# Patient Record
Sex: FEMALE | Race: WHITE | NOT HISPANIC OR LATINO
[De-identification: names, ages, dates, MRNs, and addresses within clinical notes are randomized per-mention and may not be internally consistent; named-entity substitution may affect disease eponyms.]

---

## 2018-08-21 ENCOUNTER — APPOINTMENT (OUTPATIENT)
Dept: CT IMAGING | Facility: CLINIC | Age: 68
End: 2018-08-21
Payer: MEDICARE

## 2018-08-21 ENCOUNTER — OUTPATIENT (OUTPATIENT)
Dept: OUTPATIENT SERVICES | Facility: HOSPITAL | Age: 68
LOS: 1 days | End: 2018-08-21

## 2018-08-21 ENCOUNTER — APPOINTMENT (OUTPATIENT)
Dept: ULTRASOUND IMAGING | Facility: CLINIC | Age: 68
End: 2018-08-21
Payer: MEDICARE

## 2018-08-21 DIAGNOSIS — I51.7 CARDIOMEGALY: ICD-10-CM

## 2018-08-21 DIAGNOSIS — Z86.711 PERSONAL HISTORY OF PULMONARY EMBOLISM: ICD-10-CM

## 2018-08-21 DIAGNOSIS — R60.0 LOCALIZED EDEMA: ICD-10-CM

## 2018-08-21 PROCEDURE — 93970 EXTREMITY STUDY: CPT | Mod: 26

## 2018-08-21 PROCEDURE — 71275 CT ANGIOGRAPHY CHEST: CPT | Mod: 26

## 2018-10-23 ENCOUNTER — APPOINTMENT (OUTPATIENT)
Dept: ULTRASOUND IMAGING | Facility: CLINIC | Age: 68
End: 2018-10-23
Payer: MEDICARE

## 2018-10-23 ENCOUNTER — OUTPATIENT (OUTPATIENT)
Dept: OUTPATIENT SERVICES | Facility: HOSPITAL | Age: 68
LOS: 1 days | End: 2018-10-23

## 2018-10-23 PROCEDURE — 93880 EXTRACRANIAL BILAT STUDY: CPT | Mod: 26

## 2018-10-23 PROCEDURE — 93970 EXTREMITY STUDY: CPT | Mod: 26

## 2018-10-23 PROCEDURE — 76700 US EXAM ABDOM COMPLETE: CPT | Mod: 26

## 2018-11-15 ENCOUNTER — APPOINTMENT (OUTPATIENT)
Dept: VASCULAR SURGERY | Facility: CLINIC | Age: 68
End: 2018-11-15
Payer: MEDICARE

## 2018-11-15 DIAGNOSIS — D68.51 ACTIVATED PROTEIN C RESISTANCE: ICD-10-CM

## 2018-11-15 DIAGNOSIS — I89.0 LYMPHEDEMA, NOT ELSEWHERE CLASSIFIED: ICD-10-CM

## 2018-11-15 DIAGNOSIS — I87.399 CHRONIC VENOUS HYPERTENSION (IDIOPATHIC) WITH OTHER COMPLICATIONS OF UNSPECIFIED LOWER EXTREMITY: ICD-10-CM

## 2018-11-15 DIAGNOSIS — E66.01 MORBID (SEVERE) OBESITY DUE TO EXCESS CALORIES: ICD-10-CM

## 2018-11-15 PROCEDURE — 99213 OFFICE O/P EST LOW 20 MIN: CPT

## 2020-10-13 ENCOUNTER — EMERGENCY (EMERGENCY)
Facility: HOSPITAL | Age: 70
LOS: 1 days | Discharge: ANOTHER HOSPITAL | End: 2020-10-13
Attending: EMERGENCY MEDICINE | Admitting: EMERGENCY MEDICINE
Payer: MEDICARE

## 2020-10-13 VITALS
SYSTOLIC BLOOD PRESSURE: 170 MMHG | TEMPERATURE: 99 F | OXYGEN SATURATION: 92 % | HEART RATE: 107 BPM | WEIGHT: 250 LBS | DIASTOLIC BLOOD PRESSURE: 110 MMHG | RESPIRATION RATE: 18 BRPM

## 2020-10-13 DIAGNOSIS — E83.42 HYPOMAGNESEMIA: ICD-10-CM

## 2020-10-13 DIAGNOSIS — R41.82 ALTERED MENTAL STATUS, UNSPECIFIED: ICD-10-CM

## 2020-10-13 DIAGNOSIS — U07.1 COVID-19: ICD-10-CM

## 2020-10-13 DIAGNOSIS — E87.6 HYPOKALEMIA: ICD-10-CM

## 2020-10-13 LAB
ALBUMIN SERPL ELPH-MCNC: 3.4 G/DL — SIGNIFICANT CHANGE UP (ref 3.4–5)
ALP SERPL-CCNC: 112 U/L — SIGNIFICANT CHANGE UP (ref 40–120)
ALT FLD-CCNC: 20 U/L — SIGNIFICANT CHANGE UP (ref 12–42)
ANION GAP SERPL CALC-SCNC: 10 MMOL/L — SIGNIFICANT CHANGE UP (ref 9–16)
ANION GAP SERPL CALC-SCNC: 10 MMOL/L — SIGNIFICANT CHANGE UP (ref 9–16)
APPEARANCE UR: CLEAR — SIGNIFICANT CHANGE UP
APTT BLD: 31.4 SEC — SIGNIFICANT CHANGE UP (ref 27.5–35.5)
AST SERPL-CCNC: 53 U/L — HIGH (ref 15–37)
BACTERIA # UR AUTO: PRESENT /HPF
BASOPHILS # BLD AUTO: 0.01 K/UL — SIGNIFICANT CHANGE UP (ref 0–0.2)
BASOPHILS NFR BLD AUTO: 0.2 % — SIGNIFICANT CHANGE UP (ref 0–2)
BILIRUB SERPL-MCNC: 0.8 MG/DL — SIGNIFICANT CHANGE UP (ref 0.2–1.2)
BILIRUB UR-MCNC: NEGATIVE — SIGNIFICANT CHANGE UP
BUN SERPL-MCNC: 13 MG/DL — SIGNIFICANT CHANGE UP (ref 7–23)
BUN SERPL-MCNC: 16 MG/DL — SIGNIFICANT CHANGE UP (ref 7–23)
CALCIUM SERPL-MCNC: 10.5 MG/DL — SIGNIFICANT CHANGE UP (ref 8.5–10.5)
CALCIUM SERPL-MCNC: 9.5 MG/DL — SIGNIFICANT CHANGE UP (ref 8.5–10.5)
CHLORIDE SERPL-SCNC: 100 MMOL/L — SIGNIFICANT CHANGE UP (ref 96–108)
CHLORIDE SERPL-SCNC: 104 MMOL/L — SIGNIFICANT CHANGE UP (ref 96–108)
CO2 SERPL-SCNC: 29 MMOL/L — SIGNIFICANT CHANGE UP (ref 22–31)
CO2 SERPL-SCNC: 29 MMOL/L — SIGNIFICANT CHANGE UP (ref 22–31)
COLOR SPEC: YELLOW — SIGNIFICANT CHANGE UP
CREAT SERPL-MCNC: 1.06 MG/DL — SIGNIFICANT CHANGE UP (ref 0.5–1.3)
CREAT SERPL-MCNC: 1.2 MG/DL — SIGNIFICANT CHANGE UP (ref 0.5–1.3)
DIFF PNL FLD: ABNORMAL
EOSINOPHIL # BLD AUTO: 0 K/UL — SIGNIFICANT CHANGE UP (ref 0–0.5)
EOSINOPHIL NFR BLD AUTO: 0 % — SIGNIFICANT CHANGE UP (ref 0–6)
EPI CELLS # UR: SIGNIFICANT CHANGE UP /HPF (ref 0–5)
GLUCOSE SERPL-MCNC: 153 MG/DL — HIGH (ref 70–99)
GLUCOSE SERPL-MCNC: 187 MG/DL — HIGH (ref 70–99)
GLUCOSE UR QL: NEGATIVE — SIGNIFICANT CHANGE UP
HCT VFR BLD CALC: 43.7 % — SIGNIFICANT CHANGE UP (ref 34.5–45)
HGB BLD-MCNC: 13.5 G/DL — SIGNIFICANT CHANGE UP (ref 11.5–15.5)
HYALINE CASTS # UR AUTO: SIGNIFICANT CHANGE UP /LPF (ref 0–2)
IMM GRANULOCYTES NFR BLD AUTO: 0.5 % — SIGNIFICANT CHANGE UP (ref 0–1.5)
INR BLD: 1.16 — SIGNIFICANT CHANGE UP (ref 0.88–1.16)
KETONES UR-MCNC: ABNORMAL MG/DL
LACTATE SERPL-SCNC: 1.7 MMOL/L — SIGNIFICANT CHANGE UP (ref 0.4–2)
LACTATE SERPL-SCNC: 2.9 MMOL/L — HIGH (ref 0.4–2)
LEUKOCYTE ESTERASE UR-ACNC: NEGATIVE — SIGNIFICANT CHANGE UP
LYMPHOCYTES # BLD AUTO: 0.75 K/UL — LOW (ref 1–3.3)
LYMPHOCYTES # BLD AUTO: 13 % — SIGNIFICANT CHANGE UP (ref 13–44)
MAGNESIUM SERPL-MCNC: 1.3 MG/DL — LOW (ref 1.6–2.6)
MAGNESIUM SERPL-MCNC: 2 MG/DL — SIGNIFICANT CHANGE UP (ref 1.6–2.6)
MCHC RBC-ENTMCNC: 29.5 PG — SIGNIFICANT CHANGE UP (ref 27–34)
MCHC RBC-ENTMCNC: 30.9 GM/DL — LOW (ref 32–36)
MCV RBC AUTO: 95.4 FL — SIGNIFICANT CHANGE UP (ref 80–100)
MONOCYTES # BLD AUTO: 0.4 K/UL — SIGNIFICANT CHANGE UP (ref 0–0.9)
MONOCYTES NFR BLD AUTO: 6.9 % — SIGNIFICANT CHANGE UP (ref 2–14)
NEUTROPHILS # BLD AUTO: 4.6 K/UL — SIGNIFICANT CHANGE UP (ref 1.8–7.4)
NEUTROPHILS NFR BLD AUTO: 79.4 % — HIGH (ref 43–77)
NITRITE UR-MCNC: NEGATIVE — SIGNIFICANT CHANGE UP
NRBC # BLD: 0 /100 WBCS — SIGNIFICANT CHANGE UP (ref 0–0)
PCO2 BLDV: 51 MMHG — SIGNIFICANT CHANGE UP (ref 41–51)
PH BLDV: 7.4 — SIGNIFICANT CHANGE UP (ref 7.32–7.43)
PH UR: 7 — SIGNIFICANT CHANGE UP (ref 5–8)
PLATELET # BLD AUTO: 130 K/UL — LOW (ref 150–400)
PO2 BLDV: 24 MMHG — LOW (ref 35–40)
POTASSIUM SERPL-MCNC: 3.2 MMOL/L — LOW (ref 3.5–5.3)
POTASSIUM SERPL-MCNC: 3.6 MMOL/L — SIGNIFICANT CHANGE UP (ref 3.5–5.3)
POTASSIUM SERPL-SCNC: 3.2 MMOL/L — LOW (ref 3.5–5.3)
POTASSIUM SERPL-SCNC: 3.6 MMOL/L — SIGNIFICANT CHANGE UP (ref 3.5–5.3)
PROT SERPL-MCNC: 7.6 G/DL — SIGNIFICANT CHANGE UP (ref 6.4–8.2)
PROT UR-MCNC: ABNORMAL MG/DL
PROTHROM AB SERPL-ACNC: 13.6 SEC — SIGNIFICANT CHANGE UP (ref 10.6–13.6)
RBC # BLD: 4.58 M/UL — SIGNIFICANT CHANGE UP (ref 3.8–5.2)
RBC # FLD: 12.6 % — SIGNIFICANT CHANGE UP (ref 10.3–14.5)
RBC CASTS # UR COMP ASSIST: < 5 /HPF — SIGNIFICANT CHANGE UP
SAO2 % BLDV: 39 % — SIGNIFICANT CHANGE UP
SARS-COV-2 RNA SPEC QL NAA+PROBE: DETECTED
SODIUM SERPL-SCNC: 139 MMOL/L — SIGNIFICANT CHANGE UP (ref 132–145)
SODIUM SERPL-SCNC: 143 MMOL/L — SIGNIFICANT CHANGE UP (ref 132–145)
SP GR SPEC: 1.01 — SIGNIFICANT CHANGE UP (ref 1–1.03)
TROPONIN I SERPL-MCNC: 0.04 NG/ML — SIGNIFICANT CHANGE UP (ref 0.02–0.06)
UROBILINOGEN FLD QL: 0.2 E.U./DL — SIGNIFICANT CHANGE UP
WBC # BLD: 5.79 K/UL — SIGNIFICANT CHANGE UP (ref 3.8–10.5)
WBC # FLD AUTO: 5.79 K/UL — SIGNIFICANT CHANGE UP (ref 3.8–10.5)
WBC UR QL: < 5 /HPF — SIGNIFICANT CHANGE UP

## 2020-10-13 PROCEDURE — 93010 ELECTROCARDIOGRAM REPORT: CPT

## 2020-10-13 PROCEDURE — 70450 CT HEAD/BRAIN W/O DYE: CPT | Mod: 26

## 2020-10-13 PROCEDURE — 71045 X-RAY EXAM CHEST 1 VIEW: CPT | Mod: 26

## 2020-10-13 PROCEDURE — 99285 EMERGENCY DEPT VISIT HI MDM: CPT | Mod: CS,25

## 2020-10-13 RX ORDER — POTASSIUM CHLORIDE 20 MEQ
40 PACKET (EA) ORAL ONCE
Refills: 0 | Status: COMPLETED | OUTPATIENT
Start: 2020-10-13 | End: 2020-10-13

## 2020-10-13 RX ORDER — MAGNESIUM SULFATE 500 MG/ML
1 VIAL (ML) INJECTION ONCE
Refills: 0 | Status: DISCONTINUED | OUTPATIENT
Start: 2020-10-13 | End: 2020-10-13

## 2020-10-13 RX ORDER — SODIUM CHLORIDE 9 MG/ML
3000 INJECTION INTRAMUSCULAR; INTRAVENOUS; SUBCUTANEOUS ONCE
Refills: 0 | Status: COMPLETED | OUTPATIENT
Start: 2020-10-13 | End: 2020-10-13

## 2020-10-13 RX ORDER — MAGNESIUM SULFATE 500 MG/ML
2 VIAL (ML) INJECTION ONCE
Refills: 0 | Status: COMPLETED | OUTPATIENT
Start: 2020-10-13 | End: 2020-10-13

## 2020-10-13 RX ORDER — CEFTRIAXONE 500 MG/1
1000 INJECTION, POWDER, FOR SOLUTION INTRAMUSCULAR; INTRAVENOUS ONCE
Refills: 0 | Status: COMPLETED | OUTPATIENT
Start: 2020-10-13 | End: 2020-10-13

## 2020-10-13 RX ORDER — KETOROLAC TROMETHAMINE 30 MG/ML
30 SYRINGE (ML) INJECTION ONCE
Refills: 0 | Status: DISCONTINUED | OUTPATIENT
Start: 2020-10-13 | End: 2020-10-13

## 2020-10-13 RX ORDER — ACETAMINOPHEN 500 MG
650 TABLET ORAL ONCE
Refills: 0 | Status: COMPLETED | OUTPATIENT
Start: 2020-10-13 | End: 2020-10-13

## 2020-10-13 RX ADMIN — CEFTRIAXONE 100 MILLIGRAM(S): 500 INJECTION, POWDER, FOR SOLUTION INTRAMUSCULAR; INTRAVENOUS at 14:50

## 2020-10-13 RX ADMIN — SODIUM CHLORIDE 3000 MILLILITER(S): 9 INJECTION INTRAMUSCULAR; INTRAVENOUS; SUBCUTANEOUS at 14:50

## 2020-10-13 RX ADMIN — Medication 30 MILLIGRAM(S): at 23:29

## 2020-10-13 RX ADMIN — Medication 30 MILLIGRAM(S): at 15:32

## 2020-10-13 RX ADMIN — Medication 40 MILLIEQUIVALENT(S): at 16:52

## 2020-10-13 RX ADMIN — Medication 50 GRAM(S): at 16:52

## 2020-10-13 RX ADMIN — Medication 40 MILLIEQUIVALENT(S): at 23:29

## 2020-10-13 NOTE — ED ADULT TRIAGE NOTE - CHIEF COMPLAINT QUOTE
Pt brought in by EMS for AMS. As per family members pt mental status has declining over the past few days. Pt lives alone and they do not believe that she has been taking her medications. Upon EMS arrival pt was unable to ambulate and incontinent. HX of ectopic fibrillation, diabetes and Factor 5.

## 2020-10-13 NOTE — ED PROVIDER NOTE - DIAGNOSTIC INTERPRETATION
Chest x-ray interpreted by BENIGNO PA: Antonette Johnston  Findings: heart size normal, no infiltrates, lungs fully expanded, soft tissues appear normal.

## 2020-10-13 NOTE — ED PROVIDER NOTE - OBJECTIVE STATEMENT
71 yo F w/ PMHx of CHF, gout, DM, cardiomyopathy, atrial fibrillation, Factor V Leiden? BIBEMS for evaluation of Altered Mental Status. Per family, pt has been confused for the past last month and has been bedbound at home. Today the pt's family found her disheveled and covered in urine; pt is c/o R shoulder pain. Pt w/ chronic R rotator cuff injury, for which she receives physical therapy; PT notes fever 103F earlier today during session - pt is afebrile on arrival. No fever currently, chills, chest pain, cough, SOB, abdominal pain, fall or trauma, urinary complaints.

## 2020-10-13 NOTE — ED PROVIDER NOTE - SKIN, MLM
generalized lymphedema, 2x2cm superficial wound to R anterior lower leg with mild erythema, not warm to touch, no discharge, no bleeding, compartments soft

## 2020-10-13 NOTE — ED PROVIDER NOTE - PROGRESS NOTE DETAILS
PMD: Dr Benita Louis ([phone] & [phone]); spoke with PMD who confirms pt's PMHx, notes she is currently getting worked up for Factor V Leiden. PMD notes pt takes carvedilol, prodaxa, digoxin, allopurinol, triceba, and lasix. Pt is allergic to ampicillin. Pt was diagnosed with UTI in August 2020, which was not treated because the pt was not symptomatic. called [facility] transfer center through [facility] regarding admission. States will get hospitalist and call back. called transfer center again. will get hospitalist and call back. case discussed with Dr. Hernández who accepts pt. requests repeat lytes and trop. updated family and healthcare proxy: Maria D Miranda [phone].

## 2020-10-13 NOTE — ED PROVIDER NOTE - CARE PLAN
Principal Discharge DX:	COVID-19  Secondary Diagnosis:	Hypokalemia  Secondary Diagnosis:	Hypomagnesemia  Secondary Diagnosis:	Debility

## 2020-10-13 NOTE — ED ADULT NURSE NOTE - OBJECTIVE STATEMENT
Pt BIBA from home with family c/o AMS. As per family members pt has been declining over the past few days, has become more lethargic with difficulty ambulating and episodes of incontinence. Family also believes she hasn't been taking her daily medications. Pt is A&Ox3. When asked why pt is here, pt stated "I think I have an infection". Pt does not answer any other questions. Denies any pain.

## 2020-10-13 NOTE — ED PROVIDER NOTE - CHPI ED SYMPTOMS NEG
no fever/no chills/no decreased eating/drinking/no vomiting/no numbness/no weakness/no nausea/no tingling

## 2020-10-13 NOTE — ED PROVIDER NOTE - CLINICAL SUMMARY MEDICAL DECISION MAKING FREE TEXT BOX
pt presents today with family with concern for AMS with slight confusion, weakness, debility, urinary incontinence, fever, and chills. labs significant for hypomagnesemia, hypokalemia, both of which were repleted. also with elevated lactate which was treated with 1.5L NS but given h/o lymphedema, CHF, and concern for COVID did not give weight based fluids. lactate improved. also noted to be covid positive. pt and family do not feel that she is safe to be at home by herself as she is usually ambulating but given the weakness, she is hardly able to walk. pt followed by Dr. Benita Louis [phone] and several other doctors at [facility] and requests transfer. pt presents today with family with concern for AMS with slight confusion, weakness, debility, urinary incontinence, fever, and chills. labs significant for hypomagnesemia, hypokalemia, both of which were repleted. also with elevated lactate which was treated with 1.5L NS but given h/o lymphedema, CHF, and concern for COVID did not give weight based fluids. lactate improved. also noted to be covid positive. pt and family do not feel that she is safe to be at home by herself as she is usually ambulating but given the weakness, she is hardly able to walk. pt followed by Dr. Benita Louis [phone] and several other doctors at [facility] and requests transfer. Updated [facility] - repeat potassium and magnesium within normal. Patient currently waiting for bed on regular medical floor.

## 2020-10-14 VITALS
OXYGEN SATURATION: 100 % | DIASTOLIC BLOOD PRESSURE: 88 MMHG | RESPIRATION RATE: 18 BRPM | HEART RATE: 60 BPM | SYSTOLIC BLOOD PRESSURE: 141 MMHG | TEMPERATURE: 97 F

## 2020-10-14 LAB — B-OH-BUTYR SERPL-SCNC: 0.2 MMOL/L — SIGNIFICANT CHANGE UP

## 2020-10-15 ENCOUNTER — TRANSCRIPTION ENCOUNTER (OUTPATIENT)
Age: 70
End: 2020-10-15

## 2020-10-15 LAB
CULTURE RESULTS: NO GROWTH — SIGNIFICANT CHANGE UP
SPECIMEN SOURCE: SIGNIFICANT CHANGE UP

## 2020-10-19 LAB
CULTURE RESULTS: SIGNIFICANT CHANGE UP
CULTURE RESULTS: SIGNIFICANT CHANGE UP
SPECIMEN SOURCE: SIGNIFICANT CHANGE UP
SPECIMEN SOURCE: SIGNIFICANT CHANGE UP

## 2023-01-28 ENCOUNTER — EMERGENCY (EMERGENCY)
Facility: HOSPITAL | Age: 73
LOS: 1 days | Discharge: HOME | End: 2023-01-28
Attending: EMERGENCY MEDICINE | Admitting: EMERGENCY MEDICINE
Payer: MEDICARE

## 2023-01-28 VITALS
OXYGEN SATURATION: 97 % | SYSTOLIC BLOOD PRESSURE: 136 MMHG | HEART RATE: 58 BPM | TEMPERATURE: 97 F | DIASTOLIC BLOOD PRESSURE: 94 MMHG | RESPIRATION RATE: 18 BRPM

## 2023-01-28 VITALS
DIASTOLIC BLOOD PRESSURE: 80 MMHG | HEART RATE: 76 BPM | TEMPERATURE: 98 F | WEIGHT: 293 LBS | OXYGEN SATURATION: 99 % | SYSTOLIC BLOOD PRESSURE: 129 MMHG | RESPIRATION RATE: 18 BRPM

## 2023-01-28 LAB
ALBUMIN SERPL ELPH-MCNC: 3.3 G/DL — LOW (ref 3.4–5)
ALP SERPL-CCNC: 102 U/L — SIGNIFICANT CHANGE UP (ref 40–120)
ALT FLD-CCNC: 24 U/L — SIGNIFICANT CHANGE UP (ref 12–42)
ANION GAP SERPL CALC-SCNC: 5 MMOL/L — LOW (ref 9–16)
APPEARANCE UR: CLEAR — SIGNIFICANT CHANGE UP
AST SERPL-CCNC: 43 U/L — HIGH (ref 15–37)
BACTERIA # UR AUTO: PRESENT /HPF
BASOPHILS # BLD AUTO: 0.08 K/UL — SIGNIFICANT CHANGE UP (ref 0–0.2)
BASOPHILS NFR BLD AUTO: 0.8 % — SIGNIFICANT CHANGE UP (ref 0–2)
BILIRUB SERPL-MCNC: 0.5 MG/DL — SIGNIFICANT CHANGE UP (ref 0.2–1.2)
BILIRUB UR-MCNC: NEGATIVE — SIGNIFICANT CHANGE UP
BUN SERPL-MCNC: 9 MG/DL — SIGNIFICANT CHANGE UP (ref 7–23)
CALCIUM SERPL-MCNC: 10.8 MG/DL — HIGH (ref 8.5–10.5)
CHLORIDE SERPL-SCNC: 110 MMOL/L — HIGH (ref 96–108)
CO2 SERPL-SCNC: 30 MMOL/L — SIGNIFICANT CHANGE UP (ref 22–31)
COLOR SPEC: YELLOW — SIGNIFICANT CHANGE UP
CREAT SERPL-MCNC: 1 MG/DL — SIGNIFICANT CHANGE UP (ref 0.5–1.3)
DIFF PNL FLD: ABNORMAL
EGFR: 59 ML/MIN/1.73M2 — LOW
EOSINOPHIL # BLD AUTO: 0.24 K/UL — SIGNIFICANT CHANGE UP (ref 0–0.5)
EOSINOPHIL NFR BLD AUTO: 2.4 % — SIGNIFICANT CHANGE UP (ref 0–6)
GLUCOSE BLDC GLUCOMTR-MCNC: 108 MG/DL — HIGH (ref 70–99)
GLUCOSE SERPL-MCNC: 130 MG/DL — HIGH (ref 70–99)
GLUCOSE UR QL: NEGATIVE — SIGNIFICANT CHANGE UP
HCT VFR BLD CALC: 46.3 % — HIGH (ref 34.5–45)
HGB BLD-MCNC: 14.7 G/DL — SIGNIFICANT CHANGE UP (ref 11.5–15.5)
IMM GRANULOCYTES NFR BLD AUTO: 0.2 % — SIGNIFICANT CHANGE UP (ref 0–0.9)
KETONES UR-MCNC: NEGATIVE — SIGNIFICANT CHANGE UP
LEUKOCYTE ESTERASE UR-ACNC: ABNORMAL
LYMPHOCYTES # BLD AUTO: 1.75 K/UL — SIGNIFICANT CHANGE UP (ref 1–3.3)
LYMPHOCYTES # BLD AUTO: 17.7 % — SIGNIFICANT CHANGE UP (ref 13–44)
MCHC RBC-ENTMCNC: 31.7 GM/DL — LOW (ref 32–36)
MCHC RBC-ENTMCNC: 31.7 PG — SIGNIFICANT CHANGE UP (ref 27–34)
MCV RBC AUTO: 99.8 FL — SIGNIFICANT CHANGE UP (ref 80–100)
MONOCYTES # BLD AUTO: 0.56 K/UL — SIGNIFICANT CHANGE UP (ref 0–0.9)
MONOCYTES NFR BLD AUTO: 5.7 % — SIGNIFICANT CHANGE UP (ref 2–14)
NEUTROPHILS # BLD AUTO: 7.25 K/UL — SIGNIFICANT CHANGE UP (ref 1.8–7.4)
NEUTROPHILS NFR BLD AUTO: 73.2 % — SIGNIFICANT CHANGE UP (ref 43–77)
NITRITE UR-MCNC: NEGATIVE — SIGNIFICANT CHANGE UP
NRBC # BLD: 0 /100 WBCS — SIGNIFICANT CHANGE UP (ref 0–0)
PH UR: 6 — SIGNIFICANT CHANGE UP (ref 5–8)
PLATELET # BLD AUTO: 190 K/UL — SIGNIFICANT CHANGE UP (ref 150–400)
POTASSIUM SERPL-MCNC: 3.8 MMOL/L — SIGNIFICANT CHANGE UP (ref 3.5–5.3)
POTASSIUM SERPL-SCNC: 3.8 MMOL/L — SIGNIFICANT CHANGE UP (ref 3.5–5.3)
PROT SERPL-MCNC: 6.9 G/DL — SIGNIFICANT CHANGE UP (ref 6.4–8.2)
PROT UR-MCNC: 30 MG/DL
RBC # BLD: 4.64 M/UL — SIGNIFICANT CHANGE UP (ref 3.8–5.2)
RBC # FLD: 13.1 % — SIGNIFICANT CHANGE UP (ref 10.3–14.5)
RBC CASTS # UR COMP ASSIST: < 5 /HPF — SIGNIFICANT CHANGE UP
SARS-COV-2 RNA SPEC QL NAA+PROBE: SIGNIFICANT CHANGE UP
SODIUM SERPL-SCNC: 145 MMOL/L — SIGNIFICANT CHANGE UP (ref 132–145)
SP GR SPEC: 1.02 — SIGNIFICANT CHANGE UP (ref 1–1.03)
TROPONIN I, HIGH SENSITIVITY RESULT: 15.1 NG/L — SIGNIFICANT CHANGE UP
TSH SERPL-MCNC: 2.92 UIU/ML — SIGNIFICANT CHANGE UP (ref 0.36–3.74)
UROBILINOGEN FLD QL: 0.2 E.U./DL — SIGNIFICANT CHANGE UP
WBC # BLD: 9.9 K/UL — SIGNIFICANT CHANGE UP (ref 3.8–10.5)
WBC # FLD AUTO: 9.9 K/UL — SIGNIFICANT CHANGE UP (ref 3.8–10.5)
WBC UR QL: ABNORMAL /HPF

## 2023-01-28 PROCEDURE — 70450 CT HEAD/BRAIN W/O DYE: CPT | Mod: 26,MH

## 2023-01-28 PROCEDURE — 99285 EMERGENCY DEPT VISIT HI MDM: CPT

## 2023-01-28 PROCEDURE — 71045 X-RAY EXAM CHEST 1 VIEW: CPT | Mod: 26

## 2023-01-28 RX ORDER — CEFTRIAXONE 500 MG/1
1000 INJECTION, POWDER, FOR SOLUTION INTRAMUSCULAR; INTRAVENOUS ONCE
Refills: 0 | Status: COMPLETED | OUTPATIENT
Start: 2023-01-28 | End: 2023-01-28

## 2023-01-28 RX ORDER — SODIUM CHLORIDE 9 MG/ML
1000 INJECTION INTRAMUSCULAR; INTRAVENOUS; SUBCUTANEOUS ONCE
Refills: 0 | Status: COMPLETED | OUTPATIENT
Start: 2023-01-28 | End: 2023-01-28

## 2023-01-28 RX ORDER — CEFUROXIME AXETIL 250 MG
1 TABLET ORAL
Qty: 10 | Refills: 0
Start: 2023-01-28 | End: 2023-02-01

## 2023-01-28 RX ADMIN — CEFTRIAXONE 100 MILLIGRAM(S): 500 INJECTION, POWDER, FOR SOLUTION INTRAMUSCULAR; INTRAVENOUS at 16:40

## 2023-01-28 RX ADMIN — SODIUM CHLORIDE 250 MILLILITER(S): 9 INJECTION INTRAMUSCULAR; INTRAVENOUS; SUBCUTANEOUS at 15:44

## 2023-01-28 NOTE — ED PROVIDER NOTE - EYES NEGATIVE STATEMENT, MLM
no discharge, no irritation, no pain, no redness, and no visual changes. Rhomboid Transposition Flap Text: The defect edges were debeveled with a #15 scalpel blade.  Given the location of the defect and the proximity to free margins a rhomboid transposition flap was deemed most appropriate.  Using a sterile surgical marker, an appropriate rhomboid flap was drawn incorporating the defect.    The area thus outlined was incised deep to adipose tissue with a #15 scalpel blade.  The skin margins were undermined to an appropriate distance in all directions utilizing iris scissors.

## 2023-01-28 NOTE — ED PROVIDER NOTE - PATIENT PORTAL LINK FT
You can access the FollowMyHealth Patient Portal offered by [facility] by registering at the following website: [email or web address]. By joining [facility]’s FollowMyHealth portal, you will also be able to view your health information using other applications (apps) compatible with our system.

## 2023-01-28 NOTE — ED PROVIDER NOTE - NSFOLLOWUPINSTRUCTIONS_ED_ALL_ED_FT
take antibiotics a s directed     stay well hydrated     follow up with Dr. Cummings next week     return to ER for fever pain chest pain shortness of breath or any other concern

## 2023-01-28 NOTE — ED PROVIDER NOTE - NS ED SCRIBE STATEMENT

## 2023-01-28 NOTE — ED PROVIDER NOTE - CHPI ED SYMPTOMS NEG
no chest pain, no neck pain, no jaw pain, no arm pain, no back pain, no shortness of breath, no abdominal pain, no increase in her generalized weakness, no focal weakness, no sensory changes, no new visual or speech changes, no fever, no vomiting, no diarrhea, no rash

## 2023-01-28 NOTE — ED PROVIDER NOTE - MUSCULOSKELETAL, MLM
+Motor strength in lower extremities 4/5 symmetrically that is normal. 2+ radial pulses 2+ pedal pulses.

## 2023-01-28 NOTE — ED ADULT TRIAGE NOTE - CHIEF COMPLAINT QUOTE
was told by her doctor to have her come in and be evaluated for UTI- as per HHA, pt has had periods of confusion and possible urinary discharge. h/o afib and factor 5- pt is awake and alert

## 2023-01-28 NOTE — ED PROVIDER NOTE - CONSTITUTIONAL, MLM
normal... Awake, alert, oriented to person, place, time/situation and appropriate, conversive, and pleasant.

## 2023-01-28 NOTE — ED PROVIDER NOTE - CLINICAL SUMMARY MEDICAL DECISION MAKING FREE TEXT BOX
ED evaluation and management discussed with the patient and family (if available) in detail.  Close PMD follow up encouraged.  Strict ED return instructions discussed in detail and patient given the opportunity to ask any questions about their discharge diagnosis and instructions. Patient verbalized understanding.    dr paniagua made aware of labs results and treatment plan

## 2023-01-28 NOTE — ED PROVIDER NOTE - OBJECTIVE STATEMENT
Triage VS: HR: 76, BP: 129/80, Resp.: 18, Temp.: 97.8 F, O2: 99%  Triage Note: was told by her doctor to have her come in and be evaluated for UTI- as per HHA, pt has had periods of confusion and possible urinary discharge. h/o afib and factor 5- pt is awake and alert Triage VS: HR: 76, BP: 129/80, Resp.: 18, Temp.: 97.8 F, O2: 99%  Triage Note: was told by her doctor to have her come in and be evaluated for UTI- as per HHA, pt has had periods of confusion and possible urinary discharge. h/o afib and factor 5- pt is awake and alert  Myself, the home health aide, patient's friend for 10 years, and the patient were present during the examination.     73 y/o F with PMHx of A-fib, diabetes, generalized weakness and inability to ambulate that is long standing, patient is assisted by HHA who changes her and moves her around the apartment, presents today after being told to come to the hospital by her physician Dr. Louis Cummings who did a home visit on Wednesday and was concerned patient might have a UTI. He sent her here for UTI evaluation. History obtained from doctor, patient, home health aide, and patient's friend. Patient denies any chest, neck, jaw, arm, or back pain. No shortness of breath, no abdominal pain, no increase in her generalized weakness, no focal weakness, no sensory changes, no new visual or speech changes, no fever, no vomiting, no diarrhea, no rash. Physician Dr. Cummings said that the patient appeared more drowsy to him than her baseline which was also one reason he sent her to the ED. HHA and friend say yes she appears intermittently drowsy over the last several weeks to months but it is non progressive and intermittent only. Here in the ER they both say she appears like her normal self. Dr. Louis Cummings took blood work on 1/25 and the WBC was minimally elevated 11.3, glucose was 46, CMP that was otherwise normal, hemoglobin A1c of 7.7, Vitamin B12 was normal.

## 2023-01-30 DIAGNOSIS — E11.9 TYPE 2 DIABETES MELLITUS WITHOUT COMPLICATIONS: ICD-10-CM

## 2023-01-30 DIAGNOSIS — N39.0 URINARY TRACT INFECTION, SITE NOT SPECIFIED: ICD-10-CM

## 2023-01-30 DIAGNOSIS — I48.91 UNSPECIFIED ATRIAL FIBRILLATION: ICD-10-CM

## 2023-01-30 DIAGNOSIS — Z20.822 CONTACT WITH AND (SUSPECTED) EXPOSURE TO COVID-19: ICD-10-CM

## 2023-01-30 DIAGNOSIS — R41.0 DISORIENTATION, UNSPECIFIED: ICD-10-CM

## 2023-01-30 DIAGNOSIS — Z88.6 ALLERGY STATUS TO ANALGESIC AGENT: ICD-10-CM

## 2023-01-31 LAB
-  AMIKACIN: SIGNIFICANT CHANGE UP
-  AMOXICILLIN/CLAVULANIC ACID: SIGNIFICANT CHANGE UP
-  AMPICILLIN/SULBACTAM: SIGNIFICANT CHANGE UP
-  AMPICILLIN: SIGNIFICANT CHANGE UP
-  AZTREONAM: SIGNIFICANT CHANGE UP
-  CEFAZOLIN: SIGNIFICANT CHANGE UP
-  CEFEPIME: SIGNIFICANT CHANGE UP
-  CEFTRIAXONE: SIGNIFICANT CHANGE UP
-  CEFUROXIME: SIGNIFICANT CHANGE UP
-  CIPROFLOXACIN: SIGNIFICANT CHANGE UP
-  ERTAPENEM: SIGNIFICANT CHANGE UP
-  GENTAMICIN: SIGNIFICANT CHANGE UP
-  IMIPENEM: SIGNIFICANT CHANGE UP
-  LEVOFLOXACIN: SIGNIFICANT CHANGE UP
-  MEROPENEM: SIGNIFICANT CHANGE UP
-  NITROFURANTOIN: SIGNIFICANT CHANGE UP
-  PIPERACILLIN/TAZOBACTAM: SIGNIFICANT CHANGE UP
-  TOBRAMYCIN: SIGNIFICANT CHANGE UP
-  TRIMETHOPRIM/SULFAMETHOXAZOLE: SIGNIFICANT CHANGE UP
CULTURE RESULTS: SIGNIFICANT CHANGE UP
METHOD TYPE: SIGNIFICANT CHANGE UP
ORGANISM # SPEC MICROSCOPIC CNT: SIGNIFICANT CHANGE UP
ORGANISM # SPEC MICROSCOPIC CNT: SIGNIFICANT CHANGE UP
SPECIMEN SOURCE: SIGNIFICANT CHANGE UP

## 2023-02-02 NOTE — ED POST DISCHARGE NOTE - DETAILS
vm left on 2/2/2023 Spoke to pt and HHA.  No fever or flank pain.  Pt wears diapers at home.  Will switch abx to PO bactrim.  Pt and HHA aware of change, will go  abx today.  Pt instructed to follow up for repeat UCx with PMD in 1wk. 2/3/23

## 2024-04-30 ENCOUNTER — INPATIENT (INPATIENT)
Facility: HOSPITAL | Age: 74
LOS: 3 days | Discharge: NURSING, REHAB OR LONG-TERM CARE | End: 2024-05-04
Attending: INTERNAL MEDICINE | Admitting: STUDENT IN AN ORGANIZED HEALTH CARE EDUCATION/TRAINING PROGRAM
Payer: MEDICARE

## 2024-04-30 VITALS
OXYGEN SATURATION: 95 % | DIASTOLIC BLOOD PRESSURE: 96 MMHG | HEART RATE: 89 BPM | SYSTOLIC BLOOD PRESSURE: 128 MMHG | WEIGHT: 279.99 LBS | RESPIRATION RATE: 18 BRPM | TEMPERATURE: 98 F

## 2024-04-30 PROBLEM — E11.9 TYPE 2 DIABETES MELLITUS WITHOUT COMPLICATIONS: Chronic | Status: ACTIVE | Noted: 2023-01-28

## 2024-04-30 PROBLEM — I48.91 UNSPECIFIED ATRIAL FIBRILLATION: Chronic | Status: ACTIVE | Noted: 2023-01-28

## 2024-04-30 PROBLEM — R53.1 WEAKNESS: Chronic | Status: ACTIVE | Noted: 2023-01-28

## 2024-04-30 LAB
ALBUMIN SERPL ELPH-MCNC: 3.3 G/DL — LOW (ref 3.4–5)
ALP SERPL-CCNC: 137 U/L — HIGH (ref 40–120)
ALT FLD-CCNC: 13 U/L — SIGNIFICANT CHANGE UP (ref 12–42)
ANION GAP SERPL CALC-SCNC: 12 MMOL/L — SIGNIFICANT CHANGE UP (ref 9–16)
APPEARANCE UR: ABNORMAL
APTT BLD: 35 SEC — SIGNIFICANT CHANGE UP (ref 24.5–35.6)
AST SERPL-CCNC: 22 U/L — SIGNIFICANT CHANGE UP (ref 15–37)
BACTERIA # UR AUTO: ABNORMAL /HPF
BASOPHILS # BLD AUTO: 0.06 K/UL — SIGNIFICANT CHANGE UP (ref 0–0.2)
BASOPHILS NFR BLD AUTO: 0.4 % — SIGNIFICANT CHANGE UP (ref 0–2)
BILIRUB SERPL-MCNC: 0.7 MG/DL — SIGNIFICANT CHANGE UP (ref 0.2–1.2)
BILIRUB UR-MCNC: NEGATIVE — SIGNIFICANT CHANGE UP
BUN SERPL-MCNC: 11 MG/DL — SIGNIFICANT CHANGE UP (ref 7–23)
CALCIUM SERPL-MCNC: 11.8 MG/DL — HIGH (ref 8.5–10.5)
CHLORIDE SERPL-SCNC: 105 MMOL/L — SIGNIFICANT CHANGE UP (ref 96–108)
CO2 SERPL-SCNC: 24 MMOL/L — SIGNIFICANT CHANGE UP (ref 22–31)
COLOR SPEC: YELLOW — SIGNIFICANT CHANGE UP
COMMENT - URINE: SIGNIFICANT CHANGE UP
CREAT SERPL-MCNC: 1.38 MG/DL — HIGH (ref 0.5–1.3)
DIFF PNL FLD: ABNORMAL
DIGOXIN SERPL-MCNC: <0.2 NG/ML — LOW (ref 0.8–2)
EGFR: 40 ML/MIN/1.73M2 — LOW
EOSINOPHIL # BLD AUTO: 0 K/UL — SIGNIFICANT CHANGE UP (ref 0–0.5)
EOSINOPHIL NFR BLD AUTO: 0 % — SIGNIFICANT CHANGE UP (ref 0–6)
EPI CELLS # UR: SIGNIFICANT CHANGE UP
FLUAV AG NPH QL: SIGNIFICANT CHANGE UP
FLUBV AG NPH QL: SIGNIFICANT CHANGE UP
GLUCOSE SERPL-MCNC: 279 MG/DL — HIGH (ref 70–99)
GLUCOSE UR QL: NEGATIVE MG/DL — SIGNIFICANT CHANGE UP
GRAN CASTS # UR COMP ASSIST: SIGNIFICANT CHANGE UP
HCT VFR BLD CALC: 46.4 % — HIGH (ref 34.5–45)
HGB BLD-MCNC: 15.2 G/DL — SIGNIFICANT CHANGE UP (ref 11.5–15.5)
HYALINE CASTS # UR AUTO: SIGNIFICANT CHANGE UP
IMM GRANULOCYTES NFR BLD AUTO: 0.4 % — SIGNIFICANT CHANGE UP (ref 0–0.9)
INR BLD: 1.32 — HIGH (ref 0.85–1.18)
KETONES UR-MCNC: NEGATIVE MG/DL — SIGNIFICANT CHANGE UP
LACTATE BLDV-MCNC: 3.6 MMOL/L — HIGH (ref 0.5–2)
LACTATE BLDV-MCNC: 3.9 MMOL/L — HIGH (ref 0.5–2)
LEUKOCYTE ESTERASE UR-ACNC: ABNORMAL
LYMPHOCYTES # BLD AUTO: 0.81 K/UL — LOW (ref 1–3.3)
LYMPHOCYTES # BLD AUTO: 4.9 % — LOW (ref 13–44)
MCHC RBC-ENTMCNC: 32.3 PG — SIGNIFICANT CHANGE UP (ref 27–34)
MCHC RBC-ENTMCNC: 32.8 GM/DL — SIGNIFICANT CHANGE UP (ref 32–36)
MCV RBC AUTO: 98.5 FL — SIGNIFICANT CHANGE UP (ref 80–100)
MONOCYTES # BLD AUTO: 0.37 K/UL — SIGNIFICANT CHANGE UP (ref 0–0.9)
MONOCYTES NFR BLD AUTO: 2.3 % — SIGNIFICANT CHANGE UP (ref 2–14)
NEUTROPHILS # BLD AUTO: 15.12 K/UL — HIGH (ref 1.8–7.4)
NEUTROPHILS NFR BLD AUTO: 92 % — HIGH (ref 43–77)
NITRITE UR-MCNC: NEGATIVE — SIGNIFICANT CHANGE UP
NRBC # BLD: 0 /100 WBCS — SIGNIFICANT CHANGE UP (ref 0–0)
PH UR: 7 — SIGNIFICANT CHANGE UP (ref 5–8)
PLATELET # BLD AUTO: 196 K/UL — SIGNIFICANT CHANGE UP (ref 150–400)
POTASSIUM SERPL-MCNC: 3.7 MMOL/L — SIGNIFICANT CHANGE UP (ref 3.5–5.3)
POTASSIUM SERPL-SCNC: 3.7 MMOL/L — SIGNIFICANT CHANGE UP (ref 3.5–5.3)
PROT SERPL-MCNC: 7.5 G/DL — SIGNIFICANT CHANGE UP (ref 6.4–8.2)
PROT UR-MCNC: 30 MG/DL
PROTHROM AB SERPL-ACNC: 14.4 SEC — HIGH (ref 9.5–13)
RBC # BLD: 4.71 M/UL — SIGNIFICANT CHANGE UP (ref 3.8–5.2)
RBC # FLD: 13.2 % — SIGNIFICANT CHANGE UP (ref 10.3–14.5)
RBC CASTS # UR COMP ASSIST: 5 /HPF — HIGH (ref 0–4)
RSV RNA NPH QL NAA+NON-PROBE: SIGNIFICANT CHANGE UP
SARS-COV-2 RNA SPEC QL NAA+PROBE: SIGNIFICANT CHANGE UP
SODIUM SERPL-SCNC: 141 MMOL/L — SIGNIFICANT CHANGE UP (ref 132–145)
SP GR SPEC: 1.01 — SIGNIFICANT CHANGE UP (ref 1–1.03)
TROPONIN I, HIGH SENSITIVITY RESULT: 8.2 NG/L — SIGNIFICANT CHANGE UP
UROBILINOGEN FLD QL: 0.2 MG/DL — SIGNIFICANT CHANGE UP (ref 0.2–1)
WBC # BLD: 16.43 K/UL — HIGH (ref 3.8–10.5)
WBC # FLD AUTO: 16.43 K/UL — HIGH (ref 3.8–10.5)
WBC UR QL: 100 /HPF — HIGH (ref 0–5)

## 2024-04-30 PROCEDURE — 71045 X-RAY EXAM CHEST 1 VIEW: CPT | Mod: 26

## 2024-04-30 PROCEDURE — 74177 CT ABD & PELVIS W/CONTRAST: CPT | Mod: 26,MC

## 2024-04-30 PROCEDURE — 76705 ECHO EXAM OF ABDOMEN: CPT | Mod: 26

## 2024-04-30 PROCEDURE — 99285 EMERGENCY DEPT VISIT HI MDM: CPT

## 2024-04-30 RX ORDER — FAMOTIDINE 10 MG/ML
20 INJECTION INTRAVENOUS ONCE
Refills: 0 | Status: COMPLETED | OUTPATIENT
Start: 2024-04-30 | End: 2024-04-30

## 2024-04-30 RX ORDER — ONDANSETRON 8 MG/1
4 TABLET, FILM COATED ORAL ONCE
Refills: 0 | Status: COMPLETED | OUTPATIENT
Start: 2024-04-30 | End: 2024-04-30

## 2024-04-30 RX ORDER — SODIUM CHLORIDE 9 MG/ML
1000 INJECTION INTRAMUSCULAR; INTRAVENOUS; SUBCUTANEOUS
Refills: 0 | Status: DISCONTINUED | OUTPATIENT
Start: 2024-04-30 | End: 2024-05-01

## 2024-04-30 RX ORDER — SODIUM CHLORIDE 9 MG/ML
1750 INJECTION INTRAMUSCULAR; INTRAVENOUS; SUBCUTANEOUS ONCE
Refills: 0 | Status: COMPLETED | OUTPATIENT
Start: 2024-04-30 | End: 2024-04-30

## 2024-04-30 RX ORDER — CEFTRIAXONE 500 MG/1
1000 INJECTION, POWDER, FOR SOLUTION INTRAMUSCULAR; INTRAVENOUS ONCE
Refills: 0 | Status: COMPLETED | OUTPATIENT
Start: 2024-04-30 | End: 2024-04-30

## 2024-04-30 RX ORDER — IBUPROFEN 200 MG
600 TABLET ORAL ONCE
Refills: 0 | Status: COMPLETED | OUTPATIENT
Start: 2024-04-30 | End: 2024-04-30

## 2024-04-30 RX ADMIN — SODIUM CHLORIDE 1750 MILLILITER(S): 9 INJECTION INTRAMUSCULAR; INTRAVENOUS; SUBCUTANEOUS at 12:23

## 2024-04-30 RX ADMIN — SODIUM CHLORIDE 250 MILLILITER(S): 9 INJECTION INTRAMUSCULAR; INTRAVENOUS; SUBCUTANEOUS at 19:39

## 2024-04-30 RX ADMIN — FAMOTIDINE 20 MILLIGRAM(S): 10 INJECTION INTRAVENOUS at 12:23

## 2024-04-30 RX ADMIN — Medication 600 MILLIGRAM(S): at 12:24

## 2024-04-30 RX ADMIN — ONDANSETRON 4 MILLIGRAM(S): 8 TABLET, FILM COATED ORAL at 12:23

## 2024-04-30 RX ADMIN — CEFTRIAXONE 100 MILLIGRAM(S): 500 INJECTION, POWDER, FOR SOLUTION INTRAMUSCULAR; INTRAVENOUS at 12:23

## 2024-04-30 NOTE — ED ADULT NURSE REASSESSMENT NOTE - NS ED NURSE REASSESS COMMENT FT1
patient alert verbal oriented x3 ; aware of admission patient in no visible distress denies pain. pending transport to  04 uris. report provided. VSS

## 2024-04-30 NOTE — ED ADULT NURSE NOTE - NSFALLRISKINTERV_ED_ALL_ED

## 2024-04-30 NOTE — ED PROVIDER NOTE - OBJECTIVE STATEMENT
73-year-old female with history of A-fib, diabetes, general weakness, patient is bedbound with 24/7 home health aide at home.  Usually alert and oriented to person place and time.  Home health aide brought her to the ER because she has been complaining of generalized weakness fatigue decreased p.o. intake for the last 2 to 3 days.  Subjective fever chills, 1-2 episodes of nausea and vomiting and diffuse vague abdominal pain.  On arrival to the ED vitals are stable, patient is not vomiting but slightly pale appearing, answers questions appropriately, ANO x 3 but weak appearing.  Heart normal, lungs clear, abdomen soft nontender nondistended, minimal peripheral edema.  According to the home health aide, patient has episodes of altered mental status when she has a urine infection.  Sepsis bundle was started and patient was given weight-based IV fluids dose to IBW, started ceftriaxone for suspected sepsis/UTI.

## 2024-04-30 NOTE — ED ADULT NURSE NOTE - BREATHING, MLM
Spontaneous, unlabored and symmetrical
Alert-The patient is alert, awake and responds to voice. The patient is oriented to time, place, and person. The triage nurse is able to obtain subjective information.

## 2024-04-30 NOTE — ED PROVIDER NOTE - CLINICAL SUMMARY MEDICAL DECISION MAKING FREE TEXT BOX
Labs noted with WBC elevated to 16.  Initial blood lactate was 3.9.  Electrolytes noted with mild MARIA E.  UA with large LE, .  CT scan done which showed cholelithiasis, no cholecystitis.  Chest x-ray with no pneumonia.  Pending ultrasound abdomen to rule out cholecystitis.  Plan to admit for continued IV antibiotics for UTI.    Prelim ultrasound negative for obvious cholecystitis.  Final radiology read pending.  Accepted for admission to [facility] medicine service by Dr. Tobias.

## 2024-04-30 NOTE — ED PROVIDER NOTE - PROGRESS NOTE DETAILS
Labs noted with WBC elevated to 16.  Initial blood lactate was 3.9.  Electrolytes noted with mild MARIA E.  UA with large LE, .  CT scan done which showed cholelithiasis, no cholecystitis.  Chest x-ray with no pneumonia.  Pending ultrasound abdomen to rule out cholecystitis.  Plan to admit for continued IV antibiotics for UTI. Prelim ultrasound negative for obvious cholecystitis.  Final radiology read pending.  Accepted for admission to [facility] medicine service by Dr. Tobias.

## 2024-04-30 NOTE — ED PROVIDER NOTE - PHYSICAL EXAMINATION
VSS in NAD pale appearing   NCAT EOMI PERRL OP clear dry MM  heart RRR no murmur   lungs CTA no wheezing no rales no rhonchi   abd soft NT ND no CVAT no guarding no rebound

## 2024-04-30 NOTE — ED ADULT NURSE NOTE - OBJECTIVE STATEMENT
here for AMS, cough, epigastric pain and abdominal pain, n/v- - FS in the field 250  pt AxOx4. pt stable, not in any acute distress. care ongoing.

## 2024-05-01 DIAGNOSIS — E78.5 HYPERLIPIDEMIA, UNSPECIFIED: ICD-10-CM

## 2024-05-01 DIAGNOSIS — G93.41 METABOLIC ENCEPHALOPATHY: ICD-10-CM

## 2024-05-01 DIAGNOSIS — R41.82 ALTERED MENTAL STATUS, UNSPECIFIED: ICD-10-CM

## 2024-05-01 DIAGNOSIS — R74.8 ABNORMAL LEVELS OF OTHER SERUM ENZYMES: ICD-10-CM

## 2024-05-01 DIAGNOSIS — A41.9 SEPSIS, UNSPECIFIED ORGANISM: ICD-10-CM

## 2024-05-01 DIAGNOSIS — I48.91 UNSPECIFIED ATRIAL FIBRILLATION: ICD-10-CM

## 2024-05-01 DIAGNOSIS — N17.9 ACUTE KIDNEY FAILURE, UNSPECIFIED: ICD-10-CM

## 2024-05-01 DIAGNOSIS — Z29.9 ENCOUNTER FOR PROPHYLACTIC MEASURES, UNSPECIFIED: ICD-10-CM

## 2024-05-01 DIAGNOSIS — E83.52 HYPERCALCEMIA: ICD-10-CM

## 2024-05-01 DIAGNOSIS — M10.9 GOUT, UNSPECIFIED: ICD-10-CM

## 2024-05-01 DIAGNOSIS — E11.9 TYPE 2 DIABETES MELLITUS WITHOUT COMPLICATIONS: ICD-10-CM

## 2024-05-01 LAB
ADD ON TEST-SPECIMEN IN LAB: SIGNIFICANT CHANGE UP
ALBUMIN SERPL ELPH-MCNC: 3.7 G/DL — SIGNIFICANT CHANGE UP (ref 3.3–5)
ALP SERPL-CCNC: 108 U/L — SIGNIFICANT CHANGE UP (ref 40–120)
ALT FLD-CCNC: 11 U/L — SIGNIFICANT CHANGE UP (ref 10–45)
ANION GAP SERPL CALC-SCNC: 11 MMOL/L — SIGNIFICANT CHANGE UP (ref 5–17)
APPEARANCE UR: ABNORMAL
AST SERPL-CCNC: 13 U/L — SIGNIFICANT CHANGE UP (ref 10–40)
BACTERIA # UR AUTO: ABNORMAL /HPF
BASOPHILS # BLD AUTO: 0.05 K/UL — SIGNIFICANT CHANGE UP (ref 0–0.2)
BASOPHILS NFR BLD AUTO: 0.4 % — SIGNIFICANT CHANGE UP (ref 0–2)
BILIRUB SERPL-MCNC: 0.4 MG/DL — SIGNIFICANT CHANGE UP (ref 0.2–1.2)
BILIRUB UR-MCNC: NEGATIVE — SIGNIFICANT CHANGE UP
BUN SERPL-MCNC: 10 MG/DL — SIGNIFICANT CHANGE UP (ref 7–23)
CALCIUM SERPL-MCNC: 11.9 MG/DL — HIGH (ref 8.4–10.5)
CAST: 2 /LPF — SIGNIFICANT CHANGE UP (ref 0–4)
CHLORIDE SERPL-SCNC: 109 MMOL/L — HIGH (ref 96–108)
CHOLEST SERPL-MCNC: 108 MG/DL — SIGNIFICANT CHANGE UP
CO2 SERPL-SCNC: 25 MMOL/L — SIGNIFICANT CHANGE UP (ref 22–31)
COLOR SPEC: YELLOW — SIGNIFICANT CHANGE UP
CREAT ?TM UR-MCNC: 37 MG/DL — SIGNIFICANT CHANGE UP
CREAT SERPL-MCNC: 0.78 MG/DL — SIGNIFICANT CHANGE UP (ref 0.5–1.3)
DIFF PNL FLD: ABNORMAL
EGFR: 79 ML/MIN/1.73M2 — SIGNIFICANT CHANGE UP
EOSINOPHIL # BLD AUTO: 0.11 K/UL — SIGNIFICANT CHANGE UP (ref 0–0.5)
EOSINOPHIL NFR BLD AUTO: 0.9 % — SIGNIFICANT CHANGE UP (ref 0–6)
GLUCOSE BLDC GLUCOMTR-MCNC: 111 MG/DL — HIGH (ref 70–99)
GLUCOSE BLDC GLUCOMTR-MCNC: 132 MG/DL — HIGH (ref 70–99)
GLUCOSE BLDC GLUCOMTR-MCNC: 152 MG/DL — HIGH (ref 70–99)
GLUCOSE BLDC GLUCOMTR-MCNC: 93 MG/DL — SIGNIFICANT CHANGE UP (ref 70–99)
GLUCOSE SERPL-MCNC: 124 MG/DL — HIGH (ref 70–99)
GLUCOSE UR QL: NEGATIVE MG/DL — SIGNIFICANT CHANGE UP
HCT VFR BLD CALC: 39.5 % — SIGNIFICANT CHANGE UP (ref 34.5–45)
HDLC SERPL-MCNC: 33 MG/DL — LOW
HGB BLD-MCNC: 12.6 G/DL — SIGNIFICANT CHANGE UP (ref 11.5–15.5)
IMM GRANULOCYTES NFR BLD AUTO: 0.3 % — SIGNIFICANT CHANGE UP (ref 0–0.9)
KETONES UR-MCNC: NEGATIVE MG/DL — SIGNIFICANT CHANGE UP
LACTATE SERPL-SCNC: 1.5 MMOL/L — SIGNIFICANT CHANGE UP (ref 0.5–2)
LACTATE SERPL-SCNC: 3 MMOL/L — HIGH (ref 0.5–2)
LEUKOCYTE ESTERASE UR-ACNC: ABNORMAL
LIPID PNL WITH DIRECT LDL SERPL: 56 MG/DL — SIGNIFICANT CHANGE UP
LYMPHOCYTES # BLD AUTO: 1.77 K/UL — SIGNIFICANT CHANGE UP (ref 1–3.3)
LYMPHOCYTES # BLD AUTO: 14.9 % — SIGNIFICANT CHANGE UP (ref 13–44)
MAGNESIUM SERPL-MCNC: 1.7 MG/DL — SIGNIFICANT CHANGE UP (ref 1.6–2.6)
MCHC RBC-ENTMCNC: 31.3 PG — SIGNIFICANT CHANGE UP (ref 27–34)
MCHC RBC-ENTMCNC: 31.9 GM/DL — LOW (ref 32–36)
MCV RBC AUTO: 98.3 FL — SIGNIFICANT CHANGE UP (ref 80–100)
MONOCYTES # BLD AUTO: 0.52 K/UL — SIGNIFICANT CHANGE UP (ref 0–0.9)
MONOCYTES NFR BLD AUTO: 4.4 % — SIGNIFICANT CHANGE UP (ref 2–14)
NEUTROPHILS # BLD AUTO: 9.35 K/UL — HIGH (ref 1.8–7.4)
NEUTROPHILS NFR BLD AUTO: 79.1 % — HIGH (ref 43–77)
NITRITE UR-MCNC: NEGATIVE — SIGNIFICANT CHANGE UP
NON HDL CHOLESTEROL: 75 MG/DL — SIGNIFICANT CHANGE UP
NRBC # BLD: 0 /100 WBCS — SIGNIFICANT CHANGE UP (ref 0–0)
OSMOLALITY UR: 402 MOSM/KG — SIGNIFICANT CHANGE UP (ref 300–900)
PH UR: 7 — SIGNIFICANT CHANGE UP (ref 5–8)
PHOSPHATE SERPL-MCNC: 2.2 MG/DL — LOW (ref 2.5–4.5)
PLATELET # BLD AUTO: 182 K/UL — SIGNIFICANT CHANGE UP (ref 150–400)
POTASSIUM SERPL-MCNC: 3.4 MMOL/L — LOW (ref 3.5–5.3)
POTASSIUM SERPL-SCNC: 3.4 MMOL/L — LOW (ref 3.5–5.3)
PROT ?TM UR-MCNC: 30 MG/DL — HIGH (ref 0–12)
PROT SERPL-MCNC: 6.6 G/DL — SIGNIFICANT CHANGE UP (ref 6–8.3)
PROT UR-MCNC: 30 MG/DL
PROT/CREAT UR-RTO: 0.8 RATIO — HIGH (ref 0–0.2)
RBC # BLD: 4.02 M/UL — SIGNIFICANT CHANGE UP (ref 3.8–5.2)
RBC # FLD: 13.6 % — SIGNIFICANT CHANGE UP (ref 10.3–14.5)
RBC CASTS # UR COMP ASSIST: 7 /HPF — HIGH (ref 0–4)
SODIUM SERPL-SCNC: 145 MMOL/L — SIGNIFICANT CHANGE UP (ref 135–145)
SODIUM UR-SCNC: 102 MMOL/L — SIGNIFICANT CHANGE UP
SP GR SPEC: 1.02 — SIGNIFICANT CHANGE UP (ref 1–1.03)
SQUAMOUS # UR AUTO: 1 /HPF — SIGNIFICANT CHANGE UP (ref 0–5)
TRIGL SERPL-MCNC: 97 MG/DL — SIGNIFICANT CHANGE UP
UROBILINOGEN FLD QL: 0.2 MG/DL — SIGNIFICANT CHANGE UP (ref 0.2–1)
UUN UR-MCNC: 272 MG/DL — SIGNIFICANT CHANGE UP
WBC # BLD: 11.84 K/UL — HIGH (ref 3.8–10.5)
WBC # FLD AUTO: 11.84 K/UL — HIGH (ref 3.8–10.5)
WBC UR QL: 63 /HPF — HIGH (ref 0–5)

## 2024-05-01 PROCEDURE — 99223 1ST HOSP IP/OBS HIGH 75: CPT | Mod: GC

## 2024-05-01 PROCEDURE — 93010 ELECTROCARDIOGRAM REPORT: CPT

## 2024-05-01 RX ORDER — SODIUM CHLORIDE 9 MG/ML
500 INJECTION INTRAMUSCULAR; INTRAVENOUS; SUBCUTANEOUS ONCE
Refills: 0 | Status: COMPLETED | OUTPATIENT
Start: 2024-05-01 | End: 2024-05-01

## 2024-05-01 RX ORDER — DEXTROSE 10 % IN WATER 10 %
125 INTRAVENOUS SOLUTION INTRAVENOUS ONCE
Refills: 0 | Status: DISCONTINUED | OUTPATIENT
Start: 2024-05-01 | End: 2024-05-04

## 2024-05-01 RX ORDER — SODIUM CHLORIDE 9 MG/ML
1000 INJECTION INTRAMUSCULAR; INTRAVENOUS; SUBCUTANEOUS ONCE
Refills: 0 | Status: DISCONTINUED | OUTPATIENT
Start: 2024-05-01 | End: 2024-05-01

## 2024-05-01 RX ORDER — APIXABAN 2.5 MG/1
1 TABLET, FILM COATED ORAL
Refills: 0 | DISCHARGE

## 2024-05-01 RX ORDER — POTASSIUM CHLORIDE 20 MEQ
40 PACKET (EA) ORAL ONCE
Refills: 0 | Status: COMPLETED | OUTPATIENT
Start: 2024-05-01 | End: 2024-05-01

## 2024-05-01 RX ORDER — DEXTROSE 50 % IN WATER 50 %
12.5 SYRINGE (ML) INTRAVENOUS ONCE
Refills: 0 | Status: DISCONTINUED | OUTPATIENT
Start: 2024-05-01 | End: 2024-05-04

## 2024-05-01 RX ORDER — SODIUM CHLORIDE 9 MG/ML
1000 INJECTION, SOLUTION INTRAVENOUS
Refills: 0 | Status: DISCONTINUED | OUTPATIENT
Start: 2024-05-01 | End: 2024-05-04

## 2024-05-01 RX ORDER — PIPERACILLIN AND TAZOBACTAM 4; .5 G/20ML; G/20ML
3.38 INJECTION, POWDER, LYOPHILIZED, FOR SOLUTION INTRAVENOUS ONCE
Refills: 0 | Status: COMPLETED | OUTPATIENT
Start: 2024-05-01 | End: 2024-05-01

## 2024-05-01 RX ORDER — DEXTROSE 50 % IN WATER 50 %
15 SYRINGE (ML) INTRAVENOUS ONCE
Refills: 0 | Status: DISCONTINUED | OUTPATIENT
Start: 2024-05-01 | End: 2024-05-04

## 2024-05-01 RX ORDER — POTASSIUM PHOSPHATE, MONOBASIC POTASSIUM PHOSPHATE, DIBASIC 236; 224 MG/ML; MG/ML
15 INJECTION, SOLUTION INTRAVENOUS ONCE
Refills: 0 | Status: COMPLETED | OUTPATIENT
Start: 2024-05-01 | End: 2024-05-01

## 2024-05-01 RX ORDER — PIPERACILLIN AND TAZOBACTAM 4; .5 G/20ML; G/20ML
3.38 INJECTION, POWDER, LYOPHILIZED, FOR SOLUTION INTRAVENOUS EVERY 8 HOURS
Refills: 0 | Status: DISCONTINUED | OUTPATIENT
Start: 2024-05-01 | End: 2024-05-01

## 2024-05-01 RX ORDER — ERTAPENEM SODIUM 1 G/1
1000 INJECTION, POWDER, LYOPHILIZED, FOR SOLUTION INTRAMUSCULAR; INTRAVENOUS EVERY 24 HOURS
Refills: 0 | Status: DISCONTINUED | OUTPATIENT
Start: 2024-05-01 | End: 2024-05-01

## 2024-05-01 RX ORDER — ERTAPENEM SODIUM 1 G/1
1000 INJECTION, POWDER, LYOPHILIZED, FOR SOLUTION INTRAMUSCULAR; INTRAVENOUS EVERY 24 HOURS
Refills: 0 | Status: DISCONTINUED | OUTPATIENT
Start: 2024-05-02 | End: 2024-05-03

## 2024-05-01 RX ORDER — ERTAPENEM SODIUM 1 G/1
1000 INJECTION, POWDER, LYOPHILIZED, FOR SOLUTION INTRAMUSCULAR; INTRAVENOUS ONCE
Refills: 0 | Status: COMPLETED | OUTPATIENT
Start: 2024-05-01 | End: 2024-05-01

## 2024-05-01 RX ORDER — ATORVASTATIN CALCIUM 80 MG/1
80 TABLET, FILM COATED ORAL AT BEDTIME
Refills: 0 | Status: DISCONTINUED | OUTPATIENT
Start: 2024-05-01 | End: 2024-05-04

## 2024-05-01 RX ORDER — ATORVASTATIN CALCIUM 80 MG/1
1 TABLET, FILM COATED ORAL
Refills: 0 | DISCHARGE

## 2024-05-01 RX ORDER — APIXABAN 2.5 MG/1
5 TABLET, FILM COATED ORAL EVERY 12 HOURS
Refills: 0 | Status: DISCONTINUED | OUTPATIENT
Start: 2024-05-01 | End: 2024-05-04

## 2024-05-01 RX ORDER — GLUCAGON INJECTION, SOLUTION 0.5 MG/.1ML
1 INJECTION, SOLUTION SUBCUTANEOUS ONCE
Refills: 0 | Status: DISCONTINUED | OUTPATIENT
Start: 2024-05-01 | End: 2024-05-04

## 2024-05-01 RX ORDER — CEFTRIAXONE 500 MG/1
1000 INJECTION, POWDER, FOR SOLUTION INTRAMUSCULAR; INTRAVENOUS EVERY 24 HOURS
Refills: 0 | Status: DISCONTINUED | OUTPATIENT
Start: 2024-05-01 | End: 2024-05-01

## 2024-05-01 RX ORDER — DEXTROSE 50 % IN WATER 50 %
25 SYRINGE (ML) INTRAVENOUS ONCE
Refills: 0 | Status: DISCONTINUED | OUTPATIENT
Start: 2024-05-01 | End: 2024-05-04

## 2024-05-01 RX ORDER — INSULIN LISPRO 100/ML
VIAL (ML) SUBCUTANEOUS
Refills: 0 | Status: DISCONTINUED | OUTPATIENT
Start: 2024-05-01 | End: 2024-05-04

## 2024-05-01 RX ORDER — METFORMIN HYDROCHLORIDE 850 MG/1
1 TABLET ORAL
Refills: 0 | DISCHARGE

## 2024-05-01 RX ORDER — ALLOPURINOL 300 MG
300 TABLET ORAL DAILY
Refills: 0 | Status: DISCONTINUED | OUTPATIENT
Start: 2024-05-01 | End: 2024-05-04

## 2024-05-01 RX ORDER — SITAGLIPTIN 50 MG/1
1 TABLET, FILM COATED ORAL
Refills: 0 | DISCHARGE

## 2024-05-01 RX ORDER — ALLOPURINOL 300 MG
1 TABLET ORAL
Refills: 0 | DISCHARGE

## 2024-05-01 RX ADMIN — SODIUM CHLORIDE 500 MILLILITER(S): 9 INJECTION INTRAMUSCULAR; INTRAVENOUS; SUBCUTANEOUS at 17:47

## 2024-05-01 RX ADMIN — Medication 300 MILLIGRAM(S): at 12:19

## 2024-05-01 RX ADMIN — Medication 40 MILLIEQUIVALENT(S): at 13:46

## 2024-05-01 RX ADMIN — ERTAPENEM SODIUM 120 MILLIGRAM(S): 1 INJECTION, POWDER, LYOPHILIZED, FOR SOLUTION INTRAMUSCULAR; INTRAVENOUS at 13:46

## 2024-05-01 RX ADMIN — APIXABAN 5 MILLIGRAM(S): 2.5 TABLET, FILM COATED ORAL at 17:46

## 2024-05-01 RX ADMIN — Medication 2: at 17:49

## 2024-05-01 RX ADMIN — PIPERACILLIN AND TAZOBACTAM 25 GRAM(S): 4; .5 INJECTION, POWDER, LYOPHILIZED, FOR SOLUTION INTRAVENOUS at 12:19

## 2024-05-01 RX ADMIN — ATORVASTATIN CALCIUM 80 MILLIGRAM(S): 80 TABLET, FILM COATED ORAL at 22:08

## 2024-05-01 RX ADMIN — POTASSIUM PHOSPHATE, MONOBASIC POTASSIUM PHOSPHATE, DIBASIC 62.5 MILLIMOLE(S): 236; 224 INJECTION, SOLUTION INTRAVENOUS at 18:42

## 2024-05-01 NOTE — DIETITIAN INITIAL EVALUATION ADULT - ADD RECOMMEND
1. Change diet to Consistent Carbohydrate {no snack}; DASH/TLC   - Encourage adequate protein intake  - RD will continue to monitor POCT BG  - Honor food preferences, as medically able  - Appreciate PO intake % documentation in RN flowsheets  2. Consider micronutrients to assist wound healing:  - MVI for general nutrient coverage  - Ascorbic acid 500 mg BID  - Zinc sulfate 220 mg/d x 14 days total  3. Appreciate weekly weight trends  4. Ongoing diet education

## 2024-05-01 NOTE — H&P ADULT - ASSESSMENT
73-year-old female with history of A-fib, diabetes, HLD, gout who was brought in ER for evaluation of 2-3 day hx of non-specific systemic symptoms and diffuse vague abdominal pain found to have severe sepsis due to UTI, admitted to [facility] with plan to treat with Ertapenem based on prior urine culture sensitives

## 2024-05-01 NOTE — H&P ADULT - PROBLEM SELECTOR PLAN 3
On admission Ca 11.8.   DDX: concern for Increased Bone resporiton given Alk phosph elevated to 137     Plan:   f/u PTH, Vit D On admission:  creatinine1.38. Prior creatinien on Jan 2023 was 1.00  DDX; Likely prerenal iso sepsis     Plan:   -f/u urine studies   -f/u bladder scan   -repeat creatinine  -consider renal US On admission: Creatinine1.38. Prior creatinine on Jan 2023 was 1.00  Bladder scan 166cc  DDX; Likely prerenal iso sepsis     Plan:   -f/u urine studies    -CTM creatinine  -CTM urine output  -consider renal US

## 2024-05-01 NOTE — H&P ADULT - HISTORY OF PRESENT ILLNESS
73-year-old female with history of A-fib, diabetes who was brought in ER bwith 2-3 day hx of generalized weakness,  fatigue decreased p.o. intake as well as subjective fever chills, 1-2 episodes of nausea, vomiting and diffuse vague abdominal pain.   At baseline patient AOx3, bedbound with 24/7 home health aide at home.  According to the home health aide, patient has episodes of altered mental status when she has a urine infection.  Of note patient was elvaluated in the ED in Jan 2023, found to have UTI, initally treated with 5 day coures of cefuroxime  the found to have urine cltx positive for klebsiella ESB, cefuroxime was then changed to bactrim     In the ED:   VS: T 98.2, /96, HR 89->99,  RR 18, SPo2 95%   Labs: WBC 16.4 (with 92% Neutophil) , Hgb 15.2, Plt 196, INR 1.32, Pt 14.4, aPTT 35, Na 141, K 3.7, Bicarb 24, AG 12, creatinine 1.38, Ca 11.8, Alk phosph 137, AST22, ALT 13, Lactate 3.9, UA: LE large, Nitrite negative, Bacteria moderate,  and RBC 5 with small Blood, and no squamous epithelial cells   RVP negative   Imaging:   -CT a/p with IV contrast:   Mild bibasilar atelectasis, Cholelithiasis without evidence of cholecystitists, non-obstructing renal stones bilaterally, no hydronephrosis. 0.3 cm stone vs intramural caclifictions at the posterior bladdder. No evidence of bowel obstruction   -RUQ US: Cholelithiasis, no additional secondary sonographic evidence of acute cholelithaisis, Echogenic right kidney which may suggest medical renal disease. Right posterior lateral bladder calcification   CXR: Lungs are clear, no acute pathology     Interventions:   Ceftriaxone 1g, Famotidine IV 20, Zofran 4mg IV 1x, NS 1750cc 1x, Ibuprofen 600 p.o 1x,    73-year-old female with history of A-fib, diabetes, HLD, gout who was brought in ER for evaluation of 2-3 day hx of generalized weakness,  fatigue, decreased p.o. intake as well as subjective fever chills, As per the ED note, patient endorsed 1-2 episodes of nausea, vomiting and diffuse vague abdominal pain. According to the home health aide, at baseline patient AOx3, bedbound with 24/7 home health aide. As per health proxy, patient became bedbound after fall during COVID pandemic. As per health aide and health proxy has episodes of altered mental status when she has a urine infection. Of note patient was evaluated in the ED in Jan 2023, found to have UTI. She was initially treated with 5 day course of cefuroxime  the found to have urine cltx positive for klebsiella ESB, cefuroxime was then changed to bactrim.     In the ED:   VS: T 98.2, /96, HR 89->99,  RR 18, SPo2 95%   Labs: WBC 16.4 (with 92% Neutophil) , Hgb 15.2, Plt 196, INR 1.32, Pt 14.4, aPTT 35, Na 141, K 3.7, Bicarb 24, AG 12, creatinine 1.38, Ca 11.8, Alk phosph 137, AST22, ALT 13, Lactate 3.9, UA: LE large, Nitrite negative, Bacteria moderate,  and RBC 5 with small Blood, and no squamous epithelial cells   RVP negative   Imaging:   -CT a/p with IV contrast:   Mild bibasilar atelectasis, Cholelithiasis without evidence of cholecystitists, non-obstructing renal stones bilaterally, no hydronephrosis. 0.3 cm stone vs intramural caclifictions at the posterior bladdder. No evidence of bowel obstruction   -RUQ US: Cholelithiasis, no additional secondary sonographic evidence of acute cholelithaisis, Echogenic right kidney which may suggest medical renal disease. Right posterior lateral bladder calcification   CXR: Lungs are clear, no acute pathology     Interventions:   Ceftriaxone 1g, Famotidine IV 20, Zofran 4mg IV 1x, NS 1750cc 1x, Ibuprofen 600 p.o 1x,    73-year-old female with history of A-fib, diabetes, HLD, gout who was brought in ER for evaluation of 2-3 day hx of generalized weakness, decreased p.o. intake, subjective fever,  chills, 1-2 episodes of nausea, vomiting and diffuse vague abdominal pain. According to the home health aide, at baseline patient AOx3, bedbound with 24/7 home health aide. As per health proxy, patient became bedbound after fall during COVID pandemic. As per health aide and health proxy has episodes of altered mental status when she has a urine infection. Of note patient was evaluated in the ED in Jan 2023, found to have UTI. She was initially treated with 5 day course of cefuroxime  the found to have urine cltx positive for klebsiella ESBL, cefuroxime was then changed to bactrim.   Upon evaluation by medicine team, patient denies cough, chest pain, dyspnea, abdominal pain, nausea, vomiting, dysuria, diarrhea.    In the ED:   VS: T 98.2, /96, HR 89->99,  RR 18, SPo2 95%   Labs: WBC 16.4 (with 92% Neutrophiles , Hgb 15.2, Plt 196, INR 1.32, Pt 14.4, aPTT 35, Na 141, K 3.7, Bicarb 24, AG 12, creatinine 1.38, Ca 11.8, Alk phosph 137, AST22, ALT 13, Lactate 3.9, UA: , LE large, Nitrite negative, Bacteria moderate,  and RBC 5 with small Blood, and no squamous epithelial cells RVP negative   Imaging:   -CT a/p with IV contrast:   Mild bibasilar atelectasis, Cholelithiasis without evidence of cholecystitis non-obstructing renal stones bilaterally, no hydronephrosis. 0.3 cm stone vs intramural calcifications at the posterior bladder. No evidence of bowel obstruction   -RUQ US: Cholelithiasis, no additional secondary sonographic evidence of acute cholecystitis, Echogenic right kidney which may suggest medical renal disease. Right posterior lateral bladder calcification   CXR: Lungs are clear, no acute pathology   Interventions:   Ceftriaxone 1g, Famotidine IV 20, Zofran 4mg IV 1x, NS 1750cc 1x, Ibuprofen 600 p.o 1x

## 2024-05-01 NOTE — DIETITIAN INITIAL EVALUATION ADULT - PROBLEM SELECTOR PLAN 3
On admission:  creatinine1.38. Prior creatinien on Jan 2023 was 1.00  DDX; Likely prerenal iso sepsis     Plan:   -f/u urine studies   -f/u bladder scan   -repeat creatinine  -consider renal US

## 2024-05-01 NOTE — DIETITIAN INITIAL EVALUATION ADULT - PERTINENT LABORATORY DATA
05-01    145  |  109<H>  |  10  ----------------------------<  124<H>  3.4<L>   |  25  |  0.78    Ca    11.9<H>      01 May 2024 10:00  Phos  2.2     05-01  Mg     1.7     05-01    TPro  6.6  /  Alb  3.7  /  TBili  0.4  /  DBili  x   /  AST  13  /  ALT  11  /  AlkPhos  108  05-01  POCT Blood Glucose.: 132 mg/dL (05-01-24 @ 03:15)

## 2024-05-01 NOTE — DIETITIAN INITIAL EVALUATION ADULT - PROBLEM SELECTOR PLAN 2
Meets 2/4 SIRS criteria for tachycardia and leukocytosis, with evidence of end organ damage as lactate was elevated. Infectious work up: UA positive, RVP negative,   Lactate downtrending 3.9->3.6   with few nonobstructing stones in bilateral kidneys, including a 0.8 cm stone at the upper right kidney and a 0.5 cm stone in the left renal pelvis.   Of note patient was evaluated in the ED in Jan 2023, found to have UTI, initally treated with 5 day coures of cefuroxime  the found to have urine cltx positive for klebsiella ESB, cefuroxime was then changed to bactrim. U cltx from Jan 2023 sesntitve to Zosyun, but resistant to CTX    s/p CTX in th ED     Plan:   -start zosyn  -Urology consult  -f/u Blood cltx, Ucltx   -Trend lactate to clear

## 2024-05-01 NOTE — H&P ADULT - NSHPPHYSICALEXAM_GEN_ALL_CORE
.  VITAL SIGNS:  T(C): 36.3 (05-01-24 @ 16:48), Max: 37.1 (05-01-24 @ 03:37)  T(F): 97.3 (05-01-24 @ 16:48), Max: 98.7 (05-01-24 @ 03:37)  HR: 90 (05-01-24 @ 16:48) (71 - 90)  BP: 152/90 (05-01-24 @ 16:48) (116/86 - 152/90)  BP(mean): --  RR: 18 (05-01-24 @ 16:48) (16 - 18)  SpO2: 96% (05-01-24 @ 16:48) (96% - 100%)  Wt(kg): --    PHYSICAL EXAM:    Constitutional: Resting comfortably in bed; NAD  Head: NC/AT  Eyes: PERRL, EOMI, clear conjunctiva  ENT: no nasal discharge; uvula midline, no oropharyngeal erythema or exudates; MMM  Neck: supple; no JVD or thyromegaly  Respiratory: CTA B/L; no W/R/R, no retractions  Cardiac: +S1/S2; RRR; no M/R/G;  Gastrointestinal: soft, NT/ND; no rebound or guarding; +BSx4  Extremities: WWP, no clubbing or cyanosis; no peripheral edema  Musculoskeletal: NROM x4; no joint swelling, tenderness or erythema  Vascular: 2+ radial, femoral, DP/PT pulses B/L  Dermatologic: skin warm, dry and intact; no rashes, wounds, or scars  Neurologic: AAOx3; CNII-XII grossly intact; no focal deficits  Psychiatric: affect and characteristics of appearance, verbalizations, behaviors are appropriate .  VITAL SIGNS:  T(C): 36.3 (05-01-24 @ 16:48), Max: 37.1 (05-01-24 @ 03:37)  T(F): 97.3 (05-01-24 @ 16:48), Max: 98.7 (05-01-24 @ 03:37)  HR: 90 (05-01-24 @ 16:48) (71 - 90)  BP: 152/90 (05-01-24 @ 16:48) (116/86 - 152/90)  BP(mean): --  RR: 18 (05-01-24 @ 16:48) (16 - 18)  SpO2: 96% (05-01-24 @ 16:48) (96% - 100%)  Wt(kg): --    PHYSICAL EXAM:    Constitutional: Resting comfortably in bed; NAD  Head: NC/AT  Eyes: PERRL, EOMI, clear conjunctiva  ENT: no nasal discharge; MMM  Neck: supple; no JVD   Respiratory: CTA B/L; no W/R/R, no retractions  Cardiac: +S1/S2; RRR; no M/R/G;  Gastrointestinal: soft, NT/ND; no rebound or guarding; +BSx4  Extremities: WWP, no clubbing or cyanosis; no peripheral edema  Vascular: 2+ radial, DP/PT pulses B/L  Dermatologic: skin warm, dry and intact; no rashes, wounds, or scars  Neurologic: AAOx2 (self and place note time), follows command strength is 4/5 in UE bilaterally and 3/5 in LE bilaterally   Psychiatric: Patient exhibits paranoid delusions expresses concern for medical staff using unknown treatments, or police trying to arrest her .  VITAL SIGNS:  T(C): 36.3 (05-01-24 @ 16:48), Max: 37.1 (05-01-24 @ 03:37)  T(F): 97.3 (05-01-24 @ 16:48), Max: 98.7 (05-01-24 @ 03:37)  HR: 90 (05-01-24 @ 16:48) (71 - 90)  BP: 152/90 (05-01-24 @ 16:48) (116/86 - 152/90)  BP(mean): --  RR: 18 (05-01-24 @ 16:48) (16 - 18)  SpO2: 96% (05-01-24 @ 16:48) (96% - 100%)  Wt(kg): --    PHYSICAL EXAM:    Constitutional: Resting comfortably in bed; NAD  Head: NC/AT  Eyes: PERRL, EOMI, clear conjunctiva  ENT: no nasal discharge; MMM  Neck: supple; no JVD   Respiratory: CTA B/L; no W/R/R, no retractions  Cardiac: +S1/S2; RRR; no M/R/G;  Gastrointestinal: soft, NT/ND; no rebound or guarding; +BSx4  MSK: No CVA tenderness   Extremities: WWP, no clubbing or cyanosis; no peripheral edema  Vascular: 2+ radial, DP/PT pulses B/L  Dermatologic: skin warm, dry and intact; no rashes, wounds, or scars  Neurologic: AAOx2 (self and place note time), follows command strength is 4/5 in UE bilaterally and 3/5 in LE bilaterally   Psychiatric: Patient exhibits paranoid delusions expresses concern for medical staff using unknown treatments, or police trying to arrest her

## 2024-05-01 NOTE — H&P ADULT - NSHPLABSRESULTS_GEN_ALL_CORE
.  LABS:                         12.6   11.84 )-----------( 182      ( 01 May 2024 10:00 )             39.5     05-01    145  |  109<H>  |  10  ----------------------------<  124<H>  3.4<L>   |  25  |  0.78    Ca    11.9<H>      01 May 2024 10:00  Phos  2.2     05-01  Mg     1.7     05-01    TPro  6.6  /  Alb  3.7  /  TBili  0.4  /  DBili  x   /  AST  13  /  ALT  11  /  AlkPhos  108  05-01    PT/INR - ( 30 Apr 2024 12:22 )   PT: 14.4 sec;   INR: 1.32          PTT - ( 30 Apr 2024 12:22 )  PTT:35.0 sec  Urinalysis Basic - ( 01 May 2024 10:00 )    Color: x / Appearance: x / SG: x / pH: x  Gluc: 124 mg/dL / Ketone: x  / Bili: x / Urobili: x   Blood: x / Protein: x / Nitrite: x   Leuk Esterase: x / RBC: x / WBC x   Sq Epi: x / Non Sq Epi: x / Bacteria: x            Lactate, Blood: 3.0 mmol/L (05-01 @ 10:00)      RADIOLOGY, EKG & ADDITIONAL TESTS: Reviewed.

## 2024-05-01 NOTE — H&P ADULT - PROBLEM SELECTOR PLAN 5
Digoxin level <0.2 Alk phosph 137, AST22, ALT 13. On prior labs from Jan 2023, Alk phosph 102 wnl     Plan:   f/u GGT Alk phosph 137 however AST22 and  ALT 13 wnl. On prior labs from Jan 2023, Alk phosph 102 wnl   RUQ US with Cholelithiasis no evidence of cholecystitis. In addition Bile duct wnl  DDX: likely due to bone resorption     Plan:   -f/u GGT

## 2024-05-01 NOTE — PATIENT PROFILE ADULT - NSPROPTRIGHTNOTIFY_GEN_A_NUR
"Anesthesia Transfer of Care Note    Patient: Anette Villa    Procedure(s) Performed: Procedure(s) (LRB):  EGD (ESOPHAGOGASTRODUODENOSCOPY) (N/A)  COLONOSCOPY (N/A)    Patient location: PACU    Anesthesia Type: general    Transport from OR: Transported from OR on room air with adequate spontaneous ventilation    Post pain: adequate analgesia    Post assessment: no apparent anesthetic complications    Post vital signs: stable    Level of consciousness: sedated    Nausea/Vomiting: no nausea/vomiting    Complications: none    Transfer of care protocol was followed      Last vitals: Visit Vitals  BP (!) 141/84 (BP Location: Left arm, Patient Position: Lying)   Pulse 68   Temp 36.5 °C (97.7 °F) (Temporal)   Resp 16   Ht 5' 5" (1.651 m)   Wt 99.8 kg (220 lb)   LMP 04/21/2024   SpO2 100%   Breastfeeding No   BMI 36.61 kg/m²     "
declines

## 2024-05-01 NOTE — DIETITIAN INITIAL EVALUATION ADULT - PROBLEM SELECTOR PLAN 4
On admission Ca 11.8.   DDX: concern for Increased Bone resporiton given Alk phosph elevated to 137     Plan:   f/u PTH, Vit D

## 2024-05-01 NOTE — DIETITIAN INITIAL EVALUATION ADULT - OTHER CALCULATIONS
*using +10% ideal body weight as patient is >120% of ideal body weight. Needs adjusted for obesity and pressure injury.  ideal body weight = 125 lbs; % ideal body weight: 224%

## 2024-05-01 NOTE — H&P ADULT - PROBLEM SELECTOR PLAN 2
Meets 2/4 SIRS criteria for tachycardia and leukocytosis, with evidence of end organ damage as lactate was elevated. Infectious work up: UA positive, RVP negative,   Lactate downtrending 3.9->3.6   with few nonobstructing stones in bilateral kidneys, including a 0.8 cm stone at the upper right kidney and a 0.5 cm stone in the left renal pelvis.   Of note patient was evaluated in the ED in Jan 2023, found to have UTI, initally treated with 5 day coures of cefuroxime  the found to have urine cltx positive for klebsiella ESB, cefuroxime was then changed to bactrim. U cltx from Jan 2023 sesntitve to Zosyun, but resistant to CTX    s/p CTX in th ED     Plan:   -start zosyn  -Urology consult  -f/u Blood cltx, Ucltx   -Trend lactate to clear On admission, patient with paranoid delusions, mental status AOx2. No focal deficits, or concerns for trauma  As per health proxy, at baseline patient is AOx3, no delusions hallucinations. However, per the health proxy she has had similar presentations when has had UTI in the past.   DDX: Likely i/s/o severe sepsis vs hypercalcemia    Plan:  -CTM s/p treatment of sepsis and hypercalcemia  -If persistent may consider Head CT

## 2024-05-01 NOTE — DIETITIAN INITIAL EVALUATION ADULT - OTHER INFO
Per H&P: 73-year-old female with history of A-fib, diabetes who was brought in ER bwith 2-3 day hx of generalized weakness,  fatigue decreased p.o. intake as well as subjective fever chills, 1-2 episodes of nausea, vomiting and diffuse vague abdominal pain.   At baseline patient AOx3, bedbound with 24/7 home health aide at home.  According to the home health aide, patient has episodes of altered mental status when she has a urine infection. Of note patient was elvaluated in the ED in Jan 2023, found to have UTI, initally treated with 5 day coures of cefuroxime  the found to have urine cltx positive for klebsiella ESB, cefuroxime was then changed to bactrim.     Met with pt this AM at bedside. Pt appeared disoriented, however able to answer basic nutrition questions. NKFA nor food intolerances reported. Pt reported good appetite PTA, which persists. Pt denied nausea/vomiting/diarrhea/constipation, last BM this AM 5/1 per RN flowsheets. Pt stated "none of your business" when asked about usual body weight; however declined any recent weight loss/gain. RD reviewed Carbohydrate Consistent diet including sources of carbohydrates, portion sizes, pairing protein with carbohydrates, limiting sugar sweetened beverages in diet and the importance of consistent eating pattern to help optimize glycemic control. Pt declined nutrition focused physical exam, however no overt visual signs of muscle or subcutaneous fat wasting.    *Note: Pt with T2DM. A1C pending per H&P.  - POCT BG x 48 hours: 132 mg/dL (5/1) - 236 mg/dL (4/30).

## 2024-05-01 NOTE — DIETITIAN INITIAL EVALUATION ADULT - PERTINENT MEDS FT
MEDICATIONS  (STANDING):  allopurinol 300 milliGRAM(s) Oral daily  apixaban 5 milliGRAM(s) Oral every 12 hours  atorvastatin 80 milliGRAM(s) Oral at bedtime  dextrose 10% Bolus 125 milliLiter(s) IV Bolus once  dextrose 5%. 1000 milliLiter(s) (50 mL/Hr) IV Continuous <Continuous>  dextrose 5%. 1000 milliLiter(s) (100 mL/Hr) IV Continuous <Continuous>  dextrose 50% Injectable 25 Gram(s) IV Push once  dextrose 50% Injectable 12.5 Gram(s) IV Push once  glucagon  Injectable 1 milliGRAM(s) IntraMuscular once  insulin lispro (ADMELOG) corrective regimen sliding scale   SubCutaneous Before meals and at bedtime  piperacillin/tazobactam IVPB.- 3.375 Gram(s) IV Intermittent once  piperacillin/tazobactam IVPB.. 3.375 Gram(s) IV Intermittent every 8 hours    MEDICATIONS  (PRN):  dextrose Oral Gel 15 Gram(s) Oral once PRN Blood Glucose LESS THAN 70 milliGRAM(s)/deciliter

## 2024-05-01 NOTE — H&P ADULT - PROBLEM SELECTOR PLAN 6
Home med: Apixabn 5 Bid    Plan:   -AC: start Apixaban 5 BID  -HR wnl, CTM Home med: Apixaban 5 Bid.     Plan:   -AC: start Apixaban 5 BID  -HR wnl, CTM

## 2024-05-01 NOTE — H&P ADULT - PROBLEM SELECTOR PLAN 7
Home meds: sitaglitpin 100, metformin 500    Plan:   -f/u A1C  -mISS Home meds: sitagliptin 100, metformin 500    Plan:   -mISS  -f/u A1C

## 2024-05-01 NOTE — H&P ADULT - PROBLEM SELECTOR PLAN 1
Meets 2/4 SIRS criteria for tachycardia and leukocytosis, with evidence of end organ damage as lactate was elevated. Infectious work up: UA positive, RVP negative,   Lactate downtrending 3.9->3.6   with few nonobstructing stones in   bilateral kidneys, including a 0.8 cm stone at the upper right kidney and a 0.5 cm stone in the left renal pelvis.     Plan:   Urology consult Patient with delusions, mental status AO2x    Plan:  -consider Heat CT Meets 2/4 SIRS criteria for tachycardia and leukocytosis, with evidence of end organ damage as lactate was elevated. Infectious work up: UA positive, RVP negative,   Lactate downtrending 3.9->3.6   with few nonobstructing stones in bilateral kidneys, including a 0.8 cm stone at the upper right kidney and a 0.5 cm stone in the left renal pelvis.   Of note patient was evaluated in the ED in Jan 2023, found to have UTI, initally treated with 5 day coures of cefuroxime  the found to have urine cltx positive for klebsiella ESB, cefuroxime was then changed to bactrim. U cltx from Jan 2023 sesntitve to Zosyun, but resistant to CTX    s/p CTX in th ED     Plan:   -start zosyn  -Urology consult  -f/u Blood cltx, Ucltx   -Trend lactate to clear Meets 2/4 SIRS criteria for tachycardia and leukocytosis, with evidence of end organ damage as lactate was elevated likely source UTI. Lactate downtrending 3.9->3.6 s/p 2.75 L of NS   Infectious work up: UA positive, RVP negative, CT with few nonobstructing stones in bilateral kidneys, including a 0.8 cm stone at the upper right kidney and a 0.5 cm stone in the left renal pelvis. Otherwise, CT without signs of infection, CXR clear.  Of note patient was evaluated in the ED in Jan 2023, found to have UTI, initially treated with 5 day course of cefuroxime  the found to have urine cltx positive for klebsiella ESBL, cefuroxime was then changed to bactrim. U cltx from Jan 2023, resistant to CTX, sensitive to Ertapenem  s/p CTX in th ED     Plan:   -start Ertapenem  -f/u Blood cltx, Ucltx   -Trend lactate to clear, may consider additional fluids  -may consider urology consult if infection persistent/worsening

## 2024-05-01 NOTE — CHART NOTE - NSCHARTNOTEFT_GEN_A_CORE
Ms. Andrew is admitted to the general medicine wards w/ severe sepsis due to UTI    I spoke with Sandra Parra (close friend of Ms. Andrew) who was identified as the health care surrogate / health care proxy and we had an extensive conversation about Ms. Andrew 's care and current condition.     Discussed overall goals of care including advanced directives with Ms. Parra. During this dicussion we reviewed the current diagnosis, treatment plan, and likely prognosis. An explanination of advanced directives and MOLST was provided. She identifiesas one of the most important goals is to avoid invasive measures like chest compressions and intubation.  After this conversation decision was made to change status to DNR/Allow Natural Death. MOLST form completed, signed, and placed in chart.     Above was reviewed with attending physician Dr. Bautista .     Ella Terry MD

## 2024-05-01 NOTE — PATIENT PROFILE ADULT - FALL HARM RISK - HARM RISK INTERVENTIONS

## 2024-05-01 NOTE — H&P ADULT - PROBLEM SELECTOR PLAN 10
DVT ppx: Apixaban   Diet: Diabetes Diet  Replete: Mg<2, Ph<3, K<4  Code status: DNR, DNI DVT ppx: Apixaban   Diet: Diabetes Diet  Replete: Mg<2, Ph<3, K<4  Code status: DNR, DNI  Dispo: [facility]

## 2024-05-01 NOTE — H&P ADULT - PROBLEM SELECTOR PLAN 4
PlanL   f/u GGT On admission Ca 11.8.   DDX: concern for Increased Bone resporiton given Alk phosph elevated to 137     Plan:   f/u PTH, Vit D On admission Ca 11.8. s/p 2.75 L of NS   With complications including renal stones, abdominal cramps, AMS   DDX: concern for Increased Bone resorption given Alk phosph elevated      Plan:   -f/u PTH, PTHrP, Vit D  -May consider additional NS as tolerated

## 2024-05-01 NOTE — H&P ADULT - PROBLEM SELECTOR PLAN 8
Home med: Atrovastatin 80     Plan:   start atorvastatin  f/u lipid panel Home med: Atorvastatin 80     Plan:   -start atorvastatin  -f/u lipid panel

## 2024-05-01 NOTE — H&P ADULT - PROBLEM SELECTOR PLAN 9
Home med: allopurinol 300    Plan:   c/w allopurinol Home med: allopurinol 300    Plan:   c/w allopurinol 300

## 2024-05-02 ENCOUNTER — TRANSCRIPTION ENCOUNTER (OUTPATIENT)
Age: 74
End: 2024-05-02

## 2024-05-02 LAB
24R-OH-CALCIDIOL SERPL-MCNC: 46.4 NG/ML — SIGNIFICANT CHANGE UP (ref 30–80)
A1C WITH ESTIMATED AVERAGE GLUCOSE RESULT: 5.9 % — HIGH (ref 4–5.6)
ALBUMIN SERPL ELPH-MCNC: 3.3 G/DL — SIGNIFICANT CHANGE UP (ref 3.3–5)
ALP SERPL-CCNC: 110 U/L — SIGNIFICANT CHANGE UP (ref 40–120)
ALT FLD-CCNC: 12 U/L — SIGNIFICANT CHANGE UP (ref 10–45)
ANION GAP SERPL CALC-SCNC: 9 MMOL/L — SIGNIFICANT CHANGE UP (ref 5–17)
AST SERPL-CCNC: 22 U/L — SIGNIFICANT CHANGE UP (ref 10–40)
BASOPHILS # BLD AUTO: 0.05 K/UL — SIGNIFICANT CHANGE UP (ref 0–0.2)
BASOPHILS NFR BLD AUTO: 0.5 % — SIGNIFICANT CHANGE UP (ref 0–2)
BILIRUB SERPL-MCNC: 0.4 MG/DL — SIGNIFICANT CHANGE UP (ref 0.2–1.2)
BUN SERPL-MCNC: 13 MG/DL — SIGNIFICANT CHANGE UP (ref 7–23)
CALCIUM SERPL-MCNC: 11.3 MG/DL — HIGH (ref 8.4–10.5)
CALCIUM SERPL-MCNC: 11.6 MG/DL — HIGH (ref 8.4–10.5)
CHLORIDE SERPL-SCNC: 111 MMOL/L — HIGH (ref 96–108)
CO2 SERPL-SCNC: 22 MMOL/L — SIGNIFICANT CHANGE UP (ref 22–31)
CREAT SERPL-MCNC: 0.79 MG/DL — SIGNIFICANT CHANGE UP (ref 0.5–1.3)
EGFR: 78 ML/MIN/1.73M2 — SIGNIFICANT CHANGE UP
EOSINOPHIL # BLD AUTO: 0.18 K/UL — SIGNIFICANT CHANGE UP (ref 0–0.5)
EOSINOPHIL NFR BLD AUTO: 1.7 % — SIGNIFICANT CHANGE UP (ref 0–6)
ESTIMATED AVERAGE GLUCOSE: 123 MG/DL — HIGH (ref 68–114)
GLUCOSE BLDC GLUCOMTR-MCNC: 121 MG/DL — HIGH (ref 70–99)
GLUCOSE BLDC GLUCOMTR-MCNC: 127 MG/DL — HIGH (ref 70–99)
GLUCOSE BLDC GLUCOMTR-MCNC: 134 MG/DL — HIGH (ref 70–99)
GLUCOSE BLDC GLUCOMTR-MCNC: 179 MG/DL — HIGH (ref 70–99)
GLUCOSE SERPL-MCNC: 114 MG/DL — HIGH (ref 70–99)
HCT VFR BLD CALC: 43.6 % — SIGNIFICANT CHANGE UP (ref 34.5–45)
HGB BLD-MCNC: 13.5 G/DL — SIGNIFICANT CHANGE UP (ref 11.5–15.5)
IMM GRANULOCYTES NFR BLD AUTO: 0.3 % — SIGNIFICANT CHANGE UP (ref 0–0.9)
LYMPHOCYTES # BLD AUTO: 1.25 K/UL — SIGNIFICANT CHANGE UP (ref 1–3.3)
LYMPHOCYTES # BLD AUTO: 11.6 % — LOW (ref 13–44)
MAGNESIUM SERPL-MCNC: 1.7 MG/DL — SIGNIFICANT CHANGE UP (ref 1.6–2.6)
MCHC RBC-ENTMCNC: 31 GM/DL — LOW (ref 32–36)
MCHC RBC-ENTMCNC: 31.4 PG — SIGNIFICANT CHANGE UP (ref 27–34)
MCV RBC AUTO: 101.4 FL — HIGH (ref 80–100)
MONOCYTES # BLD AUTO: 0.43 K/UL — SIGNIFICANT CHANGE UP (ref 0–0.9)
MONOCYTES NFR BLD AUTO: 4 % — SIGNIFICANT CHANGE UP (ref 2–14)
NEUTROPHILS # BLD AUTO: 8.79 K/UL — HIGH (ref 1.8–7.4)
NEUTROPHILS NFR BLD AUTO: 81.9 % — HIGH (ref 43–77)
NRBC # BLD: 0 /100 WBCS — SIGNIFICANT CHANGE UP (ref 0–0)
PHOSPHATE SERPL-MCNC: 3.4 MG/DL — SIGNIFICANT CHANGE UP (ref 2.5–4.5)
PLATELET # BLD AUTO: 161 K/UL — SIGNIFICANT CHANGE UP (ref 150–400)
POTASSIUM SERPL-MCNC: 4.8 MMOL/L — SIGNIFICANT CHANGE UP (ref 3.5–5.3)
POTASSIUM SERPL-SCNC: 4.8 MMOL/L — SIGNIFICANT CHANGE UP (ref 3.5–5.3)
PROT SERPL-MCNC: 6.3 G/DL — SIGNIFICANT CHANGE UP (ref 6–8.3)
PTH-INTACT FLD-MCNC: 98 PG/ML — HIGH (ref 15–65)
RAPID RVP RESULT: DETECTED
RBC # BLD: 4.3 M/UL — SIGNIFICANT CHANGE UP (ref 3.8–5.2)
RBC # FLD: 13.3 % — SIGNIFICANT CHANGE UP (ref 10.3–14.5)
RV+EV RNA SPEC QL NAA+PROBE: DETECTED
SARS-COV-2 RNA SPEC QL NAA+PROBE: SIGNIFICANT CHANGE UP
SODIUM SERPL-SCNC: 142 MMOL/L — SIGNIFICANT CHANGE UP (ref 135–145)
WBC # BLD: 10.73 K/UL — HIGH (ref 3.8–10.5)
WBC # FLD AUTO: 10.73 K/UL — HIGH (ref 3.8–10.5)

## 2024-05-02 PROCEDURE — 99233 SBSQ HOSP IP/OBS HIGH 50: CPT | Mod: GC

## 2024-05-02 RX ORDER — CHLORHEXIDINE GLUCONATE 213 G/1000ML
1 SOLUTION TOPICAL
Refills: 0 | Status: DISCONTINUED | OUTPATIENT
Start: 2024-05-02 | End: 2024-05-04

## 2024-05-02 RX ORDER — SODIUM CHLORIDE 9 MG/ML
1000 INJECTION INTRAMUSCULAR; INTRAVENOUS; SUBCUTANEOUS
Refills: 0 | Status: DISCONTINUED | OUTPATIENT
Start: 2024-05-02 | End: 2024-05-03

## 2024-05-02 RX ADMIN — SODIUM CHLORIDE 90 MILLILITER(S): 9 INJECTION INTRAMUSCULAR; INTRAVENOUS; SUBCUTANEOUS at 11:08

## 2024-05-02 RX ADMIN — APIXABAN 5 MILLIGRAM(S): 2.5 TABLET, FILM COATED ORAL at 19:02

## 2024-05-02 RX ADMIN — ATORVASTATIN CALCIUM 80 MILLIGRAM(S): 80 TABLET, FILM COATED ORAL at 22:54

## 2024-05-02 RX ADMIN — Medication 300 MILLIGRAM(S): at 11:08

## 2024-05-02 RX ADMIN — ERTAPENEM SODIUM 120 MILLIGRAM(S): 1 INJECTION, POWDER, LYOPHILIZED, FOR SOLUTION INTRAMUSCULAR; INTRAVENOUS at 12:56

## 2024-05-02 RX ADMIN — APIXABAN 5 MILLIGRAM(S): 2.5 TABLET, FILM COATED ORAL at 06:10

## 2024-05-02 NOTE — DISCHARGE NOTE PROVIDER - CARE PROVIDERS DIRECT ADDRESSES
[email or web address]

## 2024-05-02 NOTE — PROGRESS NOTE ADULT - PROBLEM SELECTOR PLAN 4
On admission Ca 11.8. s/p 2.75 L of NS   With complications including renal stones, abdominal cramps, AMS   DDX: concern for Increased Bone resorption given Alk phosph elevated      Plan:   -f/u PTH, PTHrP, Vit D  -May consider additional NS as tolerated Home med: Apixaban 5mg BID. CHADS-VASc 4.   -AC: Apixaban 5mg BID  -Rate control: not needed Patient has history of chronic atrial fibrillation. Home med: Apixaban 5mg BID. CHADS-VASc 4.   -AC: Apixaban 5mg BID  -Rate control: not needed

## 2024-05-02 NOTE — DISCHARGE NOTE PROVIDER - NSDCCPCAREPLAN_GEN_ALL_CORE_FT
PRINCIPAL DISCHARGE DIAGNOSIS  Diagnosis: Upper respiratory virus  Assessment and Plan of Treatment: You were found to have a viral upper respiratory infection, also known as the common cold. After you leave the hospital, please follow-up with your primary care doctor within the next 1-2 weeks.      SECONDARY DISCHARGE DIAGNOSES  Diagnosis: Bacteriuria  Assessment and Plan of Treatment: You were found to have bacteriuria, which is when bacteria is found in the urine. We felt that your presentation was not consistent with urinary tract infection, so antibiotics were stopped. Please seek medical care if you develop symptoms of lower abominal pain, burning with urination, or frequent urination.

## 2024-05-02 NOTE — DISCHARGE NOTE PROVIDER - NSDCMRMEDTOKEN_GEN_ALL_CORE_FT
allopurinol 300 mg oral tablet: 1 tab(s) orally once a day  apixaban 5 mg oral tablet: 1 tab(s) orally 2 times a day  atorvastatin 80 mg oral tablet: 1 tab(s) orally once a day  MetFORMIN (Eqv-Fortamet) 500 mg oral tablet, extended release: 1 tab(s) orally once a day  SITagliptin (as base) 100 mg oral tablet: 1 tab(s) orally once a day

## 2024-05-02 NOTE — DISCHARGE NOTE PROVIDER - HOSPITAL COURSE
#Discharge: do not delete    Patient is __ yo M/F with past medical history of _____ presented with _____, found to have _____ (one liner)    Hospital course (by problem):     Patient was discharged to: (home/[facility]/acute rehab/hospice, etc, and with what services – home health PT/RN? Home O2?)    New medications:   Changes to old medications:  Medications that were stopped:    Items to follow up as outpatient:         #Discharge: do not delete    73-year-old female with history of A-fib, diabetes, HLD, gout who was brought in ER for evaluation of 2-3 day hx of non-specific systemic symptoms and diffuse vague abdominal pain found to have bacteruria and severe sepsis due to entero/rhinovirus.     Hospital course (by problem):   #Severe sepsis.  #Entero/rhinovirus upper respiratory tract infection   #Bacteruria  On admission, meets 2/4 SIRS criteria for tachycardia and leukocytosis, with evidence of end organ damage as lactate was elevated. UA+, UCx growing gram- rods. Lactate downtrending 3.9->3.6 s/p 2.75 L of NS, now cleared. Patient had symptoms of cough and chills, RVP positive for entero/rhinovirus. Symptoms more consistent with viral URI as source of sepsis. Likely bacteruria as opposed to UTI, so empiric ertapenem discontinued.   -Discharge off antibiotics    #Hypercalcemia.   On admission Ca 11.8. s/p 2.75 L of NS. PTH 98, consistent with primary hyperparathyroidism. Current symptom of AMS better attributed to sepsis.   - IVF: NS at 90 mL/hr.     Problem/Plan - 3:  ·  Problem: Metabolic encephalopathy.   ·  Plan: On admission, patient with paranoid delusions, mental status AOx2. No focal deficits, or concerns for trauma  As per health proxy, at baseline patient is AOx3, no delusions hallucinations. However, per the health proxy she has had similar presentations when has had UTI in the past. Likely due to sepsis vs less likely hypercalcemia  -CTM s/p treatment of sepsis and hypercalcemia.     Problem/Plan - 4:  ·  Problem: Atrial fibrillation.   ·  Plan: Patient has history of chronic atrial fibrillation. Home med: Apixaban 5mg BID. CHADS-VASc 4.   -AC: Apixaban 5mg BID  -Rate control: not needed.     Problem/Plan - 5:  ·  Problem: Diabetes.  ·  Plan: Home meds: sitagliptin 100, metformin 500. HgbA1c 5.9.   -mISS.     Problem/Plan - 6:  ·  Problem: Gout.  ·  Plan: Home med: Allopurinol 300mg QD  - c/w home Allopurinol 300mg QD.     Problem/Plan - 7:  ·  Problem: HLD (hyperlipidemia).  ·  Plan: Home med: Atorvastatin 80mg QD  -continue home Atorvastatin 80mg QD.    Patient was discharged to: (home/[facility]/acute rehab/hospice, etc, and with what services – home health PT/RN? Home O2?)    New medications:   Changes to old medications:  Medications that were stopped:    Items to follow up as outpatient:         #Discharge: do not delete    73-year-old female with history of A-fib, diabetes, HLD, gout who was brought in ER for evaluation of 2-3 day hx of non-specific systemic symptoms and diffuse vague abdominal pain found to have bacteruria and severe sepsis due to entero/rhinovirus.     Hospital course (by problem):   #Severe sepsis.  #Entero/rhinovirus upper respiratory tract infection   #Bacteruria  On admission, meets 2/4 SIRS criteria for tachycardia and leukocytosis, with evidence of end organ damage as lactate was elevated. UA+, UCx growing gram- rods. Lactate downtrending 3.9->3.6 s/p 2.75 L of NS, now cleared. Patient had symptoms of cough and chills, RVP positive for entero/rhinovirus. Symptoms more consistent with viral URI as source of sepsis. Likely bacteruria as opposed to UTI, so empiric ertapenem discontinued.   -Discharge off antibiotics    #Primary hyperparathyroidism  #Hypercalcemia.   On admission Ca 11.8. s/p 2.75 L of NS. PTH 98, consistent with primary hyperparathyroidism. Current symptom of AMS better attributed to sepsis. Ca downtrending. S/p NS 90ml/hr  - Outpatient PCP follow-up for definitive primary hyperparathyroidism management.     #Atrial fibrillation.   Patient has history of chronic atrial fibrillation. Home med: Apixaban 5mg BID. CHADS-VASc 4.   -AC: Apixaban 5mg BID    #Diabetes.  Home meds: sitagliptin 100, metformin 500. HgbA1c 5.9.   -discharge with home meds    #Gout.  Home med: Allopurinol 300mg QD  - c/w home Allopurinol 300mg QD.    #HLD (hyperlipidemia).  Home med: Atorvastatin 80mg QD  -continue home Atorvastatin 80mg QD.    Patient was discharged to: home    New medications: none  Changes to old medications: none  Medications that were stopped: none    Items to follow up as outpatient: PCP

## 2024-05-02 NOTE — PROGRESS NOTE ADULT - PROBLEM SELECTOR PLAN 9
Home med: allopurinol 300    Plan:   c/w allopurinol 300 RESOLVED  Alk phosph 137 however AST22 and  ALT 13 wnl. On prior labs from Jan 2023, Alk phosph 102 wnl   RUQ US with Cholelithiasis no evidence of cholecystitis. In addition Bile duct wnl  DDX: likely due to bone resorption DVT ppx: Apixaban   Diet: Consistent Carb Diet  Replete: Mg<2, Ph<3, K<4  Code status: DNR, DNI  Dispo: [facility] RESOLVED  On admission: Creatinine1.38. Prior creatinine on Jan 2023 was 1.00. Bladder scan 166cc. DDX; Likely prerenal iso sepsis. Now resolved

## 2024-05-02 NOTE — DISCHARGE NOTE PROVIDER - CARE PROVIDER_API CALL
Louis Edwards  Internal Medicine  [facility]  [place] [address]  Phone: [phone]  Fax: [phone]  Established Patient  Follow Up Time: 2 weeks

## 2024-05-02 NOTE — PROGRESS NOTE ADULT - PROBLEM SELECTOR PLAN 1
Meets 2/4 SIRS criteria for tachycardia and leukocytosis, with evidence of end organ damage as lactate was elevated likely source UTI. Lactate downtrending 3.9->3.6 s/p 2.75 L of NS   Infectious work up: UA positive, RVP negative, CT with few nonobstructing stones in bilateral kidneys, including a 0.8 cm stone at the upper right kidney and a 0.5 cm stone in the left renal pelvis. Otherwise, CT without signs of infection, CXR clear.  Of note patient was evaluated in the ED in Jan 2023, found to have UTI, initially treated with 5 day course of cefuroxime  the found to have urine cltx positive for klebsiella ESBL, cefuroxime was then changed to bactrim. U cltx from Jan 2023, resistant to CTX, sensitive to Ertapenem  s/p CTX in th ED     Plan:   -start Ertapenem  -f/u Blood cltx, Ucltx   -Trend lactate to clear, may consider additional fluids  -may consider urology consult if infection persistent/worsening Meets 2/4 SIRS criteria for tachycardia and leukocytosis, with evidence of end organ damage as lactate was elevated likely source UTI. UA+, UCx growing gram- rods. Lactate downtrending 3.9->3.6 s/p 2.75 L of NS, now cleared.  Of note patient was evaluated in the ED in Jan 2023, found to have UTI, initially treated with 5 day course of cefuroxime, then found to have UCx positive for klebsiella ESBL resistant to CTX, sensitive to Ertapenem  -Ertapenem 1g QD pending UCx sensitivities  -f/u BCx, UCx Meets 2/4 SIRS criteria for tachycardia and leukocytosis, with evidence of end organ damage as lactate was elevated likely source UTI. UA+, UCx growing gram- rods, symptomatic with suprapubic tenderness. Lactate downtrending 3.9->3.6 s/p 2.75 L of NS, now cleared.  Of note patient was evaluated in the ED in Jan 2023, found to have UTI, initially treated with 5 day course of cefuroxime, then found to have UCx positive for klebsiella ESBL resistant to CTX, sensitive to Ertapenem  -Ertapenem 1g QD pending UCx sensitivities  -f/u BCx, UCx #Suspected UTI  On admission, meets 2/4 SIRS criteria for tachycardia and leukocytosis, with evidence of end organ damage as lactate was elevated likely source UTI. UA+, UCx growing gram- rods, symptomatic with suprapubic tenderness. Lactate downtrending 3.9->3.6 s/p 2.75 L of NS, now cleared.  Of note patient was evaluated in the ED in Jan 2023, found to have UTI, initially treated with 5 day course of cefuroxime, then found to have UCx positive for klebsiella ESBL resistant to CTX, sensitive to Ertapenem  -Ertapenem 1g QD pending UCx sensitivities  -f/u BCx, UCx

## 2024-05-02 NOTE — PROGRESS NOTE ADULT - SUBJECTIVE AND OBJECTIVE BOX
OVERNIGHT EVENTS:    SUBJECTIVE / INTERVAL HPI: Patient seen and examined at bedside. No complaints at this time. ROS otherwise negative.    VITAL SIGNS:  Vital Signs Last 24 Hrs  T(C): 36.2 (02 May 2024 05:14), Max: 36.7 (01 May 2024 20:46)  T(F): 97.2 (02 May 2024 05:14), Max: 98 (01 May 2024 20:46)  HR: 77 (02 May 2024 05:14) (77 - 90)  BP: 146/86 (02 May 2024 05:14) (129/92 - 152/90)  BP(mean): --  RR: 18 (02 May 2024 05:14) (17 - 18)  SpO2: 96% (02 May 2024 05:14) (96% - 97%)    Parameters below as of 02 May 2024 05:14  Patient On (Oxygen Delivery Method): room air        PHYSICAL EXAM:    General: NAD  HEENT: NCAT  Neck: supple, trachea midline  Cardiovascular: S1, S2 normal; RRR, no M/G/R  Respiratory: CTABL; no W/R/R  Gastrointestinal: soft, nontender, nondistended. bowel sounds present.  Skin: no ulcerations or visible rashes appreciated  Extremities: WWP; no edema, clubbing or cyanosis  Vascular: 2+ radial, DP/PT pulses B/L  Neurological: AAOx3; CN II-XII grossly intact; no focal deficits    MEDICATIONS:  MEDICATIONS  (STANDING):  allopurinol 300 milliGRAM(s) Oral daily  apixaban 5 milliGRAM(s) Oral every 12 hours  atorvastatin 80 milliGRAM(s) Oral at bedtime  dextrose 10% Bolus 125 milliLiter(s) IV Bolus once  dextrose 5%. 1000 milliLiter(s) (100 mL/Hr) IV Continuous <Continuous>  dextrose 5%. 1000 milliLiter(s) (50 mL/Hr) IV Continuous <Continuous>  dextrose 50% Injectable 25 Gram(s) IV Push once  dextrose 50% Injectable 12.5 Gram(s) IV Push once  ertapenem  IVPB 1000 milliGRAM(s) IV Intermittent every 24 hours  glucagon  Injectable 1 milliGRAM(s) IntraMuscular once  insulin lispro (ADMELOG) corrective regimen sliding scale   SubCutaneous Before meals and at bedtime    MEDICATIONS  (PRN):  dextrose Oral Gel 15 Gram(s) Oral once PRN Blood Glucose LESS THAN 70 milliGRAM(s)/deciliter      ALLERGIES:  Allergies    codeine (Unknown)    Intolerances        LABS:                        12.6   11.84 )-----------( 182      ( 01 May 2024 10:00 )             39.5     05-01    145  |  109<H>  |  10  ----------------------------<  124<H>  3.4<L>   |  25  |  0.78    Ca    11.9<H>      01 May 2024 10:00  Phos  2.2     05-  Mg     1.7     05-    TPro  6.6  /  Alb  3.7  /  TBili  0.4  /  DBili  x   /  AST  13  /  ALT  11  /  AlkPhos  108  05-01    PT/INR - ( 2024 12:22 )   PT: 14.4 sec;   INR: 1.32          PTT - ( 2024 12:22 )  PTT:35.0 sec  Urinalysis Basic - ( 01 May 2024 22:18 )    Color: Yellow / Appearance: Cloudy / S.025 / pH: x  Gluc: x / Ketone: Negative mg/dL  / Bili: Negative / Urobili: 0.2 mg/dL   Blood: x / Protein: 30 mg/dL / Nitrite: Negative   Leuk Esterase: Large / RBC: 7 /HPF / WBC 63 /HPF   Sq Epi: x / Non Sq Epi: 1 /HPF / Bacteria: Many /HPF      CAPILLARY BLOOD GLUCOSE      POCT Blood Glucose.: 93 mg/dL (01 May 2024 22:03)      RADIOLOGY & ADDITIONAL TESTS: Reviewed. OVERNIGHT EVENTS: none    SUBJECTIVE / INTERVAL HPI: Patient seen and examined at bedside. Patient does not remember what brought her to the hospital. States that she currently has right arm pain and chills. Also seen to be coughing. ROS otherwise negative.    VITAL SIGNS:  Vital Signs Last 24 Hrs  T(C): 36.2 (02 May 2024 05:14), Max: 36.7 (01 May 2024 20:46)  T(F): 97.2 (02 May 2024 05:14), Max: 98 (01 May 2024 20:46)  HR: 77 (02 May 2024 05:14) (77 - 90)  BP: 146/86 (02 May 2024 05:14) (129/92 - 152/90)  BP(mean): --  RR: 18 (02 May 2024 05:14) (17 - 18)  SpO2: 96% (02 May 2024 05:14) (96% - 97%)    Parameters below as of 02 May 2024 05:14  Patient On (Oxygen Delivery Method): room air        PHYSICAL EXAM:    General: NAD  HEENT: NCAT  Neck: supple, trachea midline  Cardiovascular: S1, S2 normal; RRR, no M/G/R  Respiratory: CTABL; no W/R/R  Gastrointestinal: soft, nondistended, +suprapubic tenderness to palpation  Skin: no ulcerations or visible rashes appreciated  Extremities: WWP; no edema, clubbing or cyanosis  Vascular: 2+ radial, DP/PT pulses B/L  Neurological: AAOx2; CN II-XII grossly intact; 3/5 strength in bilateral lower extremities     MEDICATIONS:  MEDICATIONS  (STANDING):  allopurinol 300 milliGRAM(s) Oral daily  apixaban 5 milliGRAM(s) Oral every 12 hours  atorvastatin 80 milliGRAM(s) Oral at bedtime  dextrose 10% Bolus 125 milliLiter(s) IV Bolus once  dextrose 5%. 1000 milliLiter(s) (100 mL/Hr) IV Continuous <Continuous>  dextrose 5%. 1000 milliLiter(s) (50 mL/Hr) IV Continuous <Continuous>  dextrose 50% Injectable 25 Gram(s) IV Push once  dextrose 50% Injectable 12.5 Gram(s) IV Push once  ertapenem  IVPB 1000 milliGRAM(s) IV Intermittent every 24 hours  glucagon  Injectable 1 milliGRAM(s) IntraMuscular once  insulin lispro (ADMELOG) corrective regimen sliding scale   SubCutaneous Before meals and at bedtime    MEDICATIONS  (PRN):  dextrose Oral Gel 15 Gram(s) Oral once PRN Blood Glucose LESS THAN 70 milliGRAM(s)/deciliter      ALLERGIES:  Allergies    codeine (Unknown)    Intolerances        LABS:                        12.6   11.84 )-----------( 182      ( 01 May 2024 10:00 )             39.5     05-01    145  |  109<H>  |  10  ----------------------------<  124<H>  3.4<L>   |  25  |  0.78    Ca    11.9<H>      01 May 2024 10:00  Phos  2.2     05-01  Mg     1.7     05-    TPro  6.6  /  Alb  3.7  /  TBili  0.4  /  DBili  x   /  AST  13  /  ALT  11  /  AlkPhos  108  05-01    PT/INR - ( 2024 12:22 )   PT: 14.4 sec;   INR: 1.32          PTT - ( 2024 12:22 )  PTT:35.0 sec  Urinalysis Basic - ( 01 May 2024 22:18 )    Color: Yellow / Appearance: Cloudy / S.025 / pH: x  Gluc: x / Ketone: Negative mg/dL  / Bili: Negative / Urobili: 0.2 mg/dL   Blood: x / Protein: 30 mg/dL / Nitrite: Negative   Leuk Esterase: Large / RBC: 7 /HPF / WBC 63 /HPF   Sq Epi: x / Non Sq Epi: 1 /HPF / Bacteria: Many /HPF      CAPILLARY BLOOD GLUCOSE      POCT Blood Glucose.: 93 mg/dL (01 May 2024 22:03)      RADIOLOGY & ADDITIONAL TESTS: Reviewed.

## 2024-05-02 NOTE — DISCHARGE NOTE PROVIDER - NSDCFUADDAPPT_GEN_ALL_CORE_FT
(1) Please note that we are currently working on an appointment with your Primary Care Provider, Dr. Louis Edwards within a week following your hospital discharge.    Appointment was facilitated by Ms. GODWIN Mendez, Referral Coordinator. (1) Please note that we are currently working on an appointment with your Primary Care Provider, Dr. Louis Edwards within a week following your hospital discharge.    Appointment is facilitated by Ms. GODWIN Mendez, Referral Coordinator.

## 2024-05-02 NOTE — PROGRESS NOTE ADULT - PROBLEM SELECTOR PLAN 8
Home med: Atorvastatin 80     Plan:   -start atorvastatin  -f/u lipid panel DVT ppx: Apixaban   Diet: Consistent Carb Diet  Replete: Mg<2, Ph<3, K<4  Code status: DNR, DNI  Dispo: [facility] Home med: Atorvastatin 80mg QD  -continue home Atorvastatin 80mg QD.

## 2024-05-02 NOTE — PROGRESS NOTE ADULT - PROBLEM SELECTOR PLAN 3
On admission: Creatinine1.38. Prior creatinine on Jan 2023 was 1.00  Bladder scan 166cc  DDX; Likely prerenal iso sepsis     Plan:   -f/u urine studies    -CTM creatinine  -CTM urine output  -consider renal US On admission, patient with paranoid delusions, mental status AOx2. No focal deficits, or concerns for trauma  As per health proxy, at baseline patient is AOx3, no delusions hallucinations. However, per the health proxy she has had similar presentations when has had UTI in the past. DDX: Likely i/s/o severe sepsis vs hypercalcemia  -CTM s/p treatment of sepsis and hypercalcemia On admission, patient with paranoid delusions, mental status AOx2. No focal deficits, or concerns for trauma  As per health proxy, at baseline patient is AOx3, no delusions hallucinations. However, per the health proxy she has had similar presentations when has had UTI in the past. Likely due to sepsis vs less likely hypercalcemia  -CTM s/p treatment of sepsis and hypercalcemia

## 2024-05-02 NOTE — PROGRESS NOTE ADULT - PROBLEM SELECTOR PLAN 10
DVT ppx: Apixaban   Diet: Diabetes Diet  Replete: Mg<2, Ph<3, K<4  Code status: DNR, DNI  Dispo: RMF Home med: Atorvastatin 80mg QD  -continue home Atorvastatin 80mg QD. RESOLVED  Alk phosph 137 however AST22 and  ALT 13 wnl. On prior labs from Jan 2023, Alk phosph 102 wnl   RUQ US with Cholelithiasis no evidence of cholecystitis. In addition Bile duct wnl  DDX: likely due to bone resorption

## 2024-05-02 NOTE — PROGRESS NOTE ADULT - PROBLEM SELECTOR PLAN 7
Home meds: sitagliptin 100, metformin 500    Plan:   -mISS  -f/u A1C Home med: Allopurinol 300mg QD  - c/w home Allopurinol 300mg QD Home med: Atorvastatin 80mg QD  -continue home Atorvastatin 80mg QD.

## 2024-05-02 NOTE — CHART NOTE - NSCHARTNOTEFT_GEN_A_CORE
Infectious Diseases Anti-infective Approval Note    Medication: Ertapenem  Dose: 1g  Route: IV  Frequency: q24h  Duration**: 2 days  Purpose:  (check one)       Empiric pending cultures: x       Empiric, no culture data:       Final duration:    Dose may be adjusted as needed for alterations in renal function.    *THIS IS NOT AN INFECTIOUS DISEASES CONSULTATION*    **Indicates duration of approval, not necessarily duration of treatment

## 2024-05-02 NOTE — DISCHARGE NOTE PROVIDER - PROVIDER RX CONTACT NUMBER
[facility]   Hospitalist Progress Note     PCP: Kingsley Naylor MD    Treatment Team:    Psychiatry    Chief Complaint:  Etoh WD, depression suicidal ideation    SUBJECTIVE:  Patient is safely detoxed  Ativan was given this am and last night, it seems for the indication of Anxiety  Discussed initiation of lexapro for anxiety management w Atarax PRN    Plan for transfer to Psych       OBJECTIVE:  Temp:  [97.5 °F (36.4 °C)-98.1 °F (36.7 °C)] 98.1 °F (36.7 °C)  Heart Rate:  [67-77] 74  Resp:  [16-18] 16  BP: (119-138)/(70-87) 138/81    Intake/Output:    Intake/Output Summary (Last 24 hours) at 3/2/2023 1443  Last data filed at 3/2/2023 1100  Gross per 24 hour   Intake --   Output 1740 ml   Net -1740 ml         Physical Exam  Physical Exam  Vitals reviewed.   Constitutional:       Appearance: Normal appearance.   HENT:      Head: Normocephalic and atraumatic.      Nose: Nose normal.      Mouth/Throat:      Mouth: Mucous membranes are moist.   Eyes:      Pupils: Pupils are equal, round, and reactive to light.   Cardiovascular:      Rate and Rhythm: Normal rate and regular rhythm.   Pulmonary:      Effort: Pulmonary effort is normal. No respiratory distress.      Breath sounds: Normal breath sounds.   Abdominal:      General: Bowel sounds are normal. There is no distension.      Palpations: Abdomen is soft.      Tenderness: There is no guarding.      Hernia: No hernia is present.      Comments: There is a prominence approximating  the xiphoid process   Musculoskeletal:         General: No swelling, tenderness, deformity or signs of injury. Normal range of motion.   Skin:     General: Skin is warm and dry.   Neurological:      General: No focal deficit present.      Mental Status: He is alert. Mental status is at baseline.      Comments: No further tremors   Psychiatric:         Mood and Affect: Mood normal.             Data Review:       Labs:   Recent Labs   Lab 03/01/23  0602 02/27/23  0736 02/26/23  1812    SODIUM 131*   < > 136   POTASSIUM 4.3   < > 4.0   CHLORIDE 102   < > 105   CO2 24   < > 25   BUN 14   < > 9   CREATININE 0.82   < > 0.89   CALCIUM 8.8   < > 8.5   ALBUMIN  --   --  3.5*   BILIRUBIN  --   --  0.5   ALKPT  --   --  61   GPT  --   --  24   AST  --   --  24   GLUCOSE 104*   < > 98    < > = values in this interval not displayed.     Recent Labs   Lab 02/28/23  0637   WBC 6.9   RBC 3.54*   HGB 12.5*   HCT 37.3*        No results found    Microbiology Results     None             Imaging:  No orders to display       Meds:   Current Facility-Administered Medications   Medication Dose Route Frequency Provider Last Rate Last Admin   • escitalopram (LEXAPRO) tablet 10 mg  10 mg Oral Daily Marbella Grider MD       • enoxaparin (LOVENOX) injection 40 mg  40 mg Subcutaneous Q24H Marbella Grider MD   40 mg at 03/01/23 0815   • folic acid (FOLATE) tablet 1 mg  1 mg Oral Daily Marbella Grider MD   1 mg at 03/02/23 0926   • gabapentin (NEURONTIN) capsule 300 mg  300 mg Oral 3 times per day Marbella Grider MD   300 mg at 03/02/23 1326   • nicotine (NICODERM) 21 MG/24HR patch 1 patch  1 patch Transdermal Daily Marbella Grider MD   1 patch at 03/02/23 0926   • pantoprazole (PROTONIX) EC tablet 40 mg  40 mg Oral QAM AC Marbella Grider MD   40 mg at 03/02/23 0554   • thiamine (VITAMIN B1) tablet 100 mg  100 mg Oral Daily Marbella Grider MD   100 mg at 03/02/23 0926   • sodium chloride (PF) 0.9 % injection 2 mL  2 mL Intracatheter 2 times per day Marbella Grider MD   2 mL at 03/02/23 0927   • Potassium Standard Replacement Protocol (Levels 3.5 and lower)   Does not apply See Admin Instructions Marbella Grider MD       • Magnesium Standard Replacement Protocol   Does not apply See Admin Instructions Marbella Grider MD         Current Facility-Administered Medications   Medication Dose Route  Frequency Provider Last Rate Last Admin     Current Facility-Administered Medications   Medication Dose Route Frequency Provider Last Rate Last Admin   • hydrOXYzine (ATARAX) tablet 50 mg  50 mg Oral Q4H PRN Marbella Grider MD   50 mg at 03/02/23 1326   • metoCLOPramide (REGLAN) tablet 5 mg  5 mg Oral Q6H PRN Marbella Grider MD       • [Held by provider] traZODone (DESYREL) tablet 200 mg  200 mg Oral Nightly PRN Marbella Grider MD       • sodium chloride 0.9 % flush bag 25 mL  25 mL Intravenous PRN Marbella Grider MD       • sodium chloride 0.9 % flush bag 25 mL  25 mL Intravenous PRN Marbella Grider MD       • acetaminophen (TYLENOL) tablet 650 mg  650 mg Oral Q4H PRN Marbella Grider MD       • LORazepam (ATIVAN) tablet 2 mg  2 mg Oral Q1H PRN Marbella Grider MD   2 mg at 03/02/23 0554    Or   • LORazepam (ATIVAN) tablet 3 mg  3 mg Oral Q1H PRN Marbella Grider MD        Or   • LORazepam (ATIVAN) tablet 4 mg  4 mg Oral Q1H PRN Marbella Grider MD        Or   • LORazepam (ATIVAN) injection 2 mg  2 mg Intravenous Q1H PRWILLY Grider MD   2 mg at 02/27/23 1804    Or   • LORazepam (ATIVAN) injection 3 mg  3 mg Intravenous Q1H PRWILLY Grider MD        Or   • LORazepam (ATIVAN) injection 4 mg  4 mg Intravenous Q1H PRWILLY Grider MD              Assessment/Plan:   Assessment: ent is a 74 year old male with PMH sig for Bipolar, depression, alcohol dependence presents w Etoh intoxication and depression w suicidal ideation.     Principal Problem:    Alcohol withdrawal hallucinosis (CMD)    # Etoh dependence with withdrawal  -  on admission  - drinks 15-16 beers + pint of bourbon daily + ativan BID  - treated w CIWA w Ativan  - patient responded to detox measures, he is safe to proceed to the next site of care  - thiamine and folic acid  - seizure precautions     #  Depression w Suicidal ideation  - sitter and suicidal precautions  - Psych consult  - start lexapro today to assist in anxiety w Atarax PRN     # Bipolar disorder  - was on  lamictal in the past, not on current PTA meds  - per Psych     # Nicotine dependence  - cnt nicoderm patch     # Gastritis and doudenal ulcer  - cnt PPI     # Abnormal CT w 09/2022  - 2.3 x 2.5 cm polypoid mass in gastric body endorses  - EGD 09/26 did not demonstrate any polypoid mass, + gastritis noted  - XR inconclusive  - consider repeating imaging outpatient to ensure abnormality is resolved or further clarified.      # Severe Obesity: weight loss to be discussed outpatient       FEN/Ppx  - IVF:  ---  - replete lytes PRN  - Diet: reg diet  - Ppx: lovenox  - Activity:  Code status:   Code Status Information     Code Status    Full Resuscitation        LOC: inpatient day 3  ADOD: medically clear to transfer to Psych    Questions/concerns were discussed with patient and/or family by bedside. Emergency Contact per EMR.       Thank You,  Marbella Grider MD  DM Hospitalist  Pager:[phone]   [phone]

## 2024-05-02 NOTE — PROGRESS NOTE ADULT - ASSESSMENT
73-year-old female with history of A-fib, diabetes, HLD, gout who was brought in ER for evaluation of 2-3 day hx of non-specific systemic symptoms and diffuse vague abdominal pain found to have severe sepsis due to UTI, admitted to [facility] with plan to treat with Ertapenem based on prior urine culture sensitives  73-year-old female with history of A-fib, diabetes, HLD, gout who was brought in ER for evaluation of 2-3 day hx of non-specific systemic symptoms and diffuse vague abdominal pain found to have severe sepsis due to UTI, admitted to [facility] with plan to treat with Ertapenem based on prior urine culture sensitives

## 2024-05-02 NOTE — PROGRESS NOTE ADULT - PROBLEM SELECTOR PLAN 5
Alk phosph 137 however AST22 and  ALT 13 wnl. On prior labs from Jan 2023, Alk phosph 102 wnl   RUQ US with Cholelithiasis no evidence of cholecystitis. In addition Bile duct wnl  DDX: likely due to bone resorption     Plan:   -f/u GGT RESOLVED  On admission: Creatinine1.38. Prior creatinine on Jan 2023 was 1.00. Bladder scan 166cc. DDX; Likely prerenal iso sepsis. Now resolved Home meds: sitagliptin 100, metformin 500. HgbA1c 5.9.   -mISS

## 2024-05-02 NOTE — DISCHARGE NOTE PROVIDER - PROVIDER TOKENS
PROVIDER:[TOKEN:[[id number]],FOLLOWUP:[2 weeks],ESTABLISHEDPATIENT:[T]]

## 2024-05-02 NOTE — PROGRESS NOTE ADULT - PROBLEM SELECTOR PLAN 2
On admission, patient with paranoid delusions, mental status AOx2. No focal deficits, or concerns for trauma  As per health proxy, at baseline patient is AOx3, no delusions hallucinations. However, per the health proxy she has had similar presentations when has had UTI in the past.   DDX: Likely i/s/o severe sepsis vs hypercalcemia    Plan:  -CTM s/p treatment of sepsis and hypercalcemia  -If persistent may consider Head CT On admission Ca 11.8. s/p 2.75 L of NS. PTH 98, consistent with primary hyperparathyroidism. Current symptom of AMS better attributed to sepsis.   - IVF: NS at 90 mL/hr

## 2024-05-02 NOTE — PROGRESS NOTE ADULT - PROBLEM SELECTOR PLAN 6
Home med: Apixaban 5 Bid.     Plan:   -AC: start Apixaban 5 BID  -HR wnl, CTM Home meds: sitagliptin 100, metformin 500. HgbA1c 5.9.   -mISS Home med: Allopurinol 300mg QD  - c/w home Allopurinol 300mg QD

## 2024-05-02 NOTE — DISCHARGE NOTE PROVIDER - NSDCQMPCI_CARD_ALL_CORE
Work-Life Balance: Care Instructions  Your Care Instructions    Do you ever feel like there is not enough time to do all of the things you have to do, and no time at all for the things you enjoy? If so, you are not alone. On average, people in the [place] have worked more and more hours each year since 1970. But in recent years, fewer people say they want to take on more at work, even if they would get promoted or get paid more money. More and more workers say they want time to spend with their families and to do things that are important to them. Do you ever feel:  · That you always have more and more work to do at your job? · That too many people depend on you every day? · That you never have enough time for your family or friends? · That you never have time for hobbies or things you enjoy? · That each second of your day is scheduled? If you answered \"yes\" to any of these questions, take steps at work and at home to get your life into balance. Follow-up care is a key part of your treatment and safety. Be sure to make and go to all appointments, and call your doctor if you are having problems. How can you care for yourself at home? Manage your time  · Focus on the important things. Taking on too much can wear you out. Look at how you spend your time, and redirect your focus. Learn to say \"no\" and let go of things that do not matter. · Set one small goal at a time. Use a day planner. Break large projects into smaller ones. · Ask for help. Let your children, your spouse, your coworkers, and other people in your life help you get things done. · Leave your job at the office. If you give up free time to get more work done, you may pay for it with stress. If your job offers a flexible work schedule, use it to fit your own work style. For instance, come in earlier to have a longer lunch break, or make time for a yoga class or workout during your workday. · Unplug.  Do not let technology (such as your cell phone or the Internet) erase the line between your time and your employer's time. Lower job stress  Job stress causes trouble at work and at home. At work, you may worry about things you have not had time to do at home. At home, you may worry about your job. This cycle upsets your work-life balance. Lowering your job stress can get your life back in balance. Job stress can be caused by:  · Pressure and deadlines. · Heavy workloads or long hours. · Not being allowed to make decisions. · Health and safety hazards. · Feeling you may lose your job. · Unclear or changing job duties. · Too much responsibility. · Work that is very tiring or boring. Do any of these things bother you? Consider talking with your boss to change things. There are some things that you may not be able to control. But even a few small changes might help lower your stress. Take advantage of programs at work  Businesses make money and are better off in other ways if their employees are healthy and happy. For this reason, many companies have programs to help balance work life and home life. These programs may include:  · Flexible schedules and hours. · Time off for family reasons, education, or community service. · Being able to work from home. · Employee assistance programs to provide counseling. · Child-care programs. Check to see if your company has any of these or other programs that could help you. If not, consider talking to your boss about why work-life balance programs make good business sense. Even if your company does not start an official program, you may be able to get flexible hours, time off, or the ability to do some work from home. Know when to quit  If you are truly unhappy because of a stressful job, and if the suggestions here have not worked, it may be time to think about changing jobs or changing careers. But before you quit, take time to research your options. Where can you learn more?   Go to http://aissatou-yasir.info/. Enter O955 in the search box to learn more about \"Work-Life Balance: Care Instructions. \"  Current as of: July 26, 2016  Content Version: 11.4  © 1312-4049 [employer]. Care instructions adapted under license by [employer] (which disclaims liability or warranty for this information). If you have questions about a medical condition or this instruction, always ask your healthcare professional. [address] any warranty or liability for your use of this information. No

## 2024-05-02 NOTE — DISCHARGE NOTE PROVIDER - DETAILS OF MALNUTRITION DIAGNOSIS/DIAGNOSES
This patient has been assessed with a concern for Malnutrition and was treated during this hospitalization for the following Nutrition diagnosis/diagnoses:     -  05/01/2024: Morbid obesity (BMI > 40)

## 2024-05-03 ENCOUNTER — TRANSCRIPTION ENCOUNTER (OUTPATIENT)
Age: 74
End: 2024-05-03

## 2024-05-03 LAB
-  AMOXICILLIN/CLAVULANIC ACID: SIGNIFICANT CHANGE UP
-  AMPICILLIN/SULBACTAM: SIGNIFICANT CHANGE UP
-  AMPICILLIN: SIGNIFICANT CHANGE UP
-  CEFAZOLIN: SIGNIFICANT CHANGE UP
-  CEFEPIME: SIGNIFICANT CHANGE UP
-  CEFTRIAXONE: SIGNIFICANT CHANGE UP
-  CEFUROXIME: SIGNIFICANT CHANGE UP
-  CIPROFLOXACIN: SIGNIFICANT CHANGE UP
-  ERTAPENEM: SIGNIFICANT CHANGE UP
-  GENTAMICIN: SIGNIFICANT CHANGE UP
-  IMIPENEM: SIGNIFICANT CHANGE UP
-  LEVOFLOXACIN: SIGNIFICANT CHANGE UP
-  MEROPENEM: SIGNIFICANT CHANGE UP
-  NITROFURANTOIN: SIGNIFICANT CHANGE UP
-  PIPERACILLIN/TAZOBACTAM: SIGNIFICANT CHANGE UP
-  TOBRAMYCIN: SIGNIFICANT CHANGE UP
-  TRIMETHOPRIM/SULFAMETHOXAZOLE: SIGNIFICANT CHANGE UP
ANION GAP SERPL CALC-SCNC: 8 MMOL/L — SIGNIFICANT CHANGE UP (ref 5–17)
BASOPHILS # BLD AUTO: 0.05 K/UL — SIGNIFICANT CHANGE UP (ref 0–0.2)
BASOPHILS NFR BLD AUTO: 0.5 % — SIGNIFICANT CHANGE UP (ref 0–2)
BUN SERPL-MCNC: 15 MG/DL — SIGNIFICANT CHANGE UP (ref 7–23)
CALCIUM SERPL-MCNC: 11 MG/DL — HIGH (ref 8.4–10.5)
CHLORIDE SERPL-SCNC: 112 MMOL/L — HIGH (ref 96–108)
CO2 SERPL-SCNC: 25 MMOL/L — SIGNIFICANT CHANGE UP (ref 22–31)
CREAT SERPL-MCNC: 0.76 MG/DL — SIGNIFICANT CHANGE UP (ref 0.5–1.3)
CULTURE RESULTS: ABNORMAL
EGFR: 82 ML/MIN/1.73M2 — SIGNIFICANT CHANGE UP
EOSINOPHIL # BLD AUTO: 0.22 K/UL — SIGNIFICANT CHANGE UP (ref 0–0.5)
EOSINOPHIL NFR BLD AUTO: 2.3 % — SIGNIFICANT CHANGE UP (ref 0–6)
GLUCOSE BLDC GLUCOMTR-MCNC: 106 MG/DL — HIGH (ref 70–99)
GLUCOSE BLDC GLUCOMTR-MCNC: 158 MG/DL — HIGH (ref 70–99)
GLUCOSE BLDC GLUCOMTR-MCNC: 173 MG/DL — HIGH (ref 70–99)
GLUCOSE BLDC GLUCOMTR-MCNC: 176 MG/DL — HIGH (ref 70–99)
GLUCOSE SERPL-MCNC: 121 MG/DL — HIGH (ref 70–99)
HCT VFR BLD CALC: 42.6 % — SIGNIFICANT CHANGE UP (ref 34.5–45)
HGB BLD-MCNC: 13.2 G/DL — SIGNIFICANT CHANGE UP (ref 11.5–15.5)
IMM GRANULOCYTES NFR BLD AUTO: 0.4 % — SIGNIFICANT CHANGE UP (ref 0–0.9)
LYMPHOCYTES # BLD AUTO: 1.5 K/UL — SIGNIFICANT CHANGE UP (ref 1–3.3)
LYMPHOCYTES # BLD AUTO: 15.6 % — SIGNIFICANT CHANGE UP (ref 13–44)
MAGNESIUM SERPL-MCNC: 1.7 MG/DL — SIGNIFICANT CHANGE UP (ref 1.6–2.6)
MCHC RBC-ENTMCNC: 31 GM/DL — LOW (ref 32–36)
MCHC RBC-ENTMCNC: 31.1 PG — SIGNIFICANT CHANGE UP (ref 27–34)
MCV RBC AUTO: 100.2 FL — HIGH (ref 80–100)
METHOD TYPE: SIGNIFICANT CHANGE UP
MONOCYTES # BLD AUTO: 0.49 K/UL — SIGNIFICANT CHANGE UP (ref 0–0.9)
MONOCYTES NFR BLD AUTO: 5.1 % — SIGNIFICANT CHANGE UP (ref 2–14)
NEUTROPHILS # BLD AUTO: 7.31 K/UL — SIGNIFICANT CHANGE UP (ref 1.8–7.4)
NEUTROPHILS NFR BLD AUTO: 76.1 % — SIGNIFICANT CHANGE UP (ref 43–77)
NRBC # BLD: 0 /100 WBCS — SIGNIFICANT CHANGE UP (ref 0–0)
ORGANISM # SPEC MICROSCOPIC CNT: ABNORMAL
ORGANISM # SPEC MICROSCOPIC CNT: SIGNIFICANT CHANGE UP
PHOSPHATE SERPL-MCNC: 2.8 MG/DL — SIGNIFICANT CHANGE UP (ref 2.5–4.5)
PLATELET # BLD AUTO: 171 K/UL — SIGNIFICANT CHANGE UP (ref 150–400)
POTASSIUM SERPL-MCNC: 4.4 MMOL/L — SIGNIFICANT CHANGE UP (ref 3.5–5.3)
POTASSIUM SERPL-SCNC: 4.4 MMOL/L — SIGNIFICANT CHANGE UP (ref 3.5–5.3)
RBC # BLD: 4.25 M/UL — SIGNIFICANT CHANGE UP (ref 3.8–5.2)
RBC # FLD: 13.2 % — SIGNIFICANT CHANGE UP (ref 10.3–14.5)
SODIUM SERPL-SCNC: 145 MMOL/L — SIGNIFICANT CHANGE UP (ref 135–145)
SPECIMEN SOURCE: SIGNIFICANT CHANGE UP
WBC # BLD: 9.61 K/UL — SIGNIFICANT CHANGE UP (ref 3.8–10.5)
WBC # FLD AUTO: 9.61 K/UL — SIGNIFICANT CHANGE UP (ref 3.8–10.5)

## 2024-05-03 PROCEDURE — 99232 SBSQ HOSP IP/OBS MODERATE 35: CPT | Mod: GC

## 2024-05-03 RX ADMIN — APIXABAN 5 MILLIGRAM(S): 2.5 TABLET, FILM COATED ORAL at 06:37

## 2024-05-03 RX ADMIN — APIXABAN 5 MILLIGRAM(S): 2.5 TABLET, FILM COATED ORAL at 18:29

## 2024-05-03 RX ADMIN — CHLORHEXIDINE GLUCONATE 1 APPLICATION(S): 213 SOLUTION TOPICAL at 06:37

## 2024-05-03 RX ADMIN — Medication 2: at 12:51

## 2024-05-03 RX ADMIN — Medication 2: at 22:48

## 2024-05-03 RX ADMIN — ATORVASTATIN CALCIUM 80 MILLIGRAM(S): 80 TABLET, FILM COATED ORAL at 22:49

## 2024-05-03 RX ADMIN — Medication 300 MILLIGRAM(S): at 12:51

## 2024-05-03 RX ADMIN — Medication 2: at 18:30

## 2024-05-03 NOTE — PROGRESS NOTE ADULT - PROBLEM SELECTOR PLAN 8
DVT ppx: Apixaban   Diet: Consistent Carb Diet  Replete: Mg<2, Ph<3, K<4  Code status: DNR, DNI  Dispo: [facility]

## 2024-05-03 NOTE — DISCHARGE NOTE NURSING/CASE MANAGEMENT/SOCIAL WORK - NSDCFUADDAPPT_GEN_ALL_CORE_FT
(1) Please note that we are currently working on an appointment with your Primary Care Provider, Dr. Louis Edwards within a week following your hospital discharge.    Appointment is facilitated by Ms. GODWIN Mendez, Referral Coordinator.

## 2024-05-03 NOTE — PROGRESS NOTE ADULT - PROBLEM SELECTOR PLAN 9
RESOLVED  On admission: Creatinine1.38. Prior creatinine on Jan 2023 was 1.00. Bladder scan 166cc. DDX; Likely prerenal iso sepsis. Now resolved

## 2024-05-03 NOTE — PROGRESS NOTE ADULT - PROBLEM SELECTOR PLAN 1
#Entero/rhinovirus upper respiratory tract infection   #Bacteruria  On admission, meets 2/4 SIRS criteria for tachycardia and leukocytosis, with evidence of end organ damage as lactate was elevated. UA+, UCx growing gram- rods. Lactate downtrending 3.9->3.6 s/p 2.75 L of NS, now cleared. Patient had symptoms of cough and chills, RVP positive for entero/rhinovirus. Symptoms more consistent with viral URI as source of sepsis. Likely bacteruria as opposed to UTI, so empiric ertapenem discontinued.   -Discharge off antibiotics

## 2024-05-03 NOTE — PROGRESS NOTE ADULT - ASSESSMENT
73-year-old female with history of A-fib, diabetes, HLD, gout who was brought in ER for evaluation of 2-3 day hx of non-specific systemic symptoms and diffuse vague abdominal pain found to have bacteruria and severe sepsis due to entero/rhinovirus.

## 2024-05-03 NOTE — PROGRESS NOTE ADULT - PROBLEM SELECTOR PLAN 5
Home meds: sitagliptin 100, metformin 500. HgbA1c 5.9.   -mISS while inpatient  -discharge with home meds

## 2024-05-03 NOTE — PROGRESS NOTE ADULT - ATTENDING COMMENTS
73 year old woman with Chronic Afin , Diabetes, HLD , Gout , bed bound at baseline with , has 24/7 hour caregivers admitted to the hospital with generalized weakness, fatigue     alert , awake , oriented x 2 to 3   UE strength 4+    Impression and Plan   # Severe Sepsis ( POA ), RVP + Enterovirus   # URTI   # Suspected UTI , hx of ESBL Klebsiella - urine cx during this admission also growing same bacteria  ,  Ertapenem received 3 doses ,however this is most likely chronic colonizer and will not need more treatement    # Acute Metabolic Encephalopathy due to sepsis - mental status back to baseline   # Hypercalcemia - most likely due to primary hyperparathyroidism , check VIT D levels , Continue IV Fluids , will need outpatient BMD testing to eval for osteoporosis and then she may be a candidate for Bisphosphonates   # Gout - Continue Allopurinol  # Functional paraplegia   # MARIA E - Pre renal , resolved with IV fluids   # HLD   #  AFib documented in H and p , but no evidence on EKG - ? not on Eliquis , [id number] Score - 3 , given no clear evidence of Afib will not start anticoagulation     Dispo :  return to home , did not leave on 5/3 due to A being available only on 5/4
73 year old woman with Chronic Afin , Diabetes, HLD , Gout , bed bound at baseline with , has 24/7 hour caregivers admitted to the hospital with generalized weakness, fatigue     Impression and Plan   # Severe Sepsis ( POA ), Check RVP , Covid   # Suspected UTI , hx of ESBL Klebsiella , Continue Ertapenem , F/u Urine cultures   # Acute Metabolic Encephalopathy due to sepsis - mental status back to baseline   # Hypercalcemia - most likely due to primary hyperparathyroidism , check VIT D levels , Continue IV Fluids , will need outpatient BMD testing to eval for osteoporosis and then she may be a candidate for Bisphosphonates  # Gout - Continue Allopurinol  # Functional paraplegia   # MARIA E - Pre renal , resolved with IV fluids   # HLD   # Chronic AFib - ? not on Eliquis , [id number] Score - 3 , unclear why not on anticoagulation     Dispo : pending urine cx , return to home

## 2024-05-03 NOTE — PROGRESS NOTE ADULT - SUBJECTIVE AND OBJECTIVE BOX
OVERNIGHT EVENTS: none    SUBJECTIVE / INTERVAL HPI: Patient seen and examined at bedside. Continues to endorse cough and chills. ROS otherwise negative.    VITAL SIGNS:  Vital Signs Last 24 Hrs  T(C): 37.1 (03 May 2024 18:01), Max: 37.1 (03 May 2024 18:01)  T(F): 98.8 (03 May 2024 18:01), Max: 98.8 (03 May 2024 18:01)  HR: 95 (03 May 2024 18:01) (76 - 95)  BP: 140/79 (03 May 2024 18:01) (136/83 - 154/92)  BP(mean): --  RR: 18 (03 May 2024 18:01) (16 - 18)  SpO2: 95% (03 May 2024 18:01) (95% - 97%)    Parameters below as of 03 May 2024 09:00  Patient On (Oxygen Delivery Method): room air        PHYSICAL EXAM:    General: NAD  HEENT: NCAT  Neck: supple, trachea midline  Cardiovascular: S1, S2 normal; RRR, no M/G/R  Respiratory: CTABL; no W/R/R  Gastrointestinal: soft, nondistended, nontender  Skin: no ulcerations or visible rashes appreciated  Extremities: WWP; no edema, clubbing or cyanosis  Vascular: 2+ radial, DP/PT pulses B/L  Neurological: AAOx2; CN II-XII grossly intact; 3/5 strength in bilateral lower extremities     MEDICATIONS:  MEDICATIONS  (STANDING):  allopurinol 300 milliGRAM(s) Oral daily  apixaban 5 milliGRAM(s) Oral every 12 hours  atorvastatin 80 milliGRAM(s) Oral at bedtime  chlorhexidine 2% Cloths 1 Application(s) Topical <User Schedule>  dextrose 10% Bolus 125 milliLiter(s) IV Bolus once  dextrose 5%. 1000 milliLiter(s) (50 mL/Hr) IV Continuous <Continuous>  dextrose 5%. 1000 milliLiter(s) (100 mL/Hr) IV Continuous <Continuous>  dextrose 50% Injectable 25 Gram(s) IV Push once  dextrose 50% Injectable 12.5 Gram(s) IV Push once  glucagon  Injectable 1 milliGRAM(s) IntraMuscular once  insulin lispro (ADMELOG) corrective regimen sliding scale   SubCutaneous Before meals and at bedtime    MEDICATIONS  (PRN):  dextrose Oral Gel 15 Gram(s) Oral once PRN Blood Glucose LESS THAN 70 milliGRAM(s)/deciliter      ALLERGIES:  Allergies    codeine (Unknown)    Intolerances        LABS:                        13.2   9.61  )-----------( 171      ( 03 May 2024 05:30 )             42.6     05-03    145  |  112<H>  |  15  ----------------------------<  121<H>  4.4   |  25  |  0.76    Ca    11.0<H>      03 May 2024 05:30  Phos  2.8     05-03  Mg     1.7     05-03    TPro  6.3  /  Alb  3.3  /  TBili  0.4  /  DBili  x   /  AST  22  /  ALT  12  /  AlkPhos  110  05-02      Urinalysis Basic - ( 03 May 2024 05:30 )    Color: x / Appearance: x / SG: x / pH: x  Gluc: 121 mg/dL / Ketone: x  / Bili: x / Urobili: x   Blood: x / Protein: x / Nitrite: x   Leuk Esterase: x / RBC: x / WBC x   Sq Epi: x / Non Sq Epi: x / Bacteria: x      CAPILLARY BLOOD GLUCOSE      POCT Blood Glucose.: 158 mg/dL (03 May 2024 18:19)      RADIOLOGY & ADDITIONAL TESTS: Reviewed.

## 2024-05-03 NOTE — PROGRESS NOTE ADULT - PROBLEM SELECTOR PLAN 2
#Primary hyperparathyroidism  On admission Ca 11.8. s/p 2.75 L of NS. PTH 98, consistent with primary hyperparathyroidism. Current symptom of AMS better attributed to sepsis. Ca downtrending. S/p NS 90ml/hr  - Outpatient PCP follow-up for definitive primary hyperparathyroidism management.

## 2024-05-03 NOTE — PROGRESS NOTE ADULT - PROBLEM SELECTOR PLAN 4
Patient has history of chronic atrial fibrillation. Home med: Apixaban 5mg BID. CHADS-VASc 4.   -AC: Apixaban 5mg BID  -Rate control: not needed

## 2024-05-03 NOTE — DISCHARGE NOTE NURSING/CASE MANAGEMENT/SOCIAL WORK - NSDCPEELIQUISREACT_GEN_ALL_CORE
Hypertension - controlled, I have counseled patient following healthy balance diet, I would like the patient reduce sodium, exercise routinely, I would like the patient continued the med current medical regiment and we will continue to monitor  Apixaban/Eliquis increases your risk for bleeding. Notify your doctor if you experience any of the following side effects: bleeding, coughing or vomiting blood, red or black stool, unexpected pain or swelling, itching or hives, chest pain, chest tightness, trouble breathing, changes in how much or how often you urinate, red or pink urine, numbness or tingling in your feet, or unusual muscle weakness. When Apixaban/Eliquis is taken with other medicines, they can affect how it works. Taking other medications such as aspirin, blood thinners, nonsteroidal anti-inflammatories, and medications that treat depression can increase your risk of bleeding. It is very important to tell your health care provider about all of the other medicines, including over-the-counter medications, herbs, and vitamins you are taking. DO NOT start, stop, or change the dosage of any medicine, including over-the-counter medicines, vitamins, and herbal products without your doctor’s approval. Any products containing aspirin or are nonsteroidal anti-inflammatories lessen the blood’s ability to form clots and add to the effect of Apixaban/Eliquis. Never take aspirin or medicines that contain aspirin without speaking to your doctor.

## 2024-05-03 NOTE — PROGRESS NOTE ADULT - PROBLEM SELECTOR PLAN 10
RESOLVED  Alk phosph 137 however AST22 and  ALT 13 wnl. On prior labs from Jan 2023, Alk phosph 102 wnl   RUQ US with Cholelithiasis no evidence of cholecystitis. In addition Bile duct wnl  DDX: likely due to bone resorption

## 2024-05-03 NOTE — PROGRESS NOTE ADULT - TIME BILLING
Bedside exam and interview    Review of hospital course, labs, vitals, imaging ,  medical records.   Reviewing outside records   Discharge planning on Interdisciplinary rounds   Discussion with consultants and Primary team   Documenting the encounter.
Bedside exam and interview    Review of hospital course, labs, vitals, imaging ,  medical records.   Reviewing outside records   Discharge planning on Interdisciplinary rounds   Discussion with consultants and Primary team   Documenting the encounter.

## 2024-05-03 NOTE — PROGRESS NOTE ADULT - PROBLEM SELECTOR PLAN 3
On admission, patient with paranoid delusions, mental status AOx2. No focal deficits, or concerns for trauma  As per health proxy, at baseline patient is AOx3, no delusions hallucinations. However, per the health proxy she has had similar presentations when has had UTI in the past. Likely due to sepsis vs less likely hypercalcemia  -CTM s/p treatment of sepsis and hypercalcemia

## 2024-05-03 NOTE — DISCHARGE NOTE NURSING/CASE MANAGEMENT/SOCIAL WORK - PATIENT PORTAL LINK FT
You can access the FollowMyHealth Patient Portal offered by [facility] by registering at the following website: [email or web address]. By joining [employer]’s FollowMyHealth portal, you will also be able to view your health information using other applications (apps) compatible with our system.

## 2024-05-04 VITALS
TEMPERATURE: 98 F | SYSTOLIC BLOOD PRESSURE: 136 MMHG | DIASTOLIC BLOOD PRESSURE: 81 MMHG | HEART RATE: 93 BPM | OXYGEN SATURATION: 94 % | RESPIRATION RATE: 18 BRPM

## 2024-05-04 LAB
GLUCOSE BLDC GLUCOMTR-MCNC: 129 MG/DL — HIGH (ref 70–99)
GLUCOSE BLDC GLUCOMTR-MCNC: 147 MG/DL — HIGH (ref 70–99)

## 2024-05-04 PROCEDURE — 87040 BLOOD CULTURE FOR BACTERIA: CPT

## 2024-05-04 PROCEDURE — 83519 RIA NONANTIBODY: CPT

## 2024-05-04 PROCEDURE — 80061 LIPID PANEL: CPT

## 2024-05-04 PROCEDURE — 85610 PROTHROMBIN TIME: CPT

## 2024-05-04 PROCEDURE — 80048 BASIC METABOLIC PNL TOTAL CA: CPT

## 2024-05-04 PROCEDURE — 83605 ASSAY OF LACTIC ACID: CPT

## 2024-05-04 PROCEDURE — 87086 URINE CULTURE/COLONY COUNT: CPT

## 2024-05-04 PROCEDURE — 84540 ASSAY OF URINE/UREA-N: CPT

## 2024-05-04 PROCEDURE — 82306 VITAMIN D 25 HYDROXY: CPT

## 2024-05-04 PROCEDURE — 82962 GLUCOSE BLOOD TEST: CPT

## 2024-05-04 PROCEDURE — 84100 ASSAY OF PHOSPHORUS: CPT

## 2024-05-04 PROCEDURE — 84156 ASSAY OF PROTEIN URINE: CPT

## 2024-05-04 PROCEDURE — 36415 COLL VENOUS BLD VENIPUNCTURE: CPT

## 2024-05-04 PROCEDURE — 93005 ELECTROCARDIOGRAM TRACING: CPT

## 2024-05-04 PROCEDURE — 96375 TX/PRO/DX INJ NEW DRUG ADDON: CPT

## 2024-05-04 PROCEDURE — 74177 CT ABD & PELVIS W/CONTRAST: CPT | Mod: MC

## 2024-05-04 PROCEDURE — 87637 SARSCOV2&INF A&B&RSV AMP PRB: CPT

## 2024-05-04 PROCEDURE — 81001 URINALYSIS AUTO W/SCOPE: CPT

## 2024-05-04 PROCEDURE — 87186 SC STD MICRODIL/AGAR DIL: CPT

## 2024-05-04 PROCEDURE — 84300 ASSAY OF URINE SODIUM: CPT

## 2024-05-04 PROCEDURE — 80162 ASSAY OF DIGOXIN TOTAL: CPT

## 2024-05-04 PROCEDURE — 85730 THROMBOPLASTIN TIME PARTIAL: CPT

## 2024-05-04 PROCEDURE — 99285 EMERGENCY DEPT VISIT HI MDM: CPT | Mod: 25

## 2024-05-04 PROCEDURE — 71045 X-RAY EXAM CHEST 1 VIEW: CPT

## 2024-05-04 PROCEDURE — 85025 COMPLETE CBC W/AUTO DIFF WBC: CPT

## 2024-05-04 PROCEDURE — 80053 COMPREHEN METABOLIC PANEL: CPT

## 2024-05-04 PROCEDURE — 83036 HEMOGLOBIN GLYCOSYLATED A1C: CPT

## 2024-05-04 PROCEDURE — 82310 ASSAY OF CALCIUM: CPT

## 2024-05-04 PROCEDURE — 84484 ASSAY OF TROPONIN QUANT: CPT

## 2024-05-04 PROCEDURE — 0225U NFCT DS DNA&RNA 21 SARSCOV2: CPT

## 2024-05-04 PROCEDURE — 82570 ASSAY OF URINE CREATININE: CPT

## 2024-05-04 PROCEDURE — 76705 ECHO EXAM OF ABDOMEN: CPT

## 2024-05-04 PROCEDURE — 87077 CULTURE AEROBIC IDENTIFY: CPT

## 2024-05-04 PROCEDURE — 83735 ASSAY OF MAGNESIUM: CPT

## 2024-05-04 PROCEDURE — 83970 ASSAY OF PARATHORMONE: CPT

## 2024-05-04 PROCEDURE — 83935 ASSAY OF URINE OSMOLALITY: CPT

## 2024-05-04 PROCEDURE — 99239 HOSP IP/OBS DSCHRG MGMT >30: CPT

## 2024-05-04 PROCEDURE — 96374 THER/PROPH/DIAG INJ IV PUSH: CPT

## 2024-05-04 RX ADMIN — APIXABAN 5 MILLIGRAM(S): 2.5 TABLET, FILM COATED ORAL at 06:29

## 2024-05-04 RX ADMIN — Medication 300 MILLIGRAM(S): at 12:11

## 2024-05-04 RX ADMIN — CHLORHEXIDINE GLUCONATE 1 APPLICATION(S): 213 SOLUTION TOPICAL at 06:29

## 2024-05-04 NOTE — PROGRESS NOTE ADULT - NUTRITIONAL ASSESSMENT
This patient has been assessed with a concern for Malnutrition and has been determined to have a diagnosis/diagnoses of Morbid obesity (BMI > 40).    This patient is being managed with:   Diet Consistent Carbohydrate/No Snacks-  Entered: May  1 2024  6:02AM  

## 2024-05-04 NOTE — PROGRESS NOTE ADULT - SUBJECTIVE AND OBJECTIVE BOX
Patient is a 73y old  Female who presents with a chief complaint of UTI (03 May 2024 10:32)      INTERVAL HPI/OVERNIGHT EVENTS:       SUBJECTIVE:      Vital Signs Last 24 Hrs  T(C): 36.4 (04 May 2024 06:25), Max: 37.1 (03 May 2024 18:01)  T(F): 97.5 (04 May 2024 06:25), Max: 98.8 (03 May 2024 18:01)  HR: 87 (04 May 2024 06:25) (76 - 95)  BP: 139/92 (04 May 2024 06:25) (135/83 - 154/92)  BP(mean): --  ABP: --  ABP(mean): --  RR: 18 (04 May 2024 06:25) (17 - 18)  SpO2: 95% (04 May 2024 06:25) (95% - 96%)    O2 Parameters below as of 03 May 2024 21:05  Patient On (Oxygen Delivery Method): room air          I&O's Summary        LABS:                        13.2   9.61  )-----------( 171      ( 03 May 2024 05:30 )             42.6     05-03    145  |  112<H>  |  15  ----------------------------<  121<H>  4.4   |  25  |  0.76    Ca    11.0<H>      03 May 2024 05:30  Phos  2.8     05-03  Mg     1.7     05-03        Urinalysis Basic - ( 03 May 2024 05:30 )    Color: x / Appearance: x / SG: x / pH: x  Gluc: 121 mg/dL / Ketone: x  / Bili: x / Urobili: x   Blood: x / Protein: x / Nitrite: x   Leuk Esterase: x / RBC: x / WBC x   Sq Epi: x / Non Sq Epi: x / Bacteria: x      CAPILLARY BLOOD GLUCOSE      POCT Blood Glucose.: 176 mg/dL (03 May 2024 22:21)  POCT Blood Glucose.: 158 mg/dL (03 May 2024 18:19)  POCT Blood Glucose.: 173 mg/dL (03 May 2024 12:42)  POCT Blood Glucose.: 106 mg/dL (03 May 2024 09:24)        RADIOLOGY & ADDITIONAL TESTS:    Consultant(s) Notes Reviewed:  [x ] YES  [ ] NO    MEDICATIONS  (STANDING):  allopurinol 300 milliGRAM(s) Oral daily  apixaban 5 milliGRAM(s) Oral every 12 hours  atorvastatin 80 milliGRAM(s) Oral at bedtime  chlorhexidine 2% Cloths 1 Application(s) Topical <User Schedule>  dextrose 10% Bolus 125 milliLiter(s) IV Bolus once  dextrose 5%. 1000 milliLiter(s) (100 mL/Hr) IV Continuous <Continuous>  dextrose 5%. 1000 milliLiter(s) (50 mL/Hr) IV Continuous <Continuous>  dextrose 50% Injectable 25 Gram(s) IV Push once  dextrose 50% Injectable 12.5 Gram(s) IV Push once  glucagon  Injectable 1 milliGRAM(s) IntraMuscular once  insulin lispro (ADMELOG) corrective regimen sliding scale   SubCutaneous Before meals and at bedtime    MEDICATIONS  (PRN):  dextrose Oral Gel 15 Gram(s) Oral once PRN Blood Glucose LESS THAN 70 milliGRAM(s)/deciliter        Care Discussed with Consultants/Other Providers [ x] YES  [ ] NO

## 2024-05-09 DIAGNOSIS — N20.0 CALCULUS OF KIDNEY: ICD-10-CM

## 2024-05-09 DIAGNOSIS — E66.01 MORBID (SEVERE) OBESITY DUE TO EXCESS CALORIES: ICD-10-CM

## 2024-05-09 DIAGNOSIS — A41.9 SEPSIS, UNSPECIFIED ORGANISM: ICD-10-CM

## 2024-05-09 DIAGNOSIS — R65.20 SEVERE SEPSIS WITHOUT SEPTIC SHOCK: ICD-10-CM

## 2024-05-09 DIAGNOSIS — E11.9 TYPE 2 DIABETES MELLITUS WITHOUT COMPLICATIONS: ICD-10-CM

## 2024-05-09 DIAGNOSIS — N17.9 ACUTE KIDNEY FAILURE, UNSPECIFIED: ICD-10-CM

## 2024-05-09 DIAGNOSIS — E83.52 HYPERCALCEMIA: ICD-10-CM

## 2024-05-09 DIAGNOSIS — F22 DELUSIONAL DISORDERS: ICD-10-CM

## 2024-05-09 DIAGNOSIS — B97.10 UNSPECIFIED ENTEROVIRUS AS THE CAUSE OF DISEASES CLASSIFIED ELSEWHERE: ICD-10-CM

## 2024-05-09 DIAGNOSIS — E78.5 HYPERLIPIDEMIA, UNSPECIFIED: ICD-10-CM

## 2024-05-09 DIAGNOSIS — R74.8 ABNORMAL LEVELS OF OTHER SERUM ENZYMES: ICD-10-CM

## 2024-05-09 DIAGNOSIS — G93.41 METABOLIC ENCEPHALOPATHY: ICD-10-CM

## 2024-05-09 DIAGNOSIS — N39.0 URINARY TRACT INFECTION, SITE NOT SPECIFIED: ICD-10-CM

## 2024-05-09 DIAGNOSIS — M10.9 GOUT, UNSPECIFIED: ICD-10-CM

## 2024-05-09 DIAGNOSIS — Z88.5 ALLERGY STATUS TO NARCOTIC AGENT: ICD-10-CM

## 2024-05-09 DIAGNOSIS — E87.20 ACIDOSIS, UNSPECIFIED: ICD-10-CM

## 2024-05-09 DIAGNOSIS — Z79.01 LONG TERM (CURRENT) USE OF ANTICOAGULANTS: ICD-10-CM

## 2024-05-09 DIAGNOSIS — I48.20 CHRONIC ATRIAL FIBRILLATION, UNSPECIFIED: ICD-10-CM

## 2024-05-09 DIAGNOSIS — R53.2 FUNCTIONAL QUADRIPLEGIA: ICD-10-CM

## 2024-05-09 DIAGNOSIS — Z66 DO NOT RESUSCITATE: ICD-10-CM

## 2024-05-09 DIAGNOSIS — Z74.01 BED CONFINEMENT STATUS: ICD-10-CM

## 2024-05-09 LAB — PTH RELATED PROT SERPL-MCNC: <2 PMOL/L — SIGNIFICANT CHANGE UP

## 2024-07-29 ENCOUNTER — EMERGENCY (EMERGENCY)
Facility: HOSPITAL | Age: 74
LOS: 1 days | Discharge: HOME | End: 2024-07-29
Admitting: EMERGENCY MEDICINE
Payer: MEDICARE

## 2024-07-29 VITALS
TEMPERATURE: 98 F | HEART RATE: 99 BPM | RESPIRATION RATE: 18 BRPM | DIASTOLIC BLOOD PRESSURE: 87 MMHG | SYSTOLIC BLOOD PRESSURE: 123 MMHG | OXYGEN SATURATION: 96 % | WEIGHT: 149.91 LBS

## 2024-07-29 VITALS
SYSTOLIC BLOOD PRESSURE: 126 MMHG | OXYGEN SATURATION: 98 % | RESPIRATION RATE: 18 BRPM | DIASTOLIC BLOOD PRESSURE: 78 MMHG | HEART RATE: 80 BPM

## 2024-07-29 DIAGNOSIS — L02.512 CUTANEOUS ABSCESS OF LEFT HAND: ICD-10-CM

## 2024-07-29 DIAGNOSIS — M79.645 PAIN IN LEFT FINGER(S): ICD-10-CM

## 2024-07-29 DIAGNOSIS — Z88.5 ALLERGY STATUS TO NARCOTIC AGENT: ICD-10-CM

## 2024-07-29 DIAGNOSIS — E11.9 TYPE 2 DIABETES MELLITUS WITHOUT COMPLICATIONS: ICD-10-CM

## 2024-07-29 LAB
ALBUMIN SERPL ELPH-MCNC: 2.9 G/DL — LOW (ref 3.4–5)
ALP SERPL-CCNC: 114 U/L — SIGNIFICANT CHANGE UP (ref 40–120)
ALT FLD-CCNC: 19 U/L — SIGNIFICANT CHANGE UP (ref 12–42)
ANION GAP SERPL CALC-SCNC: 11 MMOL/L — SIGNIFICANT CHANGE UP (ref 9–16)
AST SERPL-CCNC: 28 U/L — SIGNIFICANT CHANGE UP (ref 15–37)
BASOPHILS # BLD AUTO: 0.05 K/UL — SIGNIFICANT CHANGE UP (ref 0–0.2)
BASOPHILS NFR BLD AUTO: 0.5 % — SIGNIFICANT CHANGE UP (ref 0–2)
BILIRUB SERPL-MCNC: 0.5 MG/DL — SIGNIFICANT CHANGE UP (ref 0.2–1.2)
BUN SERPL-MCNC: 9 MG/DL — SIGNIFICANT CHANGE UP (ref 7–23)
CALCIUM SERPL-MCNC: 11.2 MG/DL — HIGH (ref 8.5–10.5)
CHLORIDE SERPL-SCNC: 108 MMOL/L — SIGNIFICANT CHANGE UP (ref 96–108)
CO2 SERPL-SCNC: 26 MMOL/L — SIGNIFICANT CHANGE UP (ref 22–31)
CREAT SERPL-MCNC: 0.98 MG/DL — SIGNIFICANT CHANGE UP (ref 0.5–1.3)
EGFR: 60 ML/MIN/1.73M2 — SIGNIFICANT CHANGE UP
EOSINOPHIL # BLD AUTO: 0.11 K/UL — SIGNIFICANT CHANGE UP (ref 0–0.5)
EOSINOPHIL NFR BLD AUTO: 1 % — SIGNIFICANT CHANGE UP (ref 0–6)
GLUCOSE SERPL-MCNC: 206 MG/DL — HIGH (ref 70–99)
HCT VFR BLD CALC: 43.8 % — SIGNIFICANT CHANGE UP (ref 34.5–45)
HGB BLD-MCNC: 14 G/DL — SIGNIFICANT CHANGE UP (ref 11.5–15.5)
IMM GRANULOCYTES NFR BLD AUTO: 0.4 % — SIGNIFICANT CHANGE UP (ref 0–0.9)
LYMPHOCYTES # BLD AUTO: 1.58 K/UL — SIGNIFICANT CHANGE UP (ref 1–3.3)
LYMPHOCYTES # BLD AUTO: 14.9 % — SIGNIFICANT CHANGE UP (ref 13–44)
MCHC RBC-ENTMCNC: 31.6 PG — SIGNIFICANT CHANGE UP (ref 27–34)
MCHC RBC-ENTMCNC: 32 GM/DL — SIGNIFICANT CHANGE UP (ref 32–36)
MCV RBC AUTO: 98.9 FL — SIGNIFICANT CHANGE UP (ref 80–100)
MONOCYTES # BLD AUTO: 0.46 K/UL — SIGNIFICANT CHANGE UP (ref 0–0.9)
MONOCYTES NFR BLD AUTO: 4.3 % — SIGNIFICANT CHANGE UP (ref 2–14)
NEUTROPHILS # BLD AUTO: 8.34 K/UL — HIGH (ref 1.8–7.4)
NEUTROPHILS NFR BLD AUTO: 78.9 % — HIGH (ref 43–77)
NRBC # BLD: 0 /100 WBCS — SIGNIFICANT CHANGE UP (ref 0–0)
PLATELET # BLD AUTO: 186 K/UL — SIGNIFICANT CHANGE UP (ref 150–400)
POTASSIUM SERPL-MCNC: 4.3 MMOL/L — SIGNIFICANT CHANGE UP (ref 3.5–5.3)
POTASSIUM SERPL-SCNC: 4.3 MMOL/L — SIGNIFICANT CHANGE UP (ref 3.5–5.3)
PROT SERPL-MCNC: 6.6 G/DL — SIGNIFICANT CHANGE UP (ref 6.4–8.2)
RBC # BLD: 4.43 M/UL — SIGNIFICANT CHANGE UP (ref 3.8–5.2)
RBC # FLD: 13.5 % — SIGNIFICANT CHANGE UP (ref 10.3–14.5)
SODIUM SERPL-SCNC: 145 MMOL/L — SIGNIFICANT CHANGE UP (ref 132–145)
WBC # BLD: 10.58 K/UL — HIGH (ref 3.8–10.5)
WBC # FLD AUTO: 10.58 K/UL — HIGH (ref 3.8–10.5)

## 2024-07-29 PROCEDURE — 73140 X-RAY EXAM OF FINGER(S): CPT | Mod: 26,LT

## 2024-07-29 PROCEDURE — 10061 I&D ABSCESS COMP/MULTIPLE: CPT

## 2024-07-29 PROCEDURE — 99285 EMERGENCY DEPT VISIT HI MDM: CPT | Mod: 25

## 2024-07-29 RX ORDER — AMOXICILLIN/POTASSIUM CLAV 250-125 MG
875 TABLET ORAL
Qty: 20 | Refills: 0
Start: 2024-07-29 | End: 2024-08-07

## 2024-07-29 RX ORDER — VANCOMYCIN HYDROCHLORIDE 50 MG/ML
1000 KIT ORAL ONCE
Refills: 0 | Status: COMPLETED | OUTPATIENT
Start: 2024-07-29 | End: 2024-07-29

## 2024-07-29 RX ORDER — AMPICILLIN AND SULBACTAM 2; 1 G/20ML; G/20ML
3 INJECTION, POWDER, FOR SOLUTION INTRAMUSCULAR; INTRAVENOUS ONCE
Refills: 0 | Status: COMPLETED | OUTPATIENT
Start: 2024-07-29 | End: 2024-07-29

## 2024-07-29 RX ORDER — SULFAMETHOXAZOLE AND TRIMETHOPRIM 800; 160 MG/1; MG/1
1 TABLET ORAL
Qty: 20 | Refills: 0
Start: 2024-07-29 | End: 2024-08-07

## 2024-07-29 RX ADMIN — VANCOMYCIN HYDROCHLORIDE 1000 MILLIGRAM(S): KIT at 16:38

## 2024-07-29 RX ADMIN — VANCOMYCIN HYDROCHLORIDE 250 MILLIGRAM(S): KIT at 15:19

## 2024-07-29 RX ADMIN — AMPICILLIN AND SULBACTAM 3 GRAM(S): 2; 1 INJECTION, POWDER, FOR SOLUTION INTRAMUSCULAR; INTRAVENOUS at 14:30

## 2024-07-29 RX ADMIN — AMPICILLIN AND SULBACTAM 200 GRAM(S): 2; 1 INJECTION, POWDER, FOR SOLUTION INTRAMUSCULAR; INTRAVENOUS at 13:53

## 2024-07-29 NOTE — ED ADULT NURSE NOTE - ED STAT RN HANDOFF DETAILS
report given to [facility] transport. pt a&ox4 in NAD. VSS pt verbalized understanding of abx prescriptions for home.

## 2024-07-29 NOTE — ED PROVIDER NOTE - CLINICAL SUMMARY MEDICAL DECISION MAKING FREE TEXT BOX
Patient presents today with an isolated abscess to her left finger.  The abscess protrudes superficially from the skin with visible purulence through a thin layer of skin, there is no  suspicion for tenosynovitis as the patient's digit is generally unaffected aside from this 1 localized area.  Labs show only mild leukocytosis, x-ray shows no signs of osteo, given patient's history of diabetes and immunosuppression, will give first dose of antibiotics IV in an abundance of caution.  Will discharge on Augmentin and Bactrim.  Patient verbalizes understanding as does home health aide that she needs to have a wound check either here or by her primary doctor and  1 to 2 days.

## 2024-07-29 NOTE — ED PROVIDER NOTE - NSFOLLOWUPINSTRUCTIONS_ED_ALL_ED_FT
Make sure that you have a wound check either in this ER with your primary care doctor in 1 to 2 days to make sure the area is healing properly.  Please take antibiotics as prescribed, both antibiotics twice daily for 7 to 10 days.  If symptoms are completely resolved after 7 days it is okay to discontinue but if you continue to have any symptoms you should continue antibiotics for 10 days.  If you develop any swelling of the rest of the finger, pain with moving the finger, streaking up the hand of redness or any other new or concerning symptoms including fever chills or any other concerns please return to the ER immediately.  Keep the hand elevated as discussed.    Cellulitis    WHAT YOU NEED TO KNOW:    Cellulitis is a skin infection caused by bacteria. Cellulitis may go away on its own or you may need treatment. Your healthcare provider may draw a [place] around the outside edges of your cellulitis. If your cellulitis spreads, your healthcare provider will see it outside of the [place]. Cellulitis          DISCHARGE INSTRUCTIONS:    Call 911 if:     You have sudden trouble breathing or chest pain.        Return to the emergency department if:     Your wound gets larger and more painful.       You feel a crackling under your skin when you touch it.      You have purple dots or bumps on your skin, or you see bleeding under your skin.      You have new swelling and pain in your legs.      The red, warm, swollen area gets larger.      You see red streaks coming from the infected area.    Contact your healthcare provider if:     You have a fever.      Your fever or pain does not go away or gets worse.      The area does not get smaller after 2 days of antibiotics.      Your skin is flaking or peeling off.      You have questions or concerns about your condition or care.    Medicines:     Antibiotics help treat the bacterial infection.       NSAIDs, such as ibuprofen, help decrease swelling, pain, and fever. NSAIDs can cause stomach bleeding or kidney problems in certain people. If you take blood thinner medicine, always ask if NSAIDs are safe for you. Always read the medicine label and follow directions. Do not give these medicines to children under 6 months of age without direction from your child's healthcare provider.      Acetaminophen decreases pain and fever. It is available without a doctor's order. Ask how much to take and how often to take it. Follow directions. Read the labels of all other medicines you are using to see if they also contain acetaminophen, or ask your doctor or pharmacist. Acetaminophen can cause liver damage if not taken correctly. Do not use more than 4 grams (4,000 milligrams) total of acetaminophen in one day.       Take your medicine as directed. Contact your healthcare provider if you think your medicine is not helping or if you have side effects. Tell him or her if you are allergic to any medicine. Keep a list of the medicines, vitamins, and herbs you take. Include the amounts, and when and why you take them. Bring the list or the pill bottles to follow-up visits. Carry your medicine list with you in case of an emergency.    Self-care:     Elevate the area above the level of your heart as often as you can. This will help decrease swelling and pain. Prop the area on pillows or blankets to keep it elevated comfortably.       Clean the area daily until the wound scabs over. Gently wash the area with soap and water. Pat dry. Use dressings as directed.       Place cool or warm, wet cloths on the area as directed. Use clean cloths and clean water. Leave it on the area until the cloth is room temperature. Pat the area dry with a clean, dry cloth. The cloths may help decrease pain.     Prevent cellulitis:     Do not scratch bug bites or areas of injury. You increase your risk for cellulitis by scratching these areas.       Do not share personal items, such as towels, clothing, and razors.       Clean exercise equipment with germ-killing  before and after you use it.      Wash your hands often. Use soap and water. Wash your hands after you use the bathroom, change a child's diapers, or sneeze. Wash your hands before you prepare or eat food. Use lotion to prevent dry, cracked skin. Handwashing           Wear pressure stockings as directed. You may be told to wear the stockings if you have peripheral edema. The stockings improve blood flow and decrease swelling.      Treat athlete’s foot. This can help prevent the spread of a bacterial skin infection.    Follow up with your healthcare provider within 3 days, or as directed: Your healthcare provider will check if your cellulitis is getting better. You may need different medicine. Write down your questions so you remember to ask them during your visits.

## 2024-07-29 NOTE — ED PROVIDER NOTE - PATIENT PORTAL LINK FT
You can access the FollowMyHealth Patient Portal offered by [facility] by registering at the following website: [email or web address]. By joining [employer]’s FollowMyHealth portal, you will also be able to view your health information using other applications (apps) compatible with our system.

## 2024-07-29 NOTE — ED PROVIDER NOTE - SKIN, MLM
Skin normal color for race, warm, dry and intact.  approximately 1 cm swollen a pustule with fluctuance and small amount of surrounding erythema to proximal dorsal phalanx of the left fourth digit, very superficial with purulence visible through the skin, no tracking or sausage digit, normal range of motion of the digit but with mild discomfort of flexion at the proximal IP joint

## 2024-07-29 NOTE — ED ADULT NURSE NOTE - NSFALLUNIVINTERV_ED_ALL_ED
Bed/Stretcher in lowest position, wheels locked, appropriate side rails in place/Call bell, personal items and telephone in reach/Instruct patient to call for assistance before getting out of bed/chair/stretcher/Non-slip footwear applied when patient is off stretcher/[place] to call system/Physically safe environment - no spills, clutter or unnecessary equipment/Purposeful proactive rounding/Room/bathroom lighting operational, light cord in reach

## 2024-07-29 NOTE — ED ADULT NURSE NOTE - OBJECTIVE STATEMENT
reports abscess to left 2nd digit worsening x 1 week. reports pain. denies discharge, fevers. unsure of how it developed.

## 2024-07-31 LAB
-  CLINDAMYCIN: SIGNIFICANT CHANGE UP
-  ERYTHROMYCIN: SIGNIFICANT CHANGE UP
-  GENTAMICIN: SIGNIFICANT CHANGE UP
-  LINEZOLID: SIGNIFICANT CHANGE UP
-  OXACILLIN: SIGNIFICANT CHANGE UP
-  PENICILLIN: SIGNIFICANT CHANGE UP
-  RIFAMPIN: SIGNIFICANT CHANGE UP
-  TETRACYCLINE: SIGNIFICANT CHANGE UP
-  TRIMETHOPRIM/SULFAMETHOXAZOLE: SIGNIFICANT CHANGE UP
-  VANCOMYCIN: SIGNIFICANT CHANGE UP
METHOD TYPE: SIGNIFICANT CHANGE UP

## 2024-08-05 LAB
CULTURE RESULTS: ABNORMAL
ORGANISM # SPEC MICROSCOPIC CNT: ABNORMAL
ORGANISM # SPEC MICROSCOPIC CNT: SIGNIFICANT CHANGE UP
SPECIMEN SOURCE: SIGNIFICANT CHANGE UP

## 2024-10-09 VITALS
TEMPERATURE: 98 F | SYSTOLIC BLOOD PRESSURE: 117 MMHG | DIASTOLIC BLOOD PRESSURE: 90 MMHG | OXYGEN SATURATION: 98 % | HEART RATE: 84 BPM | WEIGHT: 242.51 LBS | HEIGHT: 66 IN | RESPIRATION RATE: 15 BRPM

## 2024-10-09 LAB
ALBUMIN SERPL ELPH-MCNC: 3 G/DL — LOW (ref 3.4–5)
ALP SERPL-CCNC: 117 U/L — SIGNIFICANT CHANGE UP (ref 40–120)
ALT FLD-CCNC: 36 U/L — SIGNIFICANT CHANGE UP (ref 12–42)
AMYLASE P1 CFR SERPL: 69 U/L — SIGNIFICANT CHANGE UP (ref 25–115)
ANION GAP SERPL CALC-SCNC: 11 MMOL/L — SIGNIFICANT CHANGE UP (ref 9–16)
APPEARANCE UR: ABNORMAL
APTT BLD: 35.6 SEC — SIGNIFICANT CHANGE UP (ref 24.5–35.6)
AST SERPL-CCNC: 36 U/L — SIGNIFICANT CHANGE UP (ref 15–37)
BACTERIA # UR AUTO: ABNORMAL /HPF
BILIRUB SERPL-MCNC: 0.6 MG/DL — SIGNIFICANT CHANGE UP (ref 0.2–1.2)
BILIRUB UR-MCNC: NEGATIVE — SIGNIFICANT CHANGE UP
BUN SERPL-MCNC: 11 MG/DL — SIGNIFICANT CHANGE UP (ref 7–23)
CALCIUM SERPL-MCNC: 10.9 MG/DL — HIGH (ref 8.5–10.5)
CHLORIDE SERPL-SCNC: 106 MMOL/L — SIGNIFICANT CHANGE UP (ref 96–108)
CK MB CFR SERPL CALC: <0.5 NG/ML — LOW (ref 0.5–3.6)
CK SERPL-CCNC: 20 U/L — LOW (ref 26–192)
CO2 SERPL-SCNC: 25 MMOL/L — SIGNIFICANT CHANGE UP (ref 22–31)
COLOR SPEC: YELLOW — SIGNIFICANT CHANGE UP
CREAT SERPL-MCNC: 0.95 MG/DL — SIGNIFICANT CHANGE UP (ref 0.5–1.3)
DIFF PNL FLD: ABNORMAL
EGFR: 62 ML/MIN/1.73M2 — SIGNIFICANT CHANGE UP
EPI CELLS # UR: PRESENT
GLUCOSE SERPL-MCNC: 193 MG/DL — HIGH (ref 70–99)
GLUCOSE UR QL: NEGATIVE MG/DL — SIGNIFICANT CHANGE UP
HCT VFR BLD CALC: 44.8 % — SIGNIFICANT CHANGE UP (ref 34.5–45)
HGB BLD-MCNC: 14 G/DL — SIGNIFICANT CHANGE UP (ref 11.5–15.5)
INR BLD: 1.99 — HIGH (ref 0.85–1.16)
KETONES UR-MCNC: NEGATIVE MG/DL — SIGNIFICANT CHANGE UP
LEUKOCYTE ESTERASE UR-ACNC: ABNORMAL
LIDOCAIN IGE QN: 23 U/L — SIGNIFICANT CHANGE UP (ref 16–77)
MCHC RBC-ENTMCNC: 30.8 PG — SIGNIFICANT CHANGE UP (ref 27–34)
MCHC RBC-ENTMCNC: 31.3 GM/DL — LOW (ref 32–36)
MCV RBC AUTO: 98.5 FL — SIGNIFICANT CHANGE UP (ref 80–100)
NITRITE UR-MCNC: POSITIVE
NRBC # BLD: 0 /100 WBCS — SIGNIFICANT CHANGE UP (ref 0–0)
NT-PROBNP SERPL-SCNC: 699 PG/ML — HIGH
PH UR: 5.5 — SIGNIFICANT CHANGE UP (ref 5–8)
PLATELET # BLD AUTO: 241 K/UL — SIGNIFICANT CHANGE UP (ref 150–400)
POTASSIUM SERPL-MCNC: 4.5 MMOL/L — SIGNIFICANT CHANGE UP (ref 3.5–5.3)
POTASSIUM SERPL-SCNC: 4.5 MMOL/L — SIGNIFICANT CHANGE UP (ref 3.5–5.3)
PROT SERPL-MCNC: 7.1 G/DL — SIGNIFICANT CHANGE UP (ref 6.4–8.2)
PROT UR-MCNC: 100 MG/DL
PROTHROM AB SERPL-ACNC: 23.1 SEC — HIGH (ref 9.9–13.4)
RBC # BLD: 4.55 M/UL — SIGNIFICANT CHANGE UP (ref 3.8–5.2)
RBC # FLD: 13.4 % — SIGNIFICANT CHANGE UP (ref 10.3–14.5)
RBC CASTS # UR COMP ASSIST: 60 /HPF — HIGH (ref 0–4)
SODIUM SERPL-SCNC: 142 MMOL/L — SIGNIFICANT CHANGE UP (ref 132–145)
SP GR SPEC: 1.02 — SIGNIFICANT CHANGE UP (ref 1–1.03)
TROPONIN I, HIGH SENSITIVITY RESULT: 11.5 NG/L — SIGNIFICANT CHANGE UP
UROBILINOGEN FLD QL: 0.2 MG/DL — SIGNIFICANT CHANGE UP (ref 0.2–1)
WBC # BLD: 15.91 K/UL — HIGH (ref 3.8–10.5)
WBC # FLD AUTO: 15.91 K/UL — HIGH (ref 3.8–10.5)
WBC UR QL: 60 /HPF — HIGH (ref 0–5)

## 2024-10-09 PROCEDURE — 74176 CT ABD & PELVIS W/O CONTRAST: CPT | Mod: 26,MC

## 2024-10-09 PROCEDURE — 99285 EMERGENCY DEPT VISIT HI MDM: CPT

## 2024-10-09 PROCEDURE — 71250 CT THORAX DX C-: CPT | Mod: 26,MC

## 2024-10-09 RX ORDER — SODIUM CHLORIDE 0.9 % (FLUSH) 0.9 %
500 SYRINGE (ML) INJECTION ONCE
Refills: 0 | Status: COMPLETED | OUTPATIENT
Start: 2024-10-09 | End: 2024-10-09

## 2024-10-09 RX ORDER — CEFTRIAXONE SODIUM 1 G
1000 VIAL (EA) INJECTION ONCE
Refills: 0 | Status: COMPLETED | OUTPATIENT
Start: 2024-10-09 | End: 2024-10-09

## 2024-10-09 RX ADMIN — Medication 500 MILLILITER(S): at 23:56

## 2024-10-09 RX ADMIN — Medication 100 MILLIGRAM(S): at 23:54

## 2024-10-09 NOTE — ED ADULT TRIAGE NOTE - CHIEF COMPLAINT QUOTE
patient here with abd pain, nausea since yesterday; also complaining of dark stools but no BRB; patient appears pale in triage

## 2024-10-09 NOTE — ED ADULT NURSE NOTE - NSFALLHARMRISKINTERV_ED_ALL_ED

## 2024-10-09 NOTE — ED ADULT NURSE NOTE - PAIN RATING/NUMBER SCALE (0-10): ACTIVITY
Edna Dodge sent Rx request for the following:      Requested Prescriptions   Pending Prescriptions Disp Refills    gabapentin (NEURONTIN) 300 MG capsule [Pharmacy Med Name: GABAPENTIN 300MG CAPSULES] 180 capsule 11     Sig: TAKE 1 CAPSULE(300 MG) BY MOUTH TWICE DAILY       There is no refill protocol information for this order          Last Prescription Date:   9/29/22  Last Fill Qty/Refills:         180, R-11    Last Office Visit:              12/22/22   Future Office visit:           None scheduled    Patient is due for annual medicare wellness visit and labwork.    Skye Angela RN on 11/6/2023 at 1:57 PM          5 (moderate pain)

## 2024-10-09 NOTE — ED PROVIDER NOTE - CLINICAL SUMMARY MEDICAL DECISION MAKING FREE TEXT BOX
Dysuria, abd pain, nausea x yesterday.    Pt with DM, and afib on DOAC.     CBC CMP UA UCX sent, imaging with CT ordered Dysuria, abd pain, nausea x yesterday.    Pt with DM, and afib on DOAC.     CBC CMP UA UCX sent, imaging with CT ordered    12:21am case d/w Dr Tobias medicine atttending and will admit to regional medicine with contact isolation for ESBL in past and recommends Meropenem

## 2024-10-09 NOTE — ED ADULT NURSE NOTE - CHIEF COMPLAINT
Received Refill PA request via [employer]  for NURTEC 75 MG TABLET DISPERSIBLE. (Quantity:8, Day Supply:30)     Insurance: [employer]  Member ID:  [id number]  BIN: [id number]  PCN: [facility]  Group: [id number]     Ran Test claim via [employer] medication Pays for a $0.00 copay. Will outreach to patient to offer specialty pharmacy services and or release to preferred pharmacy. [employer] has applied paid $40.00 toward your copay voucher to reduce the patients copay. Mention sponsorship to patient// PA ON FILE UNTIL 07/09/2024      
The patient is a 74y Female complaining of abdominal pain.

## 2024-10-09 NOTE — ED ADULT NURSE NOTE - OBJECTIVE STATEMENT
Coming from home, accompanied by HAA c/o abd pain and diarrhea x1 week, reports dark stools, no vomiting. Pt appears pale in ED. Pt on eliquis.

## 2024-10-09 NOTE — ED PROVIDER NOTE - NSFOLLOWUPINSTRUCTIONS_ED_ALL_ED_FT
Urinary Tract Infection in Older Adults    WHAT YOU NEED TO KNOW:    What is a urinary tract infection (UTI)? A UTI is caused by bacteria that get inside your urinary tract. Your urinary tract includes your kidneys, ureters, bladder, and urethra. A UTI is more common in your lower urinary tract, which includes your bladder and urethra.  Kidney, Ureters, Bladder    What increases my risk for a UTI?    A UTI within the last 3 months    Menopause in women    Enlarged prostate in men    Medicines that affect urination    Urinary incontinence (you cannot control your bladder)    Sexual intercourse    Medical conditions, such as diabetes or obesity    Urinary tract problems, such as a narrowing, kidney stones, or inability to empty your bladder completely  What are the signs and symptoms of a UTI?    Fever and chills    Pain or burning when you urinate    Urine that smells bad or looks cloudy, or blood in your urine    Urinating more often or waking from sleep to urinate    Sudden, strong need to urinate    Pain or pressure in your lower abdomen    Leaking urine    Confusion or agitation    Fatigue, shakiness, and weakness  How is a UTI diagnosed? Your healthcare provider will ask about your symptoms. He or she may also examine you. You may need any of the following:    A urinalysis will give information about your urinary tract and overall health.    A urine culture may show the type of germ causing the infection. You may need this test again if you continue to have signs and symptoms after a UTI is treated.  How is a UTI treated? Medicines treat the bacterial infection or decrease pain and burning when you urinate. You may also need medicines to decrease the urge to urinate often. If you have UTIs often (called recurrent UTIs), you may be given antibiotics to take regularly. You will be given directions for when and how to use antibiotics. The goal is to prevent UTIs but not cause antibiotic resistance by using antibiotics too often.    How can I manage my symptoms?    Drink liquids as directed. Liquids can help flush bacteria from your urinary tract. Ask how much liquid to drink each day and which liquids are best for you. You may need to drink more liquids than usual to help flush out the bacteria. Do not drink alcohol, caffeine, and citrus juices. These can irritate your bladder and increase your symptoms.    Apply heat on your abdomen for 20 to 30 minutes every 2 hours for as many days as directed. Heat helps decrease discomfort and pressure in your bladder.  How can I help prevent a UTI?    Urinate when you feel the urge. Do not hold your urine. Bacteria can grow if urine stays in the bladder too long. It may be helpful to urinate at least every 3 to 4 hours.    Urinate after you have sex. This will help flush away bacteria that can enter your urinary tract during sex.    Wear cotton underwear and clothes that are loose. Tight pants and nylon underwear can trap moisture and cause bacteria to grow.    Drink cranberry juice or take cranberry supplements. These may help prevent UTIs. Your healthcare provider can recommend the right juice or supplement for you.    Women should wipe front to back after urinating or having a bowel movement. This may prevent germs from getting into the urinary tract. Do not douche or use feminine deodorants. These can change the chemical balance in your vagina. You may also be given vaginal estrogen medicine. This medicine helps prevent recurrent UTIs in women who have gone through menopause or are in kunal-menopause.  When should I seek immediate care?    You become confused or agitated.    You fall down.    You are urinating very little or not at all.    You are vomiting.    You have a high fever with shaking chills.    You have side or back pain that gets worse.  When should I call my doctor?    You have a fever.    You are a woman and you have increased white or yellow discharge from your vagina.    You do not feel better after 2 days of taking antibiotics.    You have questions or concerns about your condition or care.

## 2024-10-10 ENCOUNTER — INPATIENT (INPATIENT)
Facility: HOSPITAL | Age: 74
LOS: 3 days | Discharge: HOME HEALTH | DRG: 871 | End: 2024-10-14
Attending: INTERNAL MEDICINE | Admitting: STUDENT IN AN ORGANIZED HEALTH CARE EDUCATION/TRAINING PROGRAM
Payer: MEDICARE

## 2024-10-10 DIAGNOSIS — N39.0 URINARY TRACT INFECTION, SITE NOT SPECIFIED: ICD-10-CM

## 2024-10-10 DIAGNOSIS — E78.5 HYPERLIPIDEMIA, UNSPECIFIED: ICD-10-CM

## 2024-10-10 DIAGNOSIS — Z29.9 ENCOUNTER FOR PROPHYLACTIC MEASURES, UNSPECIFIED: ICD-10-CM

## 2024-10-10 DIAGNOSIS — E83.52 HYPERCALCEMIA: ICD-10-CM

## 2024-10-10 DIAGNOSIS — K21.9 GASTRO-ESOPHAGEAL REFLUX DISEASE WITHOUT ESOPHAGITIS: ICD-10-CM

## 2024-10-10 DIAGNOSIS — R19.7 DIARRHEA, UNSPECIFIED: ICD-10-CM

## 2024-10-10 DIAGNOSIS — I48.20 CHRONIC ATRIAL FIBRILLATION, UNSPECIFIED: ICD-10-CM

## 2024-10-10 DIAGNOSIS — E11.9 TYPE 2 DIABETES MELLITUS WITHOUT COMPLICATIONS: ICD-10-CM

## 2024-10-10 DIAGNOSIS — M10.9 GOUT, UNSPECIFIED: ICD-10-CM

## 2024-10-10 DIAGNOSIS — A41.9 SEPSIS, UNSPECIFIED ORGANISM: ICD-10-CM

## 2024-10-10 LAB
ANION GAP SERPL CALC-SCNC: 6 MMOL/L — SIGNIFICANT CHANGE UP (ref 5–17)
ANION GAP SERPL CALC-SCNC: 9 MMOL/L — SIGNIFICANT CHANGE UP (ref 5–17)
BUN SERPL-MCNC: 10 MG/DL — SIGNIFICANT CHANGE UP (ref 7–23)
BUN SERPL-MCNC: 11 MG/DL — SIGNIFICANT CHANGE UP (ref 7–23)
CALCIUM SERPL-MCNC: 10.2 MG/DL — SIGNIFICANT CHANGE UP (ref 8.4–10.5)
CALCIUM SERPL-MCNC: 10.3 MG/DL — SIGNIFICANT CHANGE UP (ref 8.4–10.5)
CHLORIDE SERPL-SCNC: 109 MMOL/L — HIGH (ref 96–108)
CHLORIDE SERPL-SCNC: 110 MMOL/L — HIGH (ref 96–108)
CO2 SERPL-SCNC: 21 MMOL/L — LOW (ref 22–31)
CO2 SERPL-SCNC: 25 MMOL/L — SIGNIFICANT CHANGE UP (ref 22–31)
CREAT SERPL-MCNC: 0.64 MG/DL — SIGNIFICANT CHANGE UP (ref 0.5–1.3)
CREAT SERPL-MCNC: 0.71 MG/DL — SIGNIFICANT CHANGE UP (ref 0.5–1.3)
EGFR: 88 ML/MIN/1.73M2 — SIGNIFICANT CHANGE UP
EGFR: 92 ML/MIN/1.73M2 — SIGNIFICANT CHANGE UP
GLUCOSE BLDC GLUCOMTR-MCNC: 180 MG/DL — HIGH (ref 70–99)
GLUCOSE BLDC GLUCOMTR-MCNC: 191 MG/DL — HIGH (ref 70–99)
GLUCOSE SERPL-MCNC: 128 MG/DL — HIGH (ref 70–99)
GLUCOSE SERPL-MCNC: 191 MG/DL — HIGH (ref 70–99)
HCT VFR BLD CALC: 38.8 % — SIGNIFICANT CHANGE UP (ref 34.5–45)
HGB BLD-MCNC: 12.1 G/DL — SIGNIFICANT CHANGE UP (ref 11.5–15.5)
LACTATE BLDV-MCNC: 1.8 MMOL/L — SIGNIFICANT CHANGE UP (ref 0.5–2)
LACTATE BLDV-MCNC: 2.9 MMOL/L — HIGH (ref 0.5–2)
MAGNESIUM SERPL-MCNC: 0.7 MG/DL — CRITICAL LOW (ref 1.6–2.6)
MAGNESIUM SERPL-MCNC: 2 MG/DL — SIGNIFICANT CHANGE UP (ref 1.6–2.6)
MCHC RBC-ENTMCNC: 30 PG — SIGNIFICANT CHANGE UP (ref 27–34)
MCHC RBC-ENTMCNC: 31.2 GM/DL — LOW (ref 32–36)
MCV RBC AUTO: 96 FL — SIGNIFICANT CHANGE UP (ref 80–100)
NRBC # BLD: 0 /100 WBCS — SIGNIFICANT CHANGE UP (ref 0–0)
PHOSPHATE SERPL-MCNC: 2 MG/DL — LOW (ref 2.5–4.5)
PLATELET # BLD AUTO: 200 K/UL — SIGNIFICANT CHANGE UP (ref 150–400)
POTASSIUM SERPL-MCNC: 3.5 MMOL/L — SIGNIFICANT CHANGE UP (ref 3.5–5.3)
POTASSIUM SERPL-MCNC: 3.8 MMOL/L — SIGNIFICANT CHANGE UP (ref 3.5–5.3)
POTASSIUM SERPL-SCNC: 3.5 MMOL/L — SIGNIFICANT CHANGE UP (ref 3.5–5.3)
POTASSIUM SERPL-SCNC: 3.8 MMOL/L — SIGNIFICANT CHANGE UP (ref 3.5–5.3)
RAPID RVP RESULT: SIGNIFICANT CHANGE UP
RBC # BLD: 4.04 M/UL — SIGNIFICANT CHANGE UP (ref 3.8–5.2)
RBC # FLD: 13.3 % — SIGNIFICANT CHANGE UP (ref 10.3–14.5)
SARS-COV-2 RNA SPEC QL NAA+PROBE: SIGNIFICANT CHANGE UP
SODIUM SERPL-SCNC: 139 MMOL/L — SIGNIFICANT CHANGE UP (ref 135–145)
SODIUM SERPL-SCNC: 141 MMOL/L — SIGNIFICANT CHANGE UP (ref 135–145)
WBC # BLD: 10.06 K/UL — SIGNIFICANT CHANGE UP (ref 3.8–10.5)
WBC # FLD AUTO: 10.06 K/UL — SIGNIFICANT CHANGE UP (ref 3.8–10.5)

## 2024-10-10 PROCEDURE — 99223 1ST HOSP IP/OBS HIGH 75: CPT

## 2024-10-10 RX ORDER — MEROPENEM 500 MG/20ML
1000 INJECTION INTRAVENOUS ONCE
Refills: 0 | Status: COMPLETED | OUTPATIENT
Start: 2024-10-10 | End: 2024-10-10

## 2024-10-10 RX ORDER — ATORVASTATIN CALCIUM 10 MG/1
80 TABLET, FILM COATED ORAL EVERY 24 HOURS
Refills: 0 | Status: DISCONTINUED | OUTPATIENT
Start: 2024-10-10 | End: 2024-10-14

## 2024-10-10 RX ORDER — MAGNESIUM SULFATE 500 MG/ML
4 VIAL (ML) INJECTION ONCE
Refills: 0 | Status: COMPLETED | OUTPATIENT
Start: 2024-10-10 | End: 2024-10-10

## 2024-10-10 RX ORDER — SODIUM CHLORIDE IRRIG SOLUTION 0.9 %
1000 SOLUTION, IRRIGATION IRRIGATION
Refills: 0 | Status: DISCONTINUED | OUTPATIENT
Start: 2024-10-10 | End: 2024-10-14

## 2024-10-10 RX ORDER — APIXABAN 5 MG/1
5 TABLET, FILM COATED ORAL EVERY 12 HOURS
Refills: 0 | Status: DISCONTINUED | OUTPATIENT
Start: 2024-10-10 | End: 2024-10-14

## 2024-10-10 RX ORDER — MEROPENEM 500 MG/20ML
1000 INJECTION INTRAVENOUS EVERY 8 HOURS
Refills: 0 | Status: DISCONTINUED | OUTPATIENT
Start: 2024-10-10 | End: 2024-10-10

## 2024-10-10 RX ORDER — ALCOHOL ANTISEPTIC PADS
25 PADS, MEDICATED (EA) TOPICAL ONCE
Refills: 0 | Status: DISCONTINUED | OUTPATIENT
Start: 2024-10-10 | End: 2024-10-14

## 2024-10-10 RX ORDER — ALCOHOL ANTISEPTIC PADS
12.5 PADS, MEDICATED (EA) TOPICAL ONCE
Refills: 0 | Status: DISCONTINUED | OUTPATIENT
Start: 2024-10-10 | End: 2024-10-14

## 2024-10-10 RX ORDER — METHENAMINE MANDELATE 500 MG/5ML
1 SUSPENSION, ORAL (FINAL DOSE FORM) ORAL
Refills: 0 | DISCHARGE

## 2024-10-10 RX ORDER — SODIUM CHLORIDE 0.9 % (FLUSH) 0.9 %
1000 SYRINGE (ML) INJECTION ONCE
Refills: 0 | Status: COMPLETED | OUTPATIENT
Start: 2024-10-10 | End: 2024-10-10

## 2024-10-10 RX ORDER — GLUCAGON INJECTION, SOLUTION 0.5 MG/.1ML
1 INJECTION, SOLUTION SUBCUTANEOUS ONCE
Refills: 0 | Status: DISCONTINUED | OUTPATIENT
Start: 2024-10-10 | End: 2024-10-14

## 2024-10-10 RX ORDER — METFORMIN HCL 500 MG
1 TABLET ORAL
Refills: 0 | DISCHARGE

## 2024-10-10 RX ORDER — MEROPENEM 500 MG/20ML
1000 INJECTION INTRAVENOUS EVERY 8 HOURS
Refills: 0 | Status: COMPLETED | OUTPATIENT
Start: 2024-10-10 | End: 2024-10-13

## 2024-10-10 RX ORDER — ACETAMINOPHEN 325 MG
650 TABLET ORAL EVERY 6 HOURS
Refills: 0 | Status: DISCONTINUED | OUTPATIENT
Start: 2024-10-10 | End: 2024-10-14

## 2024-10-10 RX ORDER — INSULIN LISPRO 100/ML
VIAL (ML) SUBCUTANEOUS
Refills: 0 | Status: DISCONTINUED | OUTPATIENT
Start: 2024-10-10 | End: 2024-10-14

## 2024-10-10 RX ADMIN — Medication 1: at 22:42

## 2024-10-10 RX ADMIN — MEROPENEM 100 MILLIGRAM(S): 500 INJECTION INTRAVENOUS at 18:00

## 2024-10-10 RX ADMIN — Medication 1000 MILLILITER(S): at 02:04

## 2024-10-10 RX ADMIN — MEROPENEM 100 MILLIGRAM(S): 500 INJECTION INTRAVENOUS at 01:01

## 2024-10-10 RX ADMIN — MEROPENEM 100 MILLIGRAM(S): 500 INJECTION INTRAVENOUS at 08:40

## 2024-10-10 RX ADMIN — Medication 25 GRAM(S): at 16:33

## 2024-10-10 RX ADMIN — ATORVASTATIN CALCIUM 80 MILLIGRAM(S): 10 TABLET, FILM COATED ORAL at 18:01

## 2024-10-10 RX ADMIN — Medication 300 MILLIGRAM(S): at 18:00

## 2024-10-10 RX ADMIN — Medication 100 MILLILITER(S): at 18:00

## 2024-10-10 RX ADMIN — APIXABAN 5 MILLIGRAM(S): 5 TABLET, FILM COATED ORAL at 18:01

## 2024-10-10 RX ADMIN — Medication 1: at 18:22

## 2024-10-10 NOTE — H&P ADULT - PROBLEM SELECTOR PLAN 4
Pt with history of Afib on Eliquis 5mg BID. Pt is not reporting hematuria or bloody stools, no other signs of acute bleeding    Plan:  - continue eliquis 5mg BID Pt's calcium correction for albumin is 11.2. Prior admission pt had hypercalcium with mildly elevated PTH c/f primary hyperparathyroidism.     Plan:  - r/p CBC, BMP  - f/u PTH  - check Mg and Phos

## 2024-10-10 NOTE — H&P ADULT - ASSESSMENT
74F w/ PMH of Afib on eliquis, HLD, DM2, GERD, gout, and hx of recurrent UTIs recently treated for ESBL+ UTI w/ ertapenem (5/2024), who presents to the hospital with 2-3 days of dysuria, fevers/chills, diarrhea, increased confusion found to meet severe sepsis, admitted to [facility] for management of severe sepsis iso UTI and diarrhea.   74F w/ PMH of Afib on Eliquis, HLD, DM2, GERD, gout, and hx of recurrent UTIs recently treated for ESBL+ UTI w/ ertapenem (5/2024), who presents to the hospital with 2-3 days of dysuria, fevers/chills, diarrhea, increased confusion found to meet severe sepsis, admitted to [email or web address] for management of severe sepsis iso UTI and diarrhea.

## 2024-10-10 NOTE — H&P ADULT - NSICDXPASTMEDICALHX_GEN_ALL_CORE_FT
PAST MEDICAL HISTORY:  Atrial fibrillation     Diabetes     General weakness     GERD (gastroesophageal reflux disease)     Gout     Hyperlipidemia

## 2024-10-10 NOTE — H&P ADULT - PROBLEM SELECTOR PLAN 3
Pt reporting 2-3 days of 3-4x/day soft stools w/ LLQ abdominal pain and recent antibx use    Plan:  - f/u GI PCR and Cdiff Pt reporting 2-3 days of 3-4x/day soft stools w/ LLQ abdominal pain and recent antibx use and possible URI based on self-reported symptoms     Plan:  - f/u GI PCR and Cdiff  - f/u RVP  - monitor stool counts

## 2024-10-10 NOTE — H&P ADULT - PROBLEM SELECTOR PLAN 6
Continue home atorvastatin 80mg QD Pt on home Metformin 500mg BID and Januvia 100mg QD    Plan:  - obtain A1c with AM labs  - iSS

## 2024-10-10 NOTE — H&P ADULT - PROBLEM SELECTOR PLAN 9
F: s/p 1.5L NS in ED  E: replete for K<4 and Mg<2  N: regular diet  V: eliquis 5mg BID Continue home allopurinol 300mg QD

## 2024-10-10 NOTE — H&P ADULT - PROBLEM SELECTOR PLAN 2
Pt with history of recurrent UTIs, most recently treated with Macrobid 9/13-17 and started on methenamine for UTI ppx. Pt has +ESBL urine cultures on Jan 2023 and May 2024, was treated with ertapenem on prior May 2024 admission.     Plan:  - continue meropenem 1g q8 for 7 days iso of prior history of ESBL UTI  - f/u urine cultures and sensitivities Pt with history of recurrent UTIs, most recently treated with Macrobid 9/13-17 and started on methenamine for UTI ppx. Pt has +ESBL urine cultures on Jan 2023 and May 2024, was treated with ertapenem on prior May 2024 admission.     Plan:  - continue meropenem 1g q8 for 7 days iso of prior history of ESBL UTI  - f/u urine cultures and sensitivities  - check bladder scans to r/o retention as cause of recurrent UTIs

## 2024-10-10 NOTE — H&P ADULT - PROBLEM SELECTOR PLAN 1
Pt presenting with 2-3 fevers/chills at home, WBC 15.91, lactate 2.8, severe sepsis likely 2/2 UTI and diarrhea. Lactate decrease to 1.8 after receiving 1.5L NS bolus.    Plan:  - obtain blood cultures   - continue meropenem 1g q8 for 7 days  - f/u urine cultures and sensitivities  - monitor vitals  - GI PCR and C. diff for diarrhea Pt presenting with 2-3 fevers/chills at home, WBC 15.91, lactate 2.8, severe sepsis likely 2/2 UTI and diarrhea. Lactate decrease to 1.8 after receiving 1.5L NS bolus.    Plan:  - obtain blood cultures   - continue meropenem 1g q8 for 7 days  - f/u urine cultures and sensitivities  - monitor vitals  - GI PCR and C. diff for diarrhea  - obtain RVP as patient is complaining of non-productive cough and rhinorrhea

## 2024-10-10 NOTE — H&P ADULT - HISTORY OF PRESENT ILLNESS
Patient is a 74F w/ PMH of Afib on eliquis, DM2, HLD, GERD, and gout who presents to the hospital with 2 days of burning with urination, increased urinary frequency, LLQ abdominal pain with diarrhea 3-4x/day, and fevers/chills. Pt reports having history of recurrent UTI's, she recently finished a course of Macrobid from 9/13-/917 and was prescribed methenamine 1g BID for UTI ppx. Pt states 2-3 days ago, she began having urinary burning and frequency along with worsening fatigue/weakness and overall not feeling well and more confused than usual describing it as "scramble brained". During this time pt also endorses having 3-4 soft stools daily and denies taking laxatives. Of note, patient has a history of ESBL+ urine cultures (dating back to Jan 2023), previously treated with ertapenem in May 2024.   Pt denies recent sick contacts or travel. Pt denies recent hospitalizations. Pt has home health aids 12 hours/day 7days/week. Pt endorses non-productive cough and rhinorrhea for the past week. Pt denies headache, dizziness, chest pain. Per HHA at bedside, pt's mental status is baseline AOx2-3, depending on the day her awareness of date/time can wax and wane.     ED Course  Vitals: 98.2F, /90, HR 84, RR 15 98% RA  Labs: WBC 15.91, Hgb 14, INR 1.99 PT 23.1 PTT 35.6, Na 142, K 4.5, Ca 10.9, Albumin 3, Lipase 23, CK 20, CKMB <0.5, , lactate 2.9 --> 1.8  UA - large LEC, many bacteria, WBC 60   Imaging:  CT AP and Chest w/o contrast  1. No acute findings.  2. Cholelithiasis without CT evidence of cholecystitis.  3. Nonobstructing renal stones bilaterally. No hydronephrosis. 0.3 cm   stone versus intramural calcification at the posterior bladder.  Interventions: CTX 1g x1, 1.5L NS bolus, meropenem 1g x2

## 2024-10-10 NOTE — H&P ADULT - NSHPLABSRESULTS_GEN_ALL_CORE
.  LABS:                         14.0   15.91 )-----------( 241      ( 09 Oct 2024 19:18 )             44.8     10-09    142  |  106  |  11  ----------------------------<  193[H]  4.5   |  25  |  0.95    Ca    10.9[H]      09 Oct 2024 19:18    TPro  7.1  /  Alb  3.0[L]  /  TBili  0.6  /  DBili  x   /  AST  36  /  ALT  36  /  AlkPhos  117  10-09    PT/INR - ( 09 Oct 2024 19:18 )   PT: 23.1 sec;   INR: 1.99          PTT - ( 09 Oct 2024 19:18 )  PTT:35.6 sec  Urinalysis Basic - ( 09 Oct 2024 19:18 )    Color: Yellow / Appearance: Turbid / S.016 / pH: x  Gluc: 193 mg/dL / Ketone: Negative mg/dL  / Bili: Negative / Urobili: 0.2 mg/dL   Blood: x / Protein: 100 mg/dL / Nitrite: Positive   Leuk Esterase: Large / RBC: 60 /HPF / WBC 60 /HPF   Sq Epi: x / Non Sq Epi: x / Bacteria: Many /HPF      CARDIAC MARKERS ( 09 Oct 2024 19:18 )  x     / x     / x     / x     / <0.5 ng/mL            RADIOLOGY, EKG & ADDITIONAL TESTS: Reviewed.

## 2024-10-10 NOTE — H&P ADULT - PROBLEM SELECTOR PLAN 5
Pt on home Metformin 500mg BID and Januvia 100mg QD    Plan:  - obtain A1c with AM labs  - iSS Pt with history of Afib on Eliquis 5mg BID. Pt is not reporting hematuria or bloody stools, no other signs of acute bleeding    Plan:  - continue eliquis 5mg BID

## 2024-10-10 NOTE — H&P ADULT - PROBLEM SELECTOR PLAN 7
Continue home pantoprazole 40mg QD Pt on home pantoprazole 40mg QD    Plan:  - trial off PPI, can try Pepcid for GERD symptoms Continue home atorvastatin 80mg QD

## 2024-10-10 NOTE — PATIENT PROFILE ADULT - FUNCTIONAL ASSESSMENT - BASIC MOBILITY 6.
2-calculated by average/Not able to assess (calculate score using [facility] averaging method)

## 2024-10-10 NOTE — H&P ADULT - ATTENDING COMMENTS
Ms. Andrew is a 73yo woman w/ PMHx of afib on Eliquis, T2DM, gout, recurrent UTIs (ESBL Klebsiella 04/2024)  who presented to the hospital with several days of dysuria, subjective fevers, diarrhea, and increased confusion, found to meet criteria for severe sepsis, admitted for further management.    Of note, patient with very similar presentation to [facility] in 05/2024, when she presented with abdominal pain, subjective fevers, nausea, vomiting and met criteria for severe sepsis. She was initially treated with ertapenem for ESBL Klebsiellua UTI, but abx were stopped as medical team felt urine culture represented asymptomatic bacteriuria. Patient was found to have entero-rhinovirus and was treated supportively. Also found to have hypercalcemia 2/2 primary hyperparathyroidism.    Today, patient again presenting with multiple symptoms. Patient conversant on exam, but confused (thinks she is in a house in [place]). Endorsing dysuria, frequency, soft bowel movements, dry cough, subjective fevers, and several years of abdominal pain. Patient well-appearing on exam. Labs notable for hypercalcemia (11.7 corrected), leukocytosis to 16, lactate 2.9 --> 1.8 s/p IVF, u/a with leuks/nitrities/bacteria/leuk esterase +squams.    #Altered mental status  #Severe sepsis -- suspect urinary source given symptoms and u/a suggestive of infection. Also endorsing dry cough, CXR no e/o pneumonia. Lower suspicion for viral URI causing sepsis. Also consider GI source given abdominal pain (although chronic) and soft stools. Lactate cleared. Suspect AMS 2/2 infection. Per aide, baseline is AAOx2-3.  - S/p IVF in ED  - C/w meropenem pending urine cx given hx of ESBL  - F/u RVP/covid/flu  - F/u GI PCR, C diff (recent abx use OP)  - F/u urine cx    #Hypercalcemia   #Primary hyperparathyroidism - patient with non-obstructing kidney stones and abdominal pain, which may be 2/2 hypercalcemia, although difficult to attribute abdominal pain solely to hypercalcemia.  - Repeat BMP after IVF in ED  - If still elevated, will start mIVF     #Functional quadriplegia - patient requires assistance with ADLs, has HHA   - Ensure 1:1 assist with meals    Remainder of chronic problems per resident H&P Ms. Andrew is a 75yo woman w/ PMHx of afib on Eliquis, T2DM, gout, recurrent UTIs (ESBL Klebsiella 04/2024)  who presented to the hospital with several days of dysuria, subjective fevers, diarrhea, and increased confusion, found to meet criteria for severe sepsis, admitted for further management.    Of note, patient with very similar presentation to [facility] in 05/2024, when she presented with abdominal pain, subjective fevers, nausea, vomiting and met criteria for severe sepsis. She was initially treated with ertapenem for ESBL Klebsiellua UTI, but abx were stopped as medical team felt urine culture represented asymptomatic bacteriuria. Patient was found to have entero-rhinovirus and was treated supportively. Also found to have hypercalcemia 2/2 primary hyperparathyroidism.    Today, patient again presenting with multiple symptoms. Patient conversant on exam, but confused (thinks she is in a house in [place]). Endorsing dysuria, frequency, soft bowel movements, dry cough, subjective fevers, and several years of abdominal pain. Patient well-appearing on exam. Labs notable for hypercalcemia (11.7 corrected), leukocytosis to 16, lactate 2.9 --> 1.8 s/p IVF, u/a with leuks/nitrities/bacteria/leuk esterase +squams.    #Altered mental status  #Severe sepsis -- suspect urinary source given symptoms and u/a suggestive of infection. Also endorsing dry cough, CXR no e/o pneumonia. Lower suspicion for viral URI causing sepsis. Also consider GI source given abdominal pain (although chronic) and soft stools. Lactate cleared. Suspect AMS 2/2 infection. Per aide, baseline is AAOx2-3.  - S/p IVF in ED  - C/w meropenem pending urine cx given hx of ESBL  - F/u RVP/covid/flu  - F/u GI PCR, C diff (recent abx use OP)  - F/u urine cx    #Hypercalcemia   #Primary hyperparathyroidism - patient with non-obstructing kidney stones and abdominal pain, which may be 2/2 hypercalcemia, although difficult to attribute abdominal pain solely to hypercalcemia.  - Repeat BMP after IVF in ED  - If still elevated, will start mIVF     #Hypomagnesemia  #Hypoalbuminemia  #Hypophosphatemia   - Replete  - Nutrition consult    #Functional quadriplegia - patient requires assistance with ADLs, has HHA   - Ensure 1:1 assist with meals    Remainder of chronic problems per resident H&P

## 2024-10-10 NOTE — ED ADULT NURSE REASSESSMENT NOTE - NS ED NURSE REASSESS COMMENT FT1
pt resting in stretcher comfortably, denies complaints at this time. AAOx4. Maintained on purwick. Visitor at bedside. Rpt lactate sent, 20G LAC free of abnormalities. Pending bed placement.

## 2024-10-10 NOTE — H&P ADULT - PROBLEM SELECTOR PLAN 8
Continue home allopurinol 300mg QD Pt on home pantoprazole 40mg QD    Plan:  - trial off PPI, can try Pepcid for GERD symptoms

## 2024-10-10 NOTE — H&P ADULT - NSHPPHYSICALEXAM_GEN_ALL_CORE
GENERAL: NAD, lying in bed comfortably  HEAD:  Atraumatic, normocephalic. Poor dental hygiene   EYES: EOMI, PERRL  NECK: Supple, trachea midline, no JVD  HEART: Regular rate and rhythm  LUNGS: Unlabored respirations. Clear to auscultation bilaterally, no crackles, wheezing, or rhonchi  ABDOMEN: Soft, nondistended, mildly tender to palpation of LLQ   EXTREMITIES: 2+ peripheral pulses bilaterally. No clubbing, cyanosis, or edema  NERVOUS SYSTEM:  A&Ox2, moving all extremities, no focal deficits

## 2024-10-11 LAB
A1C WITH ESTIMATED AVERAGE GLUCOSE RESULT: 5.6 % — SIGNIFICANT CHANGE UP (ref 4–5.6)
ANION GAP SERPL CALC-SCNC: 10 MMOL/L — SIGNIFICANT CHANGE UP (ref 5–17)
BASOPHILS # BLD AUTO: 0.05 K/UL — SIGNIFICANT CHANGE UP (ref 0–0.2)
BASOPHILS NFR BLD AUTO: 0.6 % — SIGNIFICANT CHANGE UP (ref 0–2)
BUN SERPL-MCNC: 8 MG/DL — SIGNIFICANT CHANGE UP (ref 7–23)
CALCIUM SERPL-MCNC: 10.3 MG/DL — SIGNIFICANT CHANGE UP (ref 8.4–10.5)
CALCIUM SERPL-MCNC: 10.4 MG/DL — SIGNIFICANT CHANGE UP (ref 8.4–10.5)
CHLORIDE SERPL-SCNC: 110 MMOL/L — HIGH (ref 96–108)
CO2 SERPL-SCNC: 22 MMOL/L — SIGNIFICANT CHANGE UP (ref 22–31)
CREAT SERPL-MCNC: 0.6 MG/DL — SIGNIFICANT CHANGE UP (ref 0.5–1.3)
EGFR: 93 ML/MIN/1.73M2 — SIGNIFICANT CHANGE UP
EOSINOPHIL # BLD AUTO: 0.12 K/UL — SIGNIFICANT CHANGE UP (ref 0–0.5)
EOSINOPHIL NFR BLD AUTO: 1.5 % — SIGNIFICANT CHANGE UP (ref 0–6)
ESTIMATED AVERAGE GLUCOSE: 114 MG/DL — SIGNIFICANT CHANGE UP (ref 68–114)
GLUCOSE BLDC GLUCOMTR-MCNC: 132 MG/DL — HIGH (ref 70–99)
GLUCOSE BLDC GLUCOMTR-MCNC: 171 MG/DL — HIGH (ref 70–99)
GLUCOSE BLDC GLUCOMTR-MCNC: 188 MG/DL — HIGH (ref 70–99)
GLUCOSE BLDC GLUCOMTR-MCNC: 202 MG/DL — HIGH (ref 70–99)
GLUCOSE SERPL-MCNC: 139 MG/DL — HIGH (ref 70–99)
HCT VFR BLD CALC: 35.9 % — SIGNIFICANT CHANGE UP (ref 34.5–45)
HGB BLD-MCNC: 11.1 G/DL — LOW (ref 11.5–15.5)
IMM GRANULOCYTES NFR BLD AUTO: 0.5 % — SIGNIFICANT CHANGE UP (ref 0–0.9)
LYMPHOCYTES # BLD AUTO: 1.29 K/UL — SIGNIFICANT CHANGE UP (ref 1–3.3)
LYMPHOCYTES # BLD AUTO: 15.8 % — SIGNIFICANT CHANGE UP (ref 13–44)
MAGNESIUM SERPL-MCNC: 1.7 MG/DL — SIGNIFICANT CHANGE UP (ref 1.6–2.6)
MCHC RBC-ENTMCNC: 29.9 PG — SIGNIFICANT CHANGE UP (ref 27–34)
MCHC RBC-ENTMCNC: 30.9 GM/DL — LOW (ref 32–36)
MCV RBC AUTO: 96.8 FL — SIGNIFICANT CHANGE UP (ref 80–100)
MONOCYTES # BLD AUTO: 0.47 K/UL — SIGNIFICANT CHANGE UP (ref 0–0.9)
MONOCYTES NFR BLD AUTO: 5.7 % — SIGNIFICANT CHANGE UP (ref 2–14)
NEUTROPHILS # BLD AUTO: 6.21 K/UL — SIGNIFICANT CHANGE UP (ref 1.8–7.4)
NEUTROPHILS NFR BLD AUTO: 75.9 % — SIGNIFICANT CHANGE UP (ref 43–77)
NRBC # BLD: 0 /100 WBCS — SIGNIFICANT CHANGE UP (ref 0–0)
PHOSPHATE SERPL-MCNC: 1.6 MG/DL — LOW (ref 2.5–4.5)
PLATELET # BLD AUTO: 203 K/UL — SIGNIFICANT CHANGE UP (ref 150–400)
POTASSIUM SERPL-MCNC: 3.5 MMOL/L — SIGNIFICANT CHANGE UP (ref 3.5–5.3)
POTASSIUM SERPL-SCNC: 3.5 MMOL/L — SIGNIFICANT CHANGE UP (ref 3.5–5.3)
PTH-INTACT FLD-MCNC: 123 PG/ML — HIGH (ref 15–65)
RBC # BLD: 3.71 M/UL — LOW (ref 3.8–5.2)
RBC # FLD: 13.3 % — SIGNIFICANT CHANGE UP (ref 10.3–14.5)
SODIUM SERPL-SCNC: 142 MMOL/L — SIGNIFICANT CHANGE UP (ref 135–145)
WBC # BLD: 8.18 K/UL — SIGNIFICANT CHANGE UP (ref 3.8–10.5)
WBC # FLD AUTO: 8.18 K/UL — SIGNIFICANT CHANGE UP (ref 3.8–10.5)

## 2024-10-11 PROCEDURE — 99233 SBSQ HOSP IP/OBS HIGH 50: CPT | Mod: GC

## 2024-10-11 RX ORDER — PANTOPRAZOLE SODIUM 40 MG/1
1 TABLET, DELAYED RELEASE ORAL
Refills: 0 | DISCHARGE

## 2024-10-11 RX ORDER — POTASSIUM PHOSPHATE, MONOBASIC POTASSIUM PHOSPHATE, DIBASIC 224; 236 MG/ML; MG/ML
30 INJECTION, SOLUTION, CONCENTRATE INTRAVENOUS ONCE
Refills: 0 | Status: COMPLETED | OUTPATIENT
Start: 2024-10-11 | End: 2024-10-11

## 2024-10-11 RX ADMIN — POTASSIUM PHOSPHATE, MONOBASIC POTASSIUM PHOSPHATE, DIBASIC 83.33 MILLIMOLE(S): 224; 236 INJECTION, SOLUTION, CONCENTRATE INTRAVENOUS at 14:31

## 2024-10-11 RX ADMIN — Medication 1: at 13:23

## 2024-10-11 RX ADMIN — Medication 1: at 18:29

## 2024-10-11 RX ADMIN — APIXABAN 5 MILLIGRAM(S): 5 TABLET, FILM COATED ORAL at 17:43

## 2024-10-11 RX ADMIN — Medication 2: at 22:50

## 2024-10-11 RX ADMIN — Medication 300 MILLIGRAM(S): at 17:43

## 2024-10-11 RX ADMIN — MEROPENEM 100 MILLIGRAM(S): 500 INJECTION INTRAVENOUS at 09:58

## 2024-10-11 RX ADMIN — APIXABAN 5 MILLIGRAM(S): 5 TABLET, FILM COATED ORAL at 06:04

## 2024-10-11 RX ADMIN — ATORVASTATIN CALCIUM 80 MILLIGRAM(S): 10 TABLET, FILM COATED ORAL at 17:43

## 2024-10-11 RX ADMIN — MEROPENEM 100 MILLIGRAM(S): 500 INJECTION INTRAVENOUS at 00:44

## 2024-10-11 NOTE — DIETITIAN INITIAL EVALUATION ADULT - PROBLEM SELECTOR PLAN 1
Pt presenting with 2-3 fevers/chills at home, WBC 15.91, lactate 2.8, severe sepsis likely 2/2 UTI and diarrhea. Lactate decrease to 1.8 after receiving 1.5L NS bolus.    Plan:  - obtain blood cultures   - continue meropenem 1g q8 for 7 days  - f/u urine cultures and sensitivities  - monitor vitals  - GI PCR and C. diff for diarrhea  - obtain RVP as patient is complaining of non-productive cough and rhinorrhea

## 2024-10-11 NOTE — DIETITIAN INITIAL EVALUATION ADULT - PROBLEM SELECTOR PLAN 4
67.6
Pt's calcium correction for albumin is 11.2. Prior admission pt had hypercalcium with mildly elevated PTH c/f primary hyperparathyroidism.     Plan:  - r/p CBC, BMP  - f/u PTH  - check Mg and Phos

## 2024-10-11 NOTE — CHART NOTE - NSCHARTNOTEFT_GEN_A_CORE
Infectious Diseases Anti-infective Approval Note    Medication: Meropenem  Dose: 1g  Route: IV  Frequency: q8h  Duration**:   Approving this medication for 3 days. The probable duration of antibiotics for this infection should be 7 days or longer. Place the medication order for 7 days and reassess at that time, and call for additional approval if needed after 3 days.  Purpose:  (check one)       Empiric pending cultures:   x       Empiric, no culture data:       Final duration:    Dose may be adjusted as needed for alterations in renal function.    *THIS IS NOT AN INFECTIOUS DISEASES CONSULTATION*    **Indicates duration of approval, not necessarily duration of treatment
Informed by nurse that patient's IV infiltrated and that two nurses tried to place an IV. Went to bedside with US to place IV with senior resident and patient declined getting stuck again. She was informed of the risks and benefits of having IV access and the need for her to receive her meropenem for treatment. Patient had capacity and understood the risks, informing us that she might be willing again in the morning and to check back with her after her arms have had a rest.

## 2024-10-11 NOTE — PROGRESS NOTE ADULT - SUBJECTIVE AND OBJECTIVE BOX
OVERNIGHT EVENTS:    SUBJECTIVE:       OBJECTIVE:    VITALS:  T(C): 36.8 (10-11-24 @ 06:23), Max: 36.8 (10-11-24 @ 06:23)  HR: 81 (10-11-24 @ 06:23) (78 - 101)  BP: 122/84 (10-11-24 @ 06:23) (118/73 - 149/69)  RR: 17 (10-11-24 @ 06:23) (15 - 18)  SpO2: 98% (10-11-24 @ 06:23) (97% - 99%)    PHYSICAL EXAM:  Constitutional: resting comfortably in bed; NAD  HEENT: NC/AT, EOMI, anicteric sclera, MMM  Neck: supple; no JVD  Respiratory: clear to auscultation bilaterally; no w/r/r  Cardiac: regular rate and rhythm; no m/r/g  GI: abdomen soft, nontender, nondistended; no rebound or guarding  Extremities: warm, no cyanosis, no peripheral edema  Musculoskeletal: NROM x4  Neurologic: AAOx3, no focal deficits, moving all extremities equally  Psychiatric: affect and characteristics of appearance, verbalizations, behaviors    acetaminophen     Tablet .. 650 milliGRAM(s) Oral every 6 hours PRN  allopurinol 300 milliGRAM(s) Oral every 24 hours  apixaban 5 milliGRAM(s) Oral every 12 hours  atorvastatin 80 milliGRAM(s) Oral every 24 hours  dextrose 5%. 1000 milliLiter(s) IV Continuous <Continuous>  dextrose 5%. 1000 milliLiter(s) IV Continuous <Continuous>  dextrose 50% Injectable 25 Gram(s) IV Push once  dextrose 50% Injectable 12.5 Gram(s) IV Push once  dextrose 50% Injectable 25 Gram(s) IV Push once  glucagon  Injectable 1 milliGRAM(s) IntraMuscular once  insulin lispro (ADMELOG) corrective regimen sliding scale   SubCutaneous Before meals and at bedtime  lactated ringers. 1000 milliLiter(s) IV Continuous <Continuous>  meropenem  IVPB 1000 milliGRAM(s) IV Intermittent every 8 hours      Consultant(s) Notes Reviewed:  [x] YES  [ ] NO  Care Discussed with Consultants/Other Providers [x] YES  [ ] NO    LABS:                        12.1   10.06 )-----------( 200      ( 10 Oct 2024 15:46 )             38.8     10-10    139  |  109[H]  |  10  ----------------------------<  191[H]  3.5   |  21[L]  |  0.64    Ca    10.2      10 Oct 2024 20:15  Phos  2.0     10-10  Mg     2.0     10-10    TPro  7.1  /  Alb  3.0[L]  /  TBili  0.6  /  DBili  x   /  AST  36  /  ALT  36  /  AlkPhos  117  10-09    PT/INR - ( 09 Oct 2024 19:18 )   PT: 23.1 sec;   INR: 1.99          PTT - ( 09 Oct 2024 19:18 )  PTT:35.6 sec  Urinalysis Basic - ( 10 Oct 2024 20:15 )    Color: x / Appearance: x / SG: x / pH: x  Gluc: 191 mg/dL / Ketone: x  / Bili: x / Urobili: x   Blood: x / Protein: x / Nitrite: x   Leuk Esterase: x / RBC: x / WBC x   Sq Epi: x / Non Sq Epi: x / Bacteria: x      CAPILLARY BLOOD GLUCOSE      POCT Blood Glucose.: 191 mg/dL (10 Oct 2024 22:10)  POCT Blood Glucose.: 180 mg/dL (10 Oct 2024 17:42)        Urinalysis Basic - ( 10 Oct 2024 20:15 )    Color: x / Appearance: x / SG: x / pH: x  Gluc: 191 mg/dL / Ketone: x  / Bili: x / Urobili: x   Blood: x / Protein: x / Nitrite: x   Leuk Esterase: x / RBC: x / WBC x   Sq Epi: x / Non Sq Epi: x / Bacteria: x        RADIOLOGY, EKG, & ADDITIONAL TESTS:      Imaging Personally Reviewed:  [x] YES  [ ] NO      HEALTH ISSUES - PROBLEM Dx:  Severe sepsis    UTI (urinary tract infection)    Diarrhea    Chronic atrial fibrillation    Type 2 diabetes mellitus    HLD (hyperlipidemia)    GERD (gastroesophageal reflux disease)    Gout    Prophylactic measure    Hypercalcemia         OVERNIGHT EVENTS: Mg 0.7 on admission -> Mg 2.0 after repletion    SUBJECTIVE: AOx2 this AM, pleasant, denies dysuria or any additional BMs overnight.       OBJECTIVE:    VITALS:  T(C): 36.8 (10-11-24 @ 06:23), Max: 36.8 (10-11-24 @ 06:23)  HR: 81 (10-11-24 @ 06:23) (78 - 101)  BP: 122/84 (10-11-24 @ 06:23) (118/73 - 149/69)  RR: 17 (10-11-24 @ 06:23) (15 - 18)  SpO2: 98% (10-11-24 @ 06:23) (97% - 99%)    PHYSICAL EXAM:  Constitutional: resting comfortably in bed; NAD  HEENT: NC/AT, EOMI, anicteric sclera, MMM  Neck: supple; no JVD  Respiratory: clear to auscultation bilaterally; no w/r/r  Cardiac: regular rate and rhythm; no m/r/g  GI: abdomen soft, nontender, nondistended; no rebound or guarding  Extremities: warm, no cyanosis, no peripheral edema  Musculoskeletal: NROM x4  Neurologic: AAOx3, no focal deficits, moving all extremities equally  Psychiatric: affect and characteristics of appearance, verbalizations, behaviors    acetaminophen     Tablet .. 650 milliGRAM(s) Oral every 6 hours PRN  allopurinol 300 milliGRAM(s) Oral every 24 hours  apixaban 5 milliGRAM(s) Oral every 12 hours  atorvastatin 80 milliGRAM(s) Oral every 24 hours  dextrose 5%. 1000 milliLiter(s) IV Continuous <Continuous>  dextrose 5%. 1000 milliLiter(s) IV Continuous <Continuous>  dextrose 50% Injectable 25 Gram(s) IV Push once  dextrose 50% Injectable 12.5 Gram(s) IV Push once  dextrose 50% Injectable 25 Gram(s) IV Push once  glucagon  Injectable 1 milliGRAM(s) IntraMuscular once  insulin lispro (ADMELOG) corrective regimen sliding scale   SubCutaneous Before meals and at bedtime  lactated ringers. 1000 milliLiter(s) IV Continuous <Continuous>  meropenem  IVPB 1000 milliGRAM(s) IV Intermittent every 8 hours      Consultant(s) Notes Reviewed:  [x] YES  [ ] NO  Care Discussed with Consultants/Other Providers [x] YES  [ ] NO    LABS:                        12.1   10.06 )-----------( 200      ( 10 Oct 2024 15:46 )             38.8     10-10    139  |  109[H]  |  10  ----------------------------<  191[H]  3.5   |  21[L]  |  0.64    Ca    10.2      10 Oct 2024 20:15  Phos  2.0     10-10  Mg     2.0     10-10    TPro  7.1  /  Alb  3.0[L]  /  TBili  0.6  /  DBili  x   /  AST  36  /  ALT  36  /  AlkPhos  117  10-09    PT/INR - ( 09 Oct 2024 19:18 )   PT: 23.1 sec;   INR: 1.99          PTT - ( 09 Oct 2024 19:18 )  PTT:35.6 sec  Urinalysis Basic - ( 10 Oct 2024 20:15 )    Color: x / Appearance: x / SG: x / pH: x  Gluc: 191 mg/dL / Ketone: x  / Bili: x / Urobili: x   Blood: x / Protein: x / Nitrite: x   Leuk Esterase: x / RBC: x / WBC x   Sq Epi: x / Non Sq Epi: x / Bacteria: x      CAPILLARY BLOOD GLUCOSE      POCT Blood Glucose.: 191 mg/dL (10 Oct 2024 22:10)  POCT Blood Glucose.: 180 mg/dL (10 Oct 2024 17:42)        Urinalysis Basic - ( 10 Oct 2024 20:15 )    Color: x / Appearance: x / SG: x / pH: x  Gluc: 191 mg/dL / Ketone: x  / Bili: x / Urobili: x   Blood: x / Protein: x / Nitrite: x   Leuk Esterase: x / RBC: x / WBC x   Sq Epi: x / Non Sq Epi: x / Bacteria: x        RADIOLOGY, EKG, & ADDITIONAL TESTS:      Imaging Personally Reviewed:  [x] YES  [ ] NO      HEALTH ISSUES - PROBLEM Dx:  Severe sepsis    UTI (urinary tract infection)    Diarrhea    Chronic atrial fibrillation    Type 2 diabetes mellitus    HLD (hyperlipidemia)    GERD (gastroesophageal reflux disease)    Gout    Prophylactic measure    Hypercalcemia

## 2024-10-11 NOTE — PROGRESS NOTE ADULT - PROBLEM SELECTOR PLAN 4
Resolved.  Pt's calcium correction for albumin is 11.2. Prior admission pt had hypercalcium with mildly elevated PTH c/f primary hyperparathyroidism.     Plan:  - r/p CBC, BMP  - f/u PTH Resolved.  Pt's calcium correction for albumin is 11.2. Prior admission pt had hypercalcium with mildly elevated PTH c/f primary hyperparathyroidism.     PTH elevated 123    Plan:  - r/p CBC, BMP

## 2024-10-11 NOTE — DIETITIAN INITIAL EVALUATION ADULT - ADD RECOMMEND
-Continue current diet  -Encourage good PO intake   -Honor food preferences as able  -Weekly wts  -Monitor chemistry, GI function, and skin integrity

## 2024-10-11 NOTE — DIETITIAN INITIAL EVALUATION ADULT - PERTINENT LABORATORY DATA
10-11    142  |  110[H]  |  8   ----------------------------<  139[H]  3.5   |  22  |  0.60    Ca    10.3      11 Oct 2024 07:52  Phos  1.6     10-11  Mg     1.7     10-11    TPro  7.1  /  Alb  3.0[L]  /  TBili  0.6  /  DBili  x   /  AST  36  /  ALT  36  /  AlkPhos  117  10-09  POCT Blood Glucose.: 171 mg/dL (10-11-24 @ 12:58)  A1C with Estimated Average Glucose Result: 5.6 % (10-11-24 @ 07:52)  A1C with Estimated Average Glucose Result: 5.9 % (05-02-24 @ 05:30)

## 2024-10-11 NOTE — DIETITIAN INITIAL EVALUATION ADULT - OTHER INFO
74F w/ PMH of Afib on Eliquis, HLD, DM2, GERD, gout, and hx of recurrent UTIs recently treated for ESBL+ UTI w/ ertapenem (5/2024), who presents to the hospital with 2-3 days of dysuria, fevers/chills, diarrhea, increased confusion found to meet severe sepsis, admitted to [facility] for management of severe sepsis iso UTI and diarrhea.      Pt assessed in room on 7UR. Rx and labs reviewed. Pt presents with no overt signs of nutritional wasting. Pt received resting in bed, alert and participatory in nutrition assessment. Pt reports a fair appetite and reflective PO intake; meal tray at bedside <50% consumed. Encouraged good PO intake with an emphasis on protein; pt agreeable and verbalized understanding. Encouraged pt order preferred foods with host; food preferences updated and relayed to kitchen. Pt states she cannot recall usual body weight; previous admission wt of 127 kg in 4/2023, current body weight 110 kg reflects 17 kg wt loss in 6 months (13.4%BW). Pt reports no significant changes to appetite or PO intake PTA. No complaints of nausea/vomiting/constipation/diarrhea or difficult chew/swallow. NKA to food. RDN to remain available, see recommendations below.     Pain: 0 per chart review   GI: Abdomen non-distended/non-tender, +BS x4, last bowel movement 10/10  Skin: pressure injury stage II buttocks, +1 bilateral ankle

## 2024-10-11 NOTE — DISCHARGE NOTE PROVIDER - NSDCCPCAREPLAN_GEN_ALL_CORE_FT
PRINCIPAL DISCHARGE DIAGNOSIS  Diagnosis: Acute UTI  Assessment and Plan of Treatment: Urinary tract infections are more common in women. They usually occur in the bladder or urethra, but more serious infections involve the kidney.  A bladder infection may cause pelvic pain, increased urge to urinate, pain with urination, and blood in the urine. A kidney infection may cause back pain, nausea, vomiting, and fever. Common treatment is with antibiotics. Your urine cultures grew bacteria so we gave you antibiotics.   ---  - Please take BACTRIM as prescribed until October 23rd.  - Please follow up with your Primary Care Physician within 2 weeks or if your symptoms come back.

## 2024-10-11 NOTE — DIETITIAN INITIAL EVALUATION ADULT - PROBLEM SELECTOR PLAN 3
Pt reporting 2-3 days of 3-4x/day soft stools w/ LLQ abdominal pain and recent antibx use and possible URI based on self-reported symptoms     Plan:  - f/u GI PCR and Cdiff  - f/u RVP  - monitor stool counts

## 2024-10-11 NOTE — PROGRESS NOTE ADULT - PROBLEM SELECTOR PLAN 2
Pt with history of recurrent UTIs, most recently treated with Macrobid 9/13-17 and started on methenamine for UTI ppx. Pt has +ESBL urine cultures on Jan 2023 and May 2024, was treated with ertapenem on prior May 2024 admission.     Plan:  - meropenem 1g q8 for 7 days iso of prior history of ESBL UTI (10/10 - )  - f/u BCx, urine cultures and sensitivities  - check bladder scans to r/o retention as cause of recurrent UTIs Pt with history of recurrent UTIs, most recently treated with Macrobid 9/13-17 and started on methenamine for UTI ppx. Pt has +ESBL urine cultures on Jan 2023 and May 2024, was treated with ertapenem on prior May 2024 admission.     Plan:  - meropenem 1g q8 x7 days iso of prior history of ESBL UTI (10/10 - )  - f/u BCx, urine cultures and sensitivities  - q6h bladder scans to r/o retention as cause of recurrent UTIs

## 2024-10-11 NOTE — DIETITIAN INITIAL EVALUATION ADULT - REASON FOR ADMISSION
ABD PAIN

## 2024-10-11 NOTE — DISCHARGE NOTE PROVIDER - HOSPITAL COURSE
#Discharge: do not delete    74F w/ PMH of Afib on Eliquis, HLD, DM2, GERD, gout, and hx of recurrent UTIs recently treated for ESBL+ UTI w/ ertapenem (5/2024), who presents to the hospital with 2-3 days of dysuria, fevers/chills, diarrhea, increased confusion found to meet severe sepsis, admitted to [facility] for management of severe sepsis iso UTI and diarrhea.      Hospital Course (by problem):    #Severe sepsis.   #UTI (urinary tract infection).   P/w 2-3 days of fevers/chills at home, WBC 15.91, lactate 2.8, severe sepsis likely 2/2 UTI and diarrhea. Lactate decrease to 1.8 after receiving 1.5L NS bolus.  Pt with history of recurrent UTIs, most recently treated with Macrobid 9/13-17 and started on methenamine for UTI ppx. Pt has +ESBL urine cultures on Jan 2023 and May 2024, was treated with ertapenem on prior May 2024 admission. UCx grew ************.   Pt received meropenem (10/10 - ) and was transitioned to po ************.     #Diarrhea.   Pt reporting 2-3 days of 3-4x/day soft stools w/ LLQ abdominal pain and recent antibx use. However, did not have diarrhea during admission************  - f/u GI PCR and Cdiff    #Hypercalcemia.   Resolved.  Pt's calcium correction for albumin is 11.2. Prior admission pt had hypercalcium with mildly elevated PTH c/f primary hyperparathyroidism.     PTH elevated 123    Plan:  - r/p CBC, BMP.     Problem/Plan - 5:  ·  Problem: Chronic atrial fibrillation.   ·  Plan: Pt with history of Afib on Eliquis 5mg BID. Pt is not reporting hematuria or bloody stools, no other signs of acute bleeding    Plan:  - continue eliquis 5mg BID.     Problem/Plan - 6:  ·  Problem: Type 2 diabetes mellitus.   ·  Plan: Pt on home Metformin 500mg BID and Januvia 100mg QD  A1c 5.6.    Plan:  - iSS.     Problem/Plan - 7:  ·  Problem: HLD (hyperlipidemia).   ·  Plan: Continue home atorvastatin 80mg QD.     Problem/Plan - 8:  ·  Problem: GERD (gastroesophageal reflux disease).   ·  Plan: Pt on home pantoprazole 40mg QD.  Suspect low Mg 2/2 chronic PPI use.    Plan:  - trial off PPI, can try Pepcid if needed for GERD symptoms.     Problem/Plan - 9:  ·  Problem: Gout.   ·  Plan: Continue home allopurinol 300mg QD.         Patient was discharged to: (home/[facility]/acute rehab/hospice, etc, and with what services – home health PT/RN? Home O2?)       New medications:  Changes to old medications:  Medications that were stopped:       Items to follow up as outpatient:      Physical exam at the time of discharge:  Constitutional: resting comfortably in bed; NAD  HEENT: NC/AT, EOMI, anicteric sclera, MMM  Neck: supple; no JVD  Respiratory: clear to auscultation bilaterally; no w/r/r  Cardiac: regular rate and rhythm; no m/r/g  GI: abdomen soft, nontender, nondistended; no rebound or guarding  Extremities: warm, no cyanosis, no peripheral edema  Musculoskeletal: NROM x4; no joint swelling, tenderness or erythema  Neurologic: AAOx3, no focal deficits, moving all extremities equally  Psychiatric: affect and characteristics of appearance, verbalizations, behaviors   #Discharge: do not delete    74F w/ PMH of Afib on Eliquis, HLD, DM2, GERD, gout, and hx of recurrent UTIs recently treated for ESBL+ UTI w/ ertapenem (5/2024), who presents to the hospital with 2-3 days of dysuria, fevers/chills, diarrhea, increased confusion found to meet severe sepsis, admitted to [facility] for management of severe sepsis iso UTI and diarrhea.      Hospital Course (by problem):    #Severe sepsis.   #UTI (urinary tract infection).   P/w 2-3 days of fevers/chills at home, WBC 15.91, lactate 2.8, severe sepsis likely 2/2 UTI and diarrhea. Lactate decrease to 1.8 after receiving 1.5L NS bolus.  Pt with history of recurrent UTIs, most recently treated with Macrobid 9/13-17 and started on methenamine for UTI ppx. Pt has +ESBL urine cultures on Jan 2023 and May 2024, was treated with ertapenem on prior May 2024 admission. UCx grew ************.   Pt received meropenem (10/10 - ) and was transitioned to po ************.     #Diarrhea.   Pt reporting 2-3 days of 3-4x/day soft stools w/ LLQ abdominal pain and recent antibx use. However, did not have diarrhea during admission************  - f/u GI PCR and Cdiff    #Hypercalcemia.   Resolved.  Pt's calcium correction for albumin is 11.2. Prior admission pt had hypercalcium with mildly elevated PTH c/f primary hyperparathyroidism.   PTH elevated 123    #GERD (gastroesophageal reflux disease).   Pt takes pantoprazole 40mg QD at home. Mg 0.7 on admission, suspect 2/2 chronic PPI use.  - trial off PPI, can try Pepcid if needed for GERD symptoms.    # Chronic atrial fibrillation.   Pt with history of Afib on Eliquis 5mg BID. Pt is not reporting hematuria or bloody stools, no other signs of acute bleeding  - continue eliquis 5mg BID.     #Type 2 diabetes mellitus.   Pt on home Metformin 500mg BID and Januvia 100mg QD  A1c 5.6.    #HLD (hyperlipidemia).   Continue home atorvastatin 80mg QD.    #Gout.   ·  Plan: Continue home allopurinol 300mg QD.       Patient was discharged to: (home/[facility]/acute rehab/hospice, etc, and with what services – home health PT/RN? Home O2?)       New medications:  Changes to old medications:  Medications that were stopped:       Items to follow up as outpatient: UTI, GERD management      Physical exam at the time of discharge:  Constitutional: resting comfortably in bed; NAD  HEENT: NC/AT, EOMI, anicteric sclera, MMM  Neck: supple; no JVD  Respiratory: clear to auscultation bilaterally; no w/r/r  Cardiac: regular rate and rhythm; no m/r/g  GI: abdomen soft, nontender, nondistended; no rebound or guarding  Extremities: warm, no cyanosis, no peripheral edema  Musculoskeletal: NROM x4; no joint swelling, tenderness or erythema  Neurologic: AAOx2-3, no focal deficits, moving all extremities equally  Psychiatric: affect and characteristics of appearance, verbalizations, behaviors   #Discharge: do not delete    74F w/ PMH of Afib on Eliquis, HLD, DM2, GERD, gout, and hx of recurrent UTIs recently treated for ESBL+ UTI w/ ertapenem (5/2024), who presents to the hospital with 2-3 days of dysuria, fevers/chills, diarrhea, increased confusion found to meet severe sepsis, admitted to [facility] for management of severe sepsis iso UTI and diarrhea.      Hospital Course (by problem):    #Severe sepsis.   #UTI (urinary tract infection).   P/w 2-3 days of fevers/chills at home, WBC 15.91, lactate 2.8, severe sepsis likely 2/2 UTI and diarrhea. Lactate decrease to 1.8 after receiving 1.5L NS bolus.  Pt with history of recurrent UTIs, most recently treated with Macrobid 9/13-17 and started on methenamine for UTI ppx. Pt has +ESBL urine cultures on Jan 2023 and May 2024, was treated with ertapenem on prior May 2024 admission. UCx grew ESBL Klebsiella sensitive to Bactrim.   Pt received meropenem (10/10 - 10/13) and was transitioned to po Bactrim.     #Diarrhea.   Pt reporting 2-3 days of 3-4x/day soft stools w/ LLQ abdominal pain and recent antibx use. However, did not have diarrhea during admission************  - f/u GI PCR and Cdiff    #Hypercalcemia.   Resolved.  Pt's calcium correction for albumin is 11.2. Prior admission pt had hypercalcium with mildly elevated PTH c/f primary hyperparathyroidism.   PTH elevated 123    #GERD (gastroesophageal reflux disease).   Pt takes pantoprazole 40mg QD at home. Mg 0.7 on admission, suspect 2/2 chronic PPI use.  - trial off PPI, can try Pepcid if needed for GERD symptoms.    # Chronic atrial fibrillation.   Pt with history of Afib on Eliquis 5mg BID. Pt is not reporting hematuria or bloody stools, no other signs of acute bleeding  - continue eliquis 5mg BID.     #Type 2 diabetes mellitus.   Pt on home Metformin 500mg BID and Januvia 100mg QD  A1c 5.6.    #HLD (hyperlipidemia).   Continue home atorvastatin 80mg QD.    #Gout.   ·  Plan: Continue home allopurinol 300mg QD.       Patient was discharged to: (home/[facility]/acute rehab/hospice, etc, and with what services – home health PT/RN? Home O2?)       New medications:  Changes to old medications:  Medications that were stopped:       Items to follow up as outpatient: UTI, GERD management      Physical exam at the time of discharge:  Constitutional: resting comfortably in bed; NAD  HEENT: NC/AT, EOMI, anicteric sclera, MMM  Neck: supple; no JVD  Respiratory: clear to auscultation bilaterally; no w/r/r  Cardiac: regular rate and rhythm; no m/r/g  GI: abdomen soft, nontender, nondistended; no rebound or guarding  Extremities: warm, no cyanosis, no peripheral edema  Musculoskeletal: NROM x4; no joint swelling, tenderness or erythema  Neurologic: AAOx2-3, no focal deficits, moving all extremities equally  Psychiatric: affect and characteristics of appearance, verbalizations, behaviors   #Discharge: do not delete    74F w/ PMH of Afib on Eliquis, HLD, DM2, GERD, gout, and hx of recurrent UTIs recently treated for ESBL+ UTI w/ ertapenem (5/2024), who presents to the hospital with 2-3 days of dysuria, fevers/chills, diarrhea, increased confusion found to meet severe sepsis, admitted to [facility] for management of severe sepsis iso UTI and diarrhea.      Hospital Course (by problem):    #Severe sepsis.   #UTI (urinary tract infection).   P/w 2-3 days of fevers/chills at home, WBC 15.91, lactate 2.8, severe sepsis likely 2/2 UTI and diarrhea. Lactate decrease to 1.8 after receiving 1.5L NS bolus.  Pt with history of recurrent UTIs, most recently treated with Macrobid 9/13-17 and started on methenamine for UTI ppx. Pt has +ESBL urine cultures on Jan 2023 and May 2024, was treated with ertapenem on prior May 2024 admission. UCx grew ESBL Klebsiella sensitive to Bactrim. Unclear why patient has recurrent UTIs, BS x3 wnl.   Pt received meropenem (10/10 - 10/13) and was transitioned to po Bactrim DS (10/13 - 10/23) for total 14 day course.    #Diarrhea.   Pt reporting 2-3 days of 3-4x/day soft stools w/ LLQ abdominal pain and recent antibx use. However, did not have diarrhea during admission.     #Hypercalcemia.   Resolved.  Pt's calcium correction for albumin is 11.2. Prior admission pt had hypercalcium with mildly elevated PTH. PTH elevated 123 this admission.    #GERD (gastroesophageal reflux disease).   Pt takes pantoprazole 40mg QD at home. Mg 0.7 on admission, suspect 2/2 chronic PPI use.  - trial off PPI, can try Pepcid if needed for GERD symptoms.    # Chronic atrial fibrillation.   Pt with history of Afib on Eliquis 5mg BID. Pt is not reporting hematuria or bloody stools, no other signs of acute bleeding  - continue eliquis 5mg BID.     #Type 2 diabetes mellitus.   Pt on home Metformin 500mg BID and Januvia 100mg QD  A1c 5.6.    #HLD (hyperlipidemia).   Continue home atorvastatin 80mg QD.    #Gout.   ·  Plan: Continue home allopurinol 300mg QD.       Patient was discharged to: home with [facility]       New medications: Bactrim DS  Changes to old medications: none  Medications that were stopped: pantoprazole       Items to follow up as outpatient: UTI, GERD management, elevated PTH      Physical exam at the time of discharge:  Constitutional: resting comfortably in bed; NAD  HEENT: NC/AT, EOMI, anicteric sclera, MMM  Neck: supple; no JVD  Respiratory: clear to auscultation bilaterally; no w/r/r  Cardiac: regular rate and rhythm; no m/r/g  GI: abdomen soft, nontender, nondistended; no rebound or guarding  Extremities: warm, no cyanosis, no peripheral edema  Musculoskeletal: NROM x4; no joint swelling, tenderness or erythema  Neurologic: AAOx2-3, no focal deficits, moving all extremities equally  Psychiatric: affect and characteristics of appearance, verbalizations, behaviors

## 2024-10-11 NOTE — DIETITIAN INITIAL EVALUATION ADULT - PROBLEM SELECTOR PLAN 2
Pt with history of recurrent UTIs, most recently treated with Macrobid 9/13-17 and started on methenamine for UTI ppx. Pt has +ESBL urine cultures on Jan 2023 and May 2024, was treated with ertapenem on prior May 2024 admission.     Plan:  - continue meropenem 1g q8 for 7 days iso of prior history of ESBL UTI  - f/u urine cultures and sensitivities  - check bladder scans to r/o retention as cause of recurrent UTIs

## 2024-10-11 NOTE — DISCHARGE NOTE PROVIDER - NSDCFUADDAPPT_GEN_ALL_CORE_FT
Please follow up with your Primary Care Physician within 1-2 weeks of discharge from the hospital.

## 2024-10-11 NOTE — DIETITIAN INITIAL EVALUATION ADULT - PERTINENT MEDS FT
MEDICATIONS  (STANDING):  allopurinol 300 milliGRAM(s) Oral every 24 hours  apixaban 5 milliGRAM(s) Oral every 12 hours  atorvastatin 80 milliGRAM(s) Oral every 24 hours  dextrose 5%. 1000 milliLiter(s) (50 mL/Hr) IV Continuous <Continuous>  dextrose 5%. 1000 milliLiter(s) (100 mL/Hr) IV Continuous <Continuous>  dextrose 50% Injectable 25 Gram(s) IV Push once  dextrose 50% Injectable 12.5 Gram(s) IV Push once  dextrose 50% Injectable 25 Gram(s) IV Push once  glucagon  Injectable 1 milliGRAM(s) IntraMuscular once  insulin lispro (ADMELOG) corrective regimen sliding scale   SubCutaneous Before meals and at bedtime  lactated ringers. 1000 milliLiter(s) (100 mL/Hr) IV Continuous <Continuous>  meropenem  IVPB 1000 milliGRAM(s) IV Intermittent every 8 hours    MEDICATIONS  (PRN):  acetaminophen     Tablet .. 650 milliGRAM(s) Oral every 6 hours PRN Temp greater or equal to 38C (100.4F), Mild Pain (1 - 3)

## 2024-10-11 NOTE — PROGRESS NOTE ADULT - PROBLEM SELECTOR PLAN 6
Pt on home Metformin 500mg BID and Januvia 100mg QD    Plan:  - obtain A1c with AM labs  - iSS Pt on home Metformin 500mg BID and Januvia 100mg QD  A1c 5.6.    Plan:  - iSS

## 2024-10-11 NOTE — PROGRESS NOTE ADULT - PROBLEM SELECTOR PLAN 8
Pt on home pantoprazole 40mg QD    Plan:  - trial off PPI, can try Pepcid for GERD symptoms Pt on home pantoprazole 40mg QD.  Suspect low Mg 2/2 chronic PPI use.    Plan:  - trial off PPI, can try Pepcid if needed for GERD symptoms

## 2024-10-11 NOTE — DISCHARGE NOTE PROVIDER - NSDCMRMEDTOKEN_GEN_ALL_CORE_FT
allopurinol 300 mg oral tablet: 1 tab(s) orally once a day  apixaban 5 mg oral tablet: 1 tab(s) orally 2 times a day  atorvastatin 80 mg oral tablet: 1 tab(s) orally once a day  metFORMIN 500 mg oral tablet: 1 tab(s) orally 2 times a day  methenamine hippurate 1 g oral tablet: 1 tab(s) orally 2 times a day  pantoprazole 40 mg oral delayed release tablet: 1 tab(s) orally once a day  potassium chloride: 20 milliequivalent(s) orally once a day  SITagliptin (as base) 100 mg oral tablet: 1 tab(s) orally once a day   allopurinol 300 mg oral tablet: 1 tab(s) orally once a day  apixaban 5 mg oral tablet: 1 tab(s) orally 2 times a day  atorvastatin 80 mg oral tablet: 1 tab(s) orally once a day  metFORMIN 500 mg oral tablet: 1 tab(s) orally 2 times a day  methenamine hippurate 1 g oral tablet: 1 tab(s) orally 2 times a day  potassium chloride: 20 milliequivalent(s) orally once a day  SITagliptin (as base) 100 mg oral tablet: 1 tab(s) orally once a day   allopurinol 300 mg oral tablet: 1 tab(s) orally once a day  apixaban 5 mg oral tablet: 1 tab(s) orally 2 times a day  atorvastatin 80 mg oral tablet: 1 tab(s) orally once a day  Bactrim  mg-160 mg oral tablet: 1 tab(s) orally every 12 hours Take ONE tablet TWO times a day from 10/14 to 10/23.  metFORMIN 500 mg oral tablet: 1 tab(s) orally 2 times a day  methenamine hippurate 1 g oral tablet: 1 tab(s) orally 2 times a day  potassium chloride: 20 milliequivalent(s) orally once a day  SITagliptin (as base) 100 mg oral tablet: 1 tab(s) orally once a day

## 2024-10-12 LAB
ANION GAP SERPL CALC-SCNC: 9 MMOL/L — SIGNIFICANT CHANGE UP (ref 5–17)
BASOPHILS # BLD AUTO: 0.03 K/UL — SIGNIFICANT CHANGE UP (ref 0–0.2)
BASOPHILS NFR BLD AUTO: 0.4 % — SIGNIFICANT CHANGE UP (ref 0–2)
BUN SERPL-MCNC: 6 MG/DL — LOW (ref 7–23)
CALCIUM SERPL-MCNC: 9.9 MG/DL — SIGNIFICANT CHANGE UP (ref 8.4–10.5)
CHLORIDE SERPL-SCNC: 110 MMOL/L — HIGH (ref 96–108)
CO2 SERPL-SCNC: 22 MMOL/L — SIGNIFICANT CHANGE UP (ref 22–31)
CREAT SERPL-MCNC: 0.52 MG/DL — SIGNIFICANT CHANGE UP (ref 0.5–1.3)
EGFR: 96 ML/MIN/1.73M2 — SIGNIFICANT CHANGE UP
EOSINOPHIL # BLD AUTO: 0.19 K/UL — SIGNIFICANT CHANGE UP (ref 0–0.5)
EOSINOPHIL NFR BLD AUTO: 2.5 % — SIGNIFICANT CHANGE UP (ref 0–6)
GLUCOSE BLDC GLUCOMTR-MCNC: 138 MG/DL — HIGH (ref 70–99)
GLUCOSE BLDC GLUCOMTR-MCNC: 155 MG/DL — HIGH (ref 70–99)
GLUCOSE BLDC GLUCOMTR-MCNC: 173 MG/DL — HIGH (ref 70–99)
GLUCOSE BLDC GLUCOMTR-MCNC: 188 MG/DL — HIGH (ref 70–99)
GLUCOSE BLDC GLUCOMTR-MCNC: 193 MG/DL — HIGH (ref 70–99)
GLUCOSE SERPL-MCNC: 153 MG/DL — HIGH (ref 70–99)
HCT VFR BLD CALC: 34.4 % — LOW (ref 34.5–45)
HGB BLD-MCNC: 11 G/DL — LOW (ref 11.5–15.5)
IMM GRANULOCYTES NFR BLD AUTO: 0.7 % — SIGNIFICANT CHANGE UP (ref 0–0.9)
LYMPHOCYTES # BLD AUTO: 2.01 K/UL — SIGNIFICANT CHANGE UP (ref 1–3.3)
LYMPHOCYTES # BLD AUTO: 26.6 % — SIGNIFICANT CHANGE UP (ref 13–44)
MAGNESIUM SERPL-MCNC: 1.5 MG/DL — LOW (ref 1.6–2.6)
MCHC RBC-ENTMCNC: 31.4 PG — SIGNIFICANT CHANGE UP (ref 27–34)
MCHC RBC-ENTMCNC: 32 GM/DL — SIGNIFICANT CHANGE UP (ref 32–36)
MCV RBC AUTO: 98.3 FL — SIGNIFICANT CHANGE UP (ref 80–100)
MONOCYTES # BLD AUTO: 0.45 K/UL — SIGNIFICANT CHANGE UP (ref 0–0.9)
MONOCYTES NFR BLD AUTO: 5.9 % — SIGNIFICANT CHANGE UP (ref 2–14)
NEUTROPHILS # BLD AUTO: 4.84 K/UL — SIGNIFICANT CHANGE UP (ref 1.8–7.4)
NEUTROPHILS NFR BLD AUTO: 63.9 % — SIGNIFICANT CHANGE UP (ref 43–77)
NRBC # BLD: 0 /100 WBCS — SIGNIFICANT CHANGE UP (ref 0–0)
PHOSPHATE SERPL-MCNC: 1.8 MG/DL — LOW (ref 2.5–4.5)
PLATELET # BLD AUTO: 186 K/UL — SIGNIFICANT CHANGE UP (ref 150–400)
POTASSIUM SERPL-MCNC: 3.6 MMOL/L — SIGNIFICANT CHANGE UP (ref 3.5–5.3)
POTASSIUM SERPL-SCNC: 3.6 MMOL/L — SIGNIFICANT CHANGE UP (ref 3.5–5.3)
RBC # BLD: 3.5 M/UL — LOW (ref 3.8–5.2)
RBC # FLD: 13.5 % — SIGNIFICANT CHANGE UP (ref 10.3–14.5)
SODIUM SERPL-SCNC: 141 MMOL/L — SIGNIFICANT CHANGE UP (ref 135–145)
WBC # BLD: 7.57 K/UL — SIGNIFICANT CHANGE UP (ref 3.8–10.5)
WBC # FLD AUTO: 7.57 K/UL — SIGNIFICANT CHANGE UP (ref 3.8–10.5)

## 2024-10-12 RX ORDER — POTASSIUM PHOSPHATE, MONOBASIC POTASSIUM PHOSPHATE, DIBASIC 224; 236 MG/ML; MG/ML
15 INJECTION, SOLUTION, CONCENTRATE INTRAVENOUS ONCE
Refills: 0 | Status: COMPLETED | OUTPATIENT
Start: 2024-10-12 | End: 2024-10-12

## 2024-10-12 RX ORDER — MAGNESIUM SULFATE 500 MG/ML
4 VIAL (ML) INJECTION ONCE
Refills: 0 | Status: COMPLETED | OUTPATIENT
Start: 2024-10-12 | End: 2024-10-12

## 2024-10-12 RX ADMIN — MEROPENEM 100 MILLIGRAM(S): 500 INJECTION INTRAVENOUS at 09:20

## 2024-10-12 RX ADMIN — APIXABAN 5 MILLIGRAM(S): 5 TABLET, FILM COATED ORAL at 05:34

## 2024-10-12 RX ADMIN — ATORVASTATIN CALCIUM 80 MILLIGRAM(S): 10 TABLET, FILM COATED ORAL at 17:57

## 2024-10-12 RX ADMIN — Medication 1: at 13:39

## 2024-10-12 RX ADMIN — Medication 650 MILLIGRAM(S): at 15:27

## 2024-10-12 RX ADMIN — Medication 1: at 18:42

## 2024-10-12 RX ADMIN — APIXABAN 5 MILLIGRAM(S): 5 TABLET, FILM COATED ORAL at 17:58

## 2024-10-12 RX ADMIN — POTASSIUM PHOSPHATE, MONOBASIC POTASSIUM PHOSPHATE, DIBASIC 62.5 MILLIMOLE(S): 224; 236 INJECTION, SOLUTION, CONCENTRATE INTRAVENOUS at 14:43

## 2024-10-12 RX ADMIN — Medication 300 MILLIGRAM(S): at 17:58

## 2024-10-12 RX ADMIN — Medication 650 MILLIGRAM(S): at 16:27

## 2024-10-12 RX ADMIN — Medication 1: at 21:56

## 2024-10-12 RX ADMIN — Medication 25 GRAM(S): at 09:05

## 2024-10-12 RX ADMIN — MEROPENEM 100 MILLIGRAM(S): 500 INJECTION INTRAVENOUS at 03:05

## 2024-10-12 NOTE — PROGRESS NOTE ADULT - ATTENDING COMMENTS
Patient reports feeling improved, suprapubic pain resolved, no diarrhea, no abdominal pain. Denies any other complaints    General: AOx2, NAD, lying in bed, speaking in full sentences, no labored breathing o nRA  HEENT: AT/NC, no facial asymmetry   Lungs: poor inspiration, no crackles, no wheezes  Abdomen: soft, no tenderness, no suprapubic tenderness, + BS  Extremities: warn, no edema, no tenderness, no calf tenderness    Labs, imaging reviewed    Severe sepsis  Hypophosphatemia  Hypomagnesemia  Hypercalcemia    Continue on Meropenem, follow-up urine culture. Leucocytosis resolved, Replete electrolytes, hold PPI, can use Famotidine.   Hypercalcemia improved after IVF, monitor for constipation, aim for 1 BM/Day  Trend Ca  DVT ppx: AC
73 yo F with a PMH of afib on Eliquis, HLD, T2DM, GERD, gout, and hx of recurrent UTIs recently treated for ESBL UTI with ertapenem, presenting to the hospital with several days of dysuria, fevers, diarrhea, and increased confusion, found to have severe sepsis, admitted to floors for UTI treatment.     VS reviewed and stable on RA     Exam:   Appears comfortable   MMM  Normal WOB on RA, CTAB   RRR, no mrg   Abdomen soft, nontender, nondistended  Extremities warm and without edema   AO to person, no gross focal neuro deficits     Labs reviewed, all stable   Low magnesium, low phos     Imaging reviewed     Plan:   -Continue meropenem for UTI for now  -Ensure not retaining urine with bladder scans   -Follow up urine culture sensitivities, narrow as able   -Diarrhea improving spontaneously, unlikely to be infectious in origin     Rest as per resident note.

## 2024-10-12 NOTE — PROGRESS NOTE ADULT - PROBLEM SELECTOR PLAN 8
Pt on home pantoprazole 40mg QD.  Suspect low Mg 2/2 chronic PPI use.    Plan:  - trial off PPI, can try Pepcid if needed for GERD symptoms

## 2024-10-12 NOTE — PROGRESS NOTE ADULT - PROBLEM SELECTOR PLAN 2
Pt with history of recurrent UTIs, most recently treated with Macrobid 9/13-17 and started on methenamine for UTI ppx. Pt has +ESBL urine cultures on Jan 2023 and May 2024, was treated with ertapenem on prior May 2024 admission.     Plan:  - meropenem 1g q8 x7 days iso of prior history of ESBL UTI (10/10 - )  - f/u BCx, urine cultures and sensitivities  - q6h bladder scans to r/o retention as cause of recurrent UTIs Pt with history of recurrent UTIs, most recently treated with Macrobid 9/13-17 and started on methenamine for UTI ppx. Pt has +ESBL urine cultures on Jan 2023 and May 2024, was treated with ertapenem on prior May 2024 admission.   UC Klebsiella  Plan:  - meropenem 1g q8 x7 days iso of prior history of ESBL UTI (10/10 - )  - f/u BCx, urine cultures and sensitivities  - q6h bladder scans to r/o retention as cause of recurrent UTIs

## 2024-10-12 NOTE — PROGRESS NOTE ADULT - SUBJECTIVE AND OBJECTIVE BOX
OVERNIGHT EVENTS:    SUBJECTIVE / INTERVAL HPI: Patient seen and examined at bedside.     VITAL SIGNS:  Vital Signs Last 24 Hrs  T(C): 36.5 (12 Oct 2024 05:57), Max: 37 (11 Oct 2024 13:57)  T(F): 97.7 (12 Oct 2024 05:57), Max: 98.6 (11 Oct 2024 13:57)  HR: 87 (12 Oct 2024 05:57) (86 - 93)  BP: 131/85 (12 Oct 2024 05:57) (102/61 - 131/85)  BP(mean): --  RR: 18 (12 Oct 2024 05:57) (17 - 18)  SpO2: 98% (12 Oct 2024 05:57) (98% - 99%)    Parameters below as of 12 Oct 2024 05:57  Patient On (Oxygen Delivery Method): room air      I&O's Summary    11 Oct 2024 07:01  -  12 Oct 2024 07:00  --------------------------------------------------------  IN: 830 mL / OUT: 800 mL / NET: 30 mL        PHYSICAL EXAM:    General: WDWN  HEENT: NC/AT; PERRL, anicteric sclera; MMM  Neck: supple  Cardiovascular: +S1/S2; RRR  Respiratory: CTA B/L; no W/R/R  Gastrointestinal: soft, NT/ND; +BSx4  Extremities: WWP; no edema, clubbing or cyanosis  Vascular: 2+ radial, DP/PT pulses B/L  Neurological: AAOx3; no focal deficits    MEDICATIONS:  MEDICATIONS  (STANDING):  allopurinol 300 milliGRAM(s) Oral every 24 hours  apixaban 5 milliGRAM(s) Oral every 12 hours  atorvastatin 80 milliGRAM(s) Oral every 24 hours  dextrose 5%. 1000 milliLiter(s) (100 mL/Hr) IV Continuous <Continuous>  dextrose 5%. 1000 milliLiter(s) (50 mL/Hr) IV Continuous <Continuous>  dextrose 50% Injectable 25 Gram(s) IV Push once  dextrose 50% Injectable 12.5 Gram(s) IV Push once  dextrose 50% Injectable 25 Gram(s) IV Push once  glucagon  Injectable 1 milliGRAM(s) IntraMuscular once  insulin lispro (ADMELOG) corrective regimen sliding scale   SubCutaneous Before meals and at bedtime  lactated ringers. 1000 milliLiter(s) (100 mL/Hr) IV Continuous <Continuous>  magnesium sulfate  IVPB 4 Gram(s) IV Intermittent once  meropenem  IVPB 1000 milliGRAM(s) IV Intermittent every 8 hours  potassium phosphate IVPB 15 milliMole(s) IV Intermittent once    MEDICATIONS  (PRN):  acetaminophen     Tablet .. 650 milliGRAM(s) Oral every 6 hours PRN Temp greater or equal to 38C (100.4F), Mild Pain (1 - 3)      ALLERGIES:  Allergies    codeine (Unknown)    Intolerances        LABS:                        11.0   7.57  )-----------( 186      ( 12 Oct 2024 05:54 )             34.4     10-12    141  |  110[H]  |  6[L]  ----------------------------<  153[H]  3.6   |  22  |  0.52    Ca    9.9      12 Oct 2024 05:54  Phos  1.8     10-12  Mg     1.5     10-12        Urinalysis Basic - ( 12 Oct 2024 05:54 )    Color: x / Appearance: x / SG: x / pH: x  Gluc: 153 mg/dL / Ketone: x  / Bili: x / Urobili: x   Blood: x / Protein: x / Nitrite: x   Leuk Esterase: x / RBC: x / WBC x   Sq Epi: x / Non Sq Epi: x / Bacteria: x      CAPILLARY BLOOD GLUCOSE      POCT Blood Glucose.: 155 mg/dL (12 Oct 2024 06:05)      RADIOLOGY & ADDITIONAL TESTS: Reviewed.   OVERNIGHT EVENTS: NAEON    SUBJECTIVE / INTERVAL HPI: Patient seen and examined at bedside, no complaints, doing well.    VITAL SIGNS:  Vital Signs Last 24 Hrs  T(C): 36.5 (12 Oct 2024 05:57), Max: 37 (11 Oct 2024 13:57)  T(F): 97.7 (12 Oct 2024 05:57), Max: 98.6 (11 Oct 2024 13:57)  HR: 87 (12 Oct 2024 05:57) (86 - 93)  BP: 131/85 (12 Oct 2024 05:57) (102/61 - 131/85)  BP(mean): --  RR: 18 (12 Oct 2024 05:57) (17 - 18)  SpO2: 98% (12 Oct 2024 05:57) (98% - 99%)    Parameters below as of 12 Oct 2024 05:57  Patient On (Oxygen Delivery Method): room air      I&O's Summary    11 Oct 2024 07:01  -  12 Oct 2024 07:00  --------------------------------------------------------  IN: 830 mL / OUT: 800 mL / NET: 30 mL        PHYSICAL EXAM:    General: WDWN  HEENT: NC/AT; PERRL, anicteric sclera; MMM  Neck: supple  Cardiovascular: +S1/S2; RRR  Respiratory: CTA B/L; no W/R/R  Gastrointestinal: soft, NT/ND; +BSx4  Extremities: WWP; no edema, clubbing or cyanosis  Vascular: 2+ radial, DP/PT pulses B/L  Neurological: AAOx3; no focal deficits    MEDICATIONS:  MEDICATIONS  (STANDING):  allopurinol 300 milliGRAM(s) Oral every 24 hours  apixaban 5 milliGRAM(s) Oral every 12 hours  atorvastatin 80 milliGRAM(s) Oral every 24 hours  dextrose 5%. 1000 milliLiter(s) (100 mL/Hr) IV Continuous <Continuous>  dextrose 5%. 1000 milliLiter(s) (50 mL/Hr) IV Continuous <Continuous>  dextrose 50% Injectable 25 Gram(s) IV Push once  dextrose 50% Injectable 12.5 Gram(s) IV Push once  dextrose 50% Injectable 25 Gram(s) IV Push once  glucagon  Injectable 1 milliGRAM(s) IntraMuscular once  insulin lispro (ADMELOG) corrective regimen sliding scale   SubCutaneous Before meals and at bedtime  lactated ringers. 1000 milliLiter(s) (100 mL/Hr) IV Continuous <Continuous>  magnesium sulfate  IVPB 4 Gram(s) IV Intermittent once  meropenem  IVPB 1000 milliGRAM(s) IV Intermittent every 8 hours  potassium phosphate IVPB 15 milliMole(s) IV Intermittent once    MEDICATIONS  (PRN):  acetaminophen     Tablet .. 650 milliGRAM(s) Oral every 6 hours PRN Temp greater or equal to 38C (100.4F), Mild Pain (1 - 3)      ALLERGIES:  Allergies    codeine (Unknown)    Intolerances        LABS:                        11.0   7.57  )-----------( 186      ( 12 Oct 2024 05:54 )             34.4     10-12    141  |  110[H]  |  6[L]  ----------------------------<  153[H]  3.6   |  22  |  0.52    Ca    9.9      12 Oct 2024 05:54  Phos  1.8     10-12  Mg     1.5     10-12        Urinalysis Basic - ( 12 Oct 2024 05:54 )    Color: x / Appearance: x / SG: x / pH: x  Gluc: 153 mg/dL / Ketone: x  / Bili: x / Urobili: x   Blood: x / Protein: x / Nitrite: x   Leuk Esterase: x / RBC: x / WBC x   Sq Epi: x / Non Sq Epi: x / Bacteria: x      CAPILLARY BLOOD GLUCOSE      POCT Blood Glucose.: 155 mg/dL (12 Oct 2024 06:05)      RADIOLOGY & ADDITIONAL TESTS: Reviewed.

## 2024-10-12 NOTE — PROGRESS NOTE ADULT - PROBLEM SELECTOR PLAN 4
Resolved.  Pt's calcium correction for albumin is 11.2. Prior admission pt had hypercalcium with mildly elevated PTH c/f primary hyperparathyroidism.     PTH elevated 123    Plan:  - r/p CBC, BMP Resolved.  Pt's calcium correction for albumin is 11.2. Prior admission pt had hypercalcium with mildly elevated PTH c/f primary hyperparathyroidism.     PTH elevated 123    Plan:  - resolved

## 2024-10-13 LAB
-  AMPICILLIN/SULBACTAM: SIGNIFICANT CHANGE UP
-  AMPICILLIN: SIGNIFICANT CHANGE UP
-  CEFAZOLIN: SIGNIFICANT CHANGE UP
-  CEFEPIME: SIGNIFICANT CHANGE UP
-  CEFTRIAXONE: SIGNIFICANT CHANGE UP
-  CEFUROXIME: SIGNIFICANT CHANGE UP
-  CIPROFLOXACIN: SIGNIFICANT CHANGE UP
-  ERTAPENEM: SIGNIFICANT CHANGE UP
-  GENTAMICIN: SIGNIFICANT CHANGE UP
-  IMIPENEM: SIGNIFICANT CHANGE UP
-  LEVOFLOXACIN: SIGNIFICANT CHANGE UP
-  MEROPENEM: SIGNIFICANT CHANGE UP
-  NITROFURANTOIN: SIGNIFICANT CHANGE UP
-  PIPERACILLIN/TAZOBACTAM: SIGNIFICANT CHANGE UP
-  TOBRAMYCIN: SIGNIFICANT CHANGE UP
-  TRIMETHOPRIM/SULFAMETHOXAZOLE: SIGNIFICANT CHANGE UP
ANION GAP SERPL CALC-SCNC: 8 MMOL/L — SIGNIFICANT CHANGE UP (ref 5–17)
BASOPHILS # BLD AUTO: 0.05 K/UL — SIGNIFICANT CHANGE UP (ref 0–0.2)
BASOPHILS NFR BLD AUTO: 0.6 % — SIGNIFICANT CHANGE UP (ref 0–2)
BUN SERPL-MCNC: 8 MG/DL — SIGNIFICANT CHANGE UP (ref 7–23)
CALCIUM SERPL-MCNC: 10 MG/DL — SIGNIFICANT CHANGE UP (ref 8.4–10.5)
CHLORIDE SERPL-SCNC: 107 MMOL/L — SIGNIFICANT CHANGE UP (ref 96–108)
CO2 SERPL-SCNC: 24 MMOL/L — SIGNIFICANT CHANGE UP (ref 22–31)
CREAT SERPL-MCNC: 0.59 MG/DL — SIGNIFICANT CHANGE UP (ref 0.5–1.3)
CULTURE RESULTS: ABNORMAL
EGFR: 93 ML/MIN/1.73M2 — SIGNIFICANT CHANGE UP
EOSINOPHIL # BLD AUTO: 0.17 K/UL — SIGNIFICANT CHANGE UP (ref 0–0.5)
EOSINOPHIL NFR BLD AUTO: 1.9 % — SIGNIFICANT CHANGE UP (ref 0–6)
GLUCOSE BLDC GLUCOMTR-MCNC: 149 MG/DL — HIGH (ref 70–99)
GLUCOSE BLDC GLUCOMTR-MCNC: 186 MG/DL — HIGH (ref 70–99)
GLUCOSE BLDC GLUCOMTR-MCNC: 213 MG/DL — HIGH (ref 70–99)
GLUCOSE BLDC GLUCOMTR-MCNC: 237 MG/DL — HIGH (ref 70–99)
GLUCOSE SERPL-MCNC: 145 MG/DL — HIGH (ref 70–99)
HCT VFR BLD CALC: 36.4 % — SIGNIFICANT CHANGE UP (ref 34.5–45)
HGB BLD-MCNC: 11.6 G/DL — SIGNIFICANT CHANGE UP (ref 11.5–15.5)
IMM GRANULOCYTES NFR BLD AUTO: 0.6 % — SIGNIFICANT CHANGE UP (ref 0–0.9)
LYMPHOCYTES # BLD AUTO: 1.9 K/UL — SIGNIFICANT CHANGE UP (ref 1–3.3)
LYMPHOCYTES # BLD AUTO: 21.6 % — SIGNIFICANT CHANGE UP (ref 13–44)
MAGNESIUM SERPL-MCNC: 1.9 MG/DL — SIGNIFICANT CHANGE UP (ref 1.6–2.6)
MCHC RBC-ENTMCNC: 31.3 PG — SIGNIFICANT CHANGE UP (ref 27–34)
MCHC RBC-ENTMCNC: 31.9 GM/DL — LOW (ref 32–36)
MCV RBC AUTO: 98.1 FL — SIGNIFICANT CHANGE UP (ref 80–100)
METHOD TYPE: SIGNIFICANT CHANGE UP
MONOCYTES # BLD AUTO: 0.46 K/UL — SIGNIFICANT CHANGE UP (ref 0–0.9)
MONOCYTES NFR BLD AUTO: 5.2 % — SIGNIFICANT CHANGE UP (ref 2–14)
NEUTROPHILS # BLD AUTO: 6.18 K/UL — SIGNIFICANT CHANGE UP (ref 1.8–7.4)
NEUTROPHILS NFR BLD AUTO: 70.1 % — SIGNIFICANT CHANGE UP (ref 43–77)
NRBC # BLD: 0 /100 WBCS — SIGNIFICANT CHANGE UP (ref 0–0)
ORGANISM # SPEC MICROSCOPIC CNT: ABNORMAL
ORGANISM # SPEC MICROSCOPIC CNT: SIGNIFICANT CHANGE UP
PHOSPHATE SERPL-MCNC: 2 MG/DL — LOW (ref 2.5–4.5)
PLATELET # BLD AUTO: 197 K/UL — SIGNIFICANT CHANGE UP (ref 150–400)
POTASSIUM SERPL-MCNC: 3.6 MMOL/L — SIGNIFICANT CHANGE UP (ref 3.5–5.3)
POTASSIUM SERPL-SCNC: 3.6 MMOL/L — SIGNIFICANT CHANGE UP (ref 3.5–5.3)
RBC # BLD: 3.71 M/UL — LOW (ref 3.8–5.2)
RBC # FLD: 13.3 % — SIGNIFICANT CHANGE UP (ref 10.3–14.5)
SODIUM SERPL-SCNC: 139 MMOL/L — SIGNIFICANT CHANGE UP (ref 135–145)
SPECIMEN SOURCE: SIGNIFICANT CHANGE UP
WBC # BLD: 8.81 K/UL — SIGNIFICANT CHANGE UP (ref 3.8–10.5)
WBC # FLD AUTO: 8.81 K/UL — SIGNIFICANT CHANGE UP (ref 3.8–10.5)

## 2024-10-13 PROCEDURE — 99232 SBSQ HOSP IP/OBS MODERATE 35: CPT | Mod: GC

## 2024-10-13 RX ORDER — POTASSIUM PHOSPHATE, MONOBASIC POTASSIUM PHOSPHATE, DIBASIC 224; 236 MG/ML; MG/ML
15 INJECTION, SOLUTION, CONCENTRATE INTRAVENOUS ONCE
Refills: 0 | Status: COMPLETED | OUTPATIENT
Start: 2024-10-13 | End: 2024-10-13

## 2024-10-13 RX ORDER — MAGNESIUM SULFATE 500 MG/ML
1 VIAL (ML) INJECTION ONCE
Refills: 0 | Status: COMPLETED | OUTPATIENT
Start: 2024-10-13 | End: 2024-10-13

## 2024-10-13 RX ADMIN — Medication 1: at 09:44

## 2024-10-13 RX ADMIN — POTASSIUM PHOSPHATE, MONOBASIC POTASSIUM PHOSPHATE, DIBASIC 62.5 MILLIMOLE(S): 224; 236 INJECTION, SOLUTION, CONCENTRATE INTRAVENOUS at 18:34

## 2024-10-13 RX ADMIN — MEROPENEM 100 MILLIGRAM(S): 500 INJECTION INTRAVENOUS at 09:45

## 2024-10-13 RX ADMIN — MEROPENEM 100 MILLIGRAM(S): 500 INJECTION INTRAVENOUS at 01:10

## 2024-10-13 RX ADMIN — Medication 100 GRAM(S): at 13:29

## 2024-10-13 RX ADMIN — Medication 2: at 13:30

## 2024-10-13 RX ADMIN — ATORVASTATIN CALCIUM 80 MILLIGRAM(S): 10 TABLET, FILM COATED ORAL at 18:35

## 2024-10-13 RX ADMIN — Medication 2: at 23:59

## 2024-10-13 RX ADMIN — Medication 300 MILLIGRAM(S): at 18:35

## 2024-10-13 RX ADMIN — APIXABAN 5 MILLIGRAM(S): 5 TABLET, FILM COATED ORAL at 07:07

## 2024-10-13 RX ADMIN — APIXABAN 5 MILLIGRAM(S): 5 TABLET, FILM COATED ORAL at 18:35

## 2024-10-13 RX ADMIN — Medication 1 TABLET(S): at 13:30

## 2024-10-13 NOTE — PROGRESS NOTE ADULT - PROBLEM SELECTOR PLAN 7
Continue home atorvastatin 80mg QD

## 2024-10-13 NOTE — PROGRESS NOTE ADULT - PROBLEM SELECTOR PLAN 1
Pt presenting with 2-3 fevers/chills at home, WBC 15.91, lactate 2.8, severe sepsis likely 2/2 UTI and diarrhea. Lactate decrease to 1.8 after receiving 1.5L NS bolus.    Plan as below

## 2024-10-13 NOTE — PROGRESS NOTE ADULT - PROBLEM SELECTOR PLAN 2
Pt with history of recurrent UTIs, most recently treated with Macrobid 9/13-17 and started on methenamine for UTI ppx. Pt has +ESBL urine cultures on Jan 2023 and May 2024, was treated with ertapenem on prior May 2024 admission.   UC Klebsiella  Plan:  - meropenem 1g q8 x7 days iso of prior history of ESBL UTI (10/10 - )  - f/u BCx, urine culture with Klebsiella, still pending sensitivities   - q6h bladder scans to r/o retention as cause of recurrent UTIs->no evidence of retention currently

## 2024-10-13 NOTE — PROGRESS NOTE ADULT - PROBLEM SELECTOR PLAN 9
Continue home allopurinol 300mg QD

## 2024-10-13 NOTE — PROGRESS NOTE ADULT - PROBLEM SELECTOR PLAN 3
Pt reporting 2-3 days of 3-4x/day soft stools w/ LLQ abdominal pain and recent antibx use and possible URI based on self-reported symptoms   RVP neg.    Plan:  - f/u GI PCR and Cdiff  - monitor stool counts
Pt reporting 2-3 days of 3-4x/day soft stools w/ LLQ abdominal pain and recent antibx use and possible URI based on self-reported symptoms   RVP neg.    Plan:  - f/u GI PCR and Cdiff  - monitor stool counts
Pt reporting 2-3 days of 3-4x/day soft stools w/ LLQ abdominal pain and recent antibx use and possible URI based on self-reported symptoms   RVP neg.    Plan:  - monitor stool counts (diarrhea resolving currently)

## 2024-10-13 NOTE — PROGRESS NOTE ADULT - PROBLEM SELECTOR PLAN 10
F: Tolerating oral  E: Replete lytes PRN  GI: None  Diet: Regular  DVT: eliquis 5mg BID  Code: DNR/DNI

## 2024-10-13 NOTE — PROGRESS NOTE ADULT - ASSESSMENT
74F w/ PMH of Afib on Eliquis, HLD, DM2, GERD, gout, and hx of recurrent UTIs recently treated for ESBL+ UTI w/ ertapenem (5/2024), who presents to the hospital with 2-3 days of dysuria, fevers/chills, diarrhea, increased confusion found to meet severe sepsis, admitted to [facility] for management of severe sepsis iso UTI and diarrhea.  
74F w/ PMH of Afib on Eliquis, HLD, DM2, GERD, gout, and hx of recurrent UTIs recently treated for ESBL+ UTI w/ ertapenem (5/2024), who presents to the hospital with 2-3 days of dysuria, fevers/chills, diarrhea, increased confusion found to meet severe sepsis, admitted to [facility] for management of severe sepsis iso UTI and diarrhea.  
74F w/ PMH of Afib on Eliquis, HLD, DM2, GERD, gout, and hx of recurrent UTIs recently treated for ESBL+ UTI w/ ertapenem (5/2024), who presents to the hospital with 2-3 days of dysuria, fevers/chills, diarrhea, increased confusion found to meet severe sepsis, admitted to [facility] for management of severe sepsis iso UTI and diarrhea.

## 2024-10-13 NOTE — PROGRESS NOTE ADULT - TIME BILLING
Bedside exam and interview   Reviewed vitals, labs   Discussed patient's plan of care with housestaff   Documentation of encounter  Excludes teaching and separately reported services
Bedside exam and interview   Reviewed vitals, labs   Discussed patient's plan of care with housestaff   Documentation of encounter  Excludes teaching and separately reported services
Bedside exam and interview   Reviewed vitals, labs, imaging    Discussed patient's plan of care with housestaff   Documentation of encounter

## 2024-10-13 NOTE — PROGRESS NOTE ADULT - SUBJECTIVE AND OBJECTIVE BOX
Medicine Progress Note    Patient is a 74y old  Female who presents with a chief complaint of diarrhea and dysuria (12 Oct 2024 08:04)      SUBJECTIVE / OVERNIGHT EVENTS:  Feels well this morning, watching TV. Not in any pain, no new symptoms. Able to sleep well overnight.     MEDICATIONS  (STANDING):  allopurinol 300 milliGRAM(s) Oral every 24 hours  apixaban 5 milliGRAM(s) Oral every 12 hours  atorvastatin 80 milliGRAM(s) Oral every 24 hours  dextrose 5%. 1000 milliLiter(s) (50 mL/Hr) IV Continuous <Continuous>  dextrose 5%. 1000 milliLiter(s) (100 mL/Hr) IV Continuous <Continuous>  dextrose 50% Injectable 25 Gram(s) IV Push once  dextrose 50% Injectable 25 Gram(s) IV Push once  dextrose 50% Injectable 12.5 Gram(s) IV Push once  glucagon  Injectable 1 milliGRAM(s) IntraMuscular once  insulin lispro (ADMELOG) corrective regimen sliding scale   SubCutaneous Before meals and at bedtime  lactated ringers. 1000 milliLiter(s) (100 mL/Hr) IV Continuous <Continuous>  magnesium sulfate  IVPB 1 Gram(s) IV Intermittent once  meropenem  IVPB 1000 milliGRAM(s) IV Intermittent every 8 hours  potassium phosphate IVPB 15 milliMole(s) IV Intermittent once    MEDICATIONS  (PRN):  acetaminophen     Tablet .. 650 milliGRAM(s) Oral every 6 hours PRN Temp greater or equal to 38C (100.4F), Mild Pain (1 - 3)    CAPILLARY BLOOD GLUCOSE      POCT Blood Glucose.: 186 mg/dL (13 Oct 2024 09:12)  POCT Blood Glucose.: 188 mg/dL (12 Oct 2024 21:50)  POCT Blood Glucose.: 173 mg/dL (12 Oct 2024 17:51)  POCT Blood Glucose.: 193 mg/dL (12 Oct 2024 13:32)    I&O's Summary    12 Oct 2024 07:01  -  13 Oct 2024 07:00  --------------------------------------------------------  IN: 100 mL / OUT: 900 mL / NET: -800 mL        PHYSICAL EXAM:  Vital Signs Last 24 Hrs  T(C): 36.5 (13 Oct 2024 06:27), Max: 36.7 (12 Oct 2024 21:04)  T(F): 97.7 (13 Oct 2024 06:27), Max: 98 (12 Oct 2024 21:04)  HR: 80 (13 Oct 2024 06:27) (80 - 88)  BP: 123/77 (13 Oct 2024 06:27) (123/77 - 131/82)  BP(mean): 97 (12 Oct 2024 21:04) (97 - 97)  RR: 18 (13 Oct 2024 06:27) (18 - 18)  SpO2: 100% (13 Oct 2024 06:27) (97% - 100%)    Parameters below as of 13 Oct 2024 06:27  Patient On (Oxygen Delivery Method): room air    EXAM:   Appears comfortable   MMM  Normal WOB on RA, CTAB   RRR, no mrg   Abdomen soft, nontender, nondistended  Extremities warm and without edema   AOX3, no focal neuro deficits     LABS:                        11.6   8.81  )-----------( 197      ( 13 Oct 2024 05:30 )             36.4     10-13    139  |  107  |  8   ----------------------------<  145[H]  3.6   |  24  |  0.59    Ca    10.0      13 Oct 2024 05:30  Phos  2.0     10-13  Mg     1.9     10-13            Urinalysis Basic - ( 13 Oct 2024 05:30 )    Color: x / Appearance: x / SG: x / pH: x  Gluc: 145 mg/dL / Ketone: x  / Bili: x / Urobili: x   Blood: x / Protein: x / Nitrite: x   Leuk Esterase: x / RBC: x / WBC x   Sq Epi: x / Non Sq Epi: x / Bacteria: x        Culture - Blood (collected 10 Oct 2024 20:25)  Source: .Blood BLOOD  Preliminary Report (13 Oct 2024 04:01):    No growth at 48 Hours    Culture - Blood (collected 10 Oct 2024 20:15)  Source: .Blood BLOOD  Preliminary Report (13 Oct 2024 04:01):    No growth at 48 Hours      SARS-CoV-2: NotDetec (10 Oct 2024 14:44)  SARS-CoV-2: Carol (02 May 2024 17:47)      RADIOLOGY & ADDITIONAL TESTS:  Imaging from Last 24 Hours:  None new

## 2024-10-13 NOTE — PROGRESS NOTE ADULT - PROBLEM SELECTOR PROBLEM 10
well developed, well nourished , in no acute distress , ambulating without difficulty , normal communication ability
Prophylactic measure

## 2024-10-13 NOTE — PROGRESS NOTE ADULT - PROBLEM SELECTOR PLAN 4
Resolved.  Pt's calcium correction for albumin is 11.2. Prior admission pt had hypercalcium with mildly elevated PTH c/f primary hyperparathyroidism.     PTH elevated 123    Plan:  - resolved

## 2024-10-13 NOTE — PROGRESS NOTE ADULT - PROBLEM SELECTOR PLAN 5
Pt with history of Afib on Eliquis 5mg BID. Pt is not reporting hematuria or bloody stools, no other signs of acute bleeding    Plan:  - continue eliquis 5mg BID

## 2024-10-14 ENCOUNTER — TRANSCRIPTION ENCOUNTER (OUTPATIENT)
Age: 74
End: 2024-10-14

## 2024-10-14 VITALS
TEMPERATURE: 98 F | DIASTOLIC BLOOD PRESSURE: 86 MMHG | OXYGEN SATURATION: 97 % | SYSTOLIC BLOOD PRESSURE: 122 MMHG | HEART RATE: 97 BPM | RESPIRATION RATE: 18 BRPM

## 2024-10-14 LAB
ANION GAP SERPL CALC-SCNC: 7 MMOL/L — SIGNIFICANT CHANGE UP (ref 5–17)
BUN SERPL-MCNC: 7 MG/DL — SIGNIFICANT CHANGE UP (ref 7–23)
CALCIUM SERPL-MCNC: 10.2 MG/DL — SIGNIFICANT CHANGE UP (ref 8.4–10.5)
CHLORIDE SERPL-SCNC: 107 MMOL/L — SIGNIFICANT CHANGE UP (ref 96–108)
CO2 SERPL-SCNC: 27 MMOL/L — SIGNIFICANT CHANGE UP (ref 22–31)
CREAT SERPL-MCNC: 0.71 MG/DL — SIGNIFICANT CHANGE UP (ref 0.5–1.3)
EGFR: 88 ML/MIN/1.73M2 — SIGNIFICANT CHANGE UP
GLUCOSE BLDC GLUCOMTR-MCNC: 124 MG/DL — HIGH (ref 70–99)
GLUCOSE BLDC GLUCOMTR-MCNC: 242 MG/DL — HIGH (ref 70–99)
GLUCOSE SERPL-MCNC: 150 MG/DL — HIGH (ref 70–99)
HCT VFR BLD CALC: 39.5 % — SIGNIFICANT CHANGE UP (ref 34.5–45)
HGB BLD-MCNC: 12 G/DL — SIGNIFICANT CHANGE UP (ref 11.5–15.5)
MAGNESIUM SERPL-MCNC: 2.3 MG/DL — SIGNIFICANT CHANGE UP (ref 1.6–2.6)
MCHC RBC-ENTMCNC: 29.7 PG — SIGNIFICANT CHANGE UP (ref 27–34)
MCHC RBC-ENTMCNC: 30.4 GM/DL — LOW (ref 32–36)
MCV RBC AUTO: 97.8 FL — SIGNIFICANT CHANGE UP (ref 80–100)
NRBC # BLD: 0 /100 WBCS — SIGNIFICANT CHANGE UP (ref 0–0)
PHOSPHATE SERPL-MCNC: 2.8 MG/DL — SIGNIFICANT CHANGE UP (ref 2.5–4.5)
PLATELET # BLD AUTO: 206 K/UL — SIGNIFICANT CHANGE UP (ref 150–400)
POTASSIUM SERPL-MCNC: 3.8 MMOL/L — SIGNIFICANT CHANGE UP (ref 3.5–5.3)
POTASSIUM SERPL-SCNC: 3.8 MMOL/L — SIGNIFICANT CHANGE UP (ref 3.5–5.3)
RBC # BLD: 4.04 M/UL — SIGNIFICANT CHANGE UP (ref 3.8–5.2)
RBC # FLD: 13.5 % — SIGNIFICANT CHANGE UP (ref 10.3–14.5)
SODIUM SERPL-SCNC: 141 MMOL/L — SIGNIFICANT CHANGE UP (ref 135–145)
WBC # BLD: 10.64 K/UL — HIGH (ref 3.8–10.5)
WBC # FLD AUTO: 10.64 K/UL — HIGH (ref 3.8–10.5)

## 2024-10-14 PROCEDURE — 99239 HOSP IP/OBS DSCHRG MGMT >30: CPT

## 2024-10-14 RX ORDER — SENNOSIDES 8.6 MG
1 TABLET ORAL ONCE
Refills: 0 | Status: COMPLETED | OUTPATIENT
Start: 2024-10-14 | End: 2024-10-14

## 2024-10-14 RX ADMIN — Medication 17 GRAM(S): at 09:11

## 2024-10-14 RX ADMIN — APIXABAN 5 MILLIGRAM(S): 5 TABLET, FILM COATED ORAL at 18:06

## 2024-10-14 RX ADMIN — Medication 2: at 14:00

## 2024-10-14 RX ADMIN — Medication 1 TABLET(S): at 09:11

## 2024-10-14 RX ADMIN — APIXABAN 5 MILLIGRAM(S): 5 TABLET, FILM COATED ORAL at 05:49

## 2024-10-14 RX ADMIN — Medication 300 MILLIGRAM(S): at 18:06

## 2024-10-14 RX ADMIN — Medication 1 TABLET(S): at 01:15

## 2024-10-14 RX ADMIN — Medication 1 TABLET(S): at 13:59

## 2024-10-14 RX ADMIN — ATORVASTATIN CALCIUM 80 MILLIGRAM(S): 10 TABLET, FILM COATED ORAL at 18:06

## 2024-10-14 NOTE — DISCHARGE NOTE NURSING/CASE MANAGEMENT/SOCIAL WORK - NSDCPEFALRISK_GEN_ALL_CORE
For information on Fall & Injury Prevention, visit: [email or web address] OR  [email or web address] OR  https://www.cdc.gov/steadi/patient.html

## 2024-10-14 NOTE — DISCHARGE NOTE NURSING/CASE MANAGEMENT/SOCIAL WORK - PATIENT PORTAL LINK FT
You can access the FollowMyHealth Patient Portal offered by [facility] by registering at the following website: [email or web address]. By joining [facility]’s FollowMyHealth portal, you will also be able to view your health information using other applications (apps) compatible with our system.

## 2024-10-20 LAB — PTH RELATED PROT SERPL-MCNC: <2 PMOL/L — SIGNIFICANT CHANGE UP

## 2024-10-24 DIAGNOSIS — M10.9 GOUT, UNSPECIFIED: ICD-10-CM

## 2024-10-24 DIAGNOSIS — Z88.5 ALLERGY STATUS TO NARCOTIC AGENT: ICD-10-CM

## 2024-10-24 DIAGNOSIS — R19.7 DIARRHEA, UNSPECIFIED: ICD-10-CM

## 2024-10-24 DIAGNOSIS — E88.09 OTHER DISORDERS OF PLASMA-PROTEIN METABOLISM, NOT ELSEWHERE CLASSIFIED: ICD-10-CM

## 2024-10-24 DIAGNOSIS — Z79.84 LONG TERM (CURRENT) USE OF ORAL HYPOGLYCEMIC DRUGS: ICD-10-CM

## 2024-10-24 DIAGNOSIS — R53.2 FUNCTIONAL QUADRIPLEGIA: ICD-10-CM

## 2024-10-24 DIAGNOSIS — E11.9 TYPE 2 DIABETES MELLITUS WITHOUT COMPLICATIONS: ICD-10-CM

## 2024-10-24 DIAGNOSIS — N20.0 CALCULUS OF KIDNEY: ICD-10-CM

## 2024-10-24 DIAGNOSIS — E21.3 HYPERPARATHYROIDISM, UNSPECIFIED: ICD-10-CM

## 2024-10-24 DIAGNOSIS — A41.9 SEPSIS, UNSPECIFIED ORGANISM: ICD-10-CM

## 2024-10-24 DIAGNOSIS — Z66 DO NOT RESUSCITATE: ICD-10-CM

## 2024-10-24 DIAGNOSIS — E78.5 HYPERLIPIDEMIA, UNSPECIFIED: ICD-10-CM

## 2024-10-24 DIAGNOSIS — N39.0 URINARY TRACT INFECTION, SITE NOT SPECIFIED: ICD-10-CM

## 2024-10-24 DIAGNOSIS — K80.20 CALCULUS OF GALLBLADDER WITHOUT CHOLECYSTITIS WITHOUT OBSTRUCTION: ICD-10-CM

## 2024-10-24 DIAGNOSIS — Z79.624 LONG TERM (CURRENT) USE OF INHIBITORS OF NUCLEOTIDE SYNTHESIS: ICD-10-CM

## 2024-10-24 DIAGNOSIS — R65.20 SEVERE SEPSIS WITHOUT SEPTIC SHOCK: ICD-10-CM

## 2024-10-24 DIAGNOSIS — Z79.01 LONG TERM (CURRENT) USE OF ANTICOAGULANTS: ICD-10-CM

## 2024-10-24 DIAGNOSIS — I48.20 CHRONIC ATRIAL FIBRILLATION, UNSPECIFIED: ICD-10-CM

## 2024-10-24 DIAGNOSIS — K21.9 GASTRO-ESOPHAGEAL REFLUX DISEASE WITHOUT ESOPHAGITIS: ICD-10-CM

## 2024-10-24 DIAGNOSIS — Z11.52 ENCOUNTER FOR SCREENING FOR COVID-19: ICD-10-CM

## 2024-10-24 DIAGNOSIS — R53.1 WEAKNESS: ICD-10-CM

## 2024-11-26 PROCEDURE — 84484 ASSAY OF TROPONIN QUANT: CPT

## 2024-11-26 PROCEDURE — 81001 URINALYSIS AUTO W/SCOPE: CPT

## 2024-11-26 PROCEDURE — 99285 EMERGENCY DEPT VISIT HI MDM: CPT | Mod: 25

## 2024-11-26 PROCEDURE — 83036 HEMOGLOBIN GLYCOSYLATED A1C: CPT

## 2024-11-26 PROCEDURE — 96374 THER/PROPH/DIAG INJ IV PUSH: CPT

## 2024-11-26 PROCEDURE — 85027 COMPLETE CBC AUTOMATED: CPT

## 2024-11-26 PROCEDURE — 82553 CREATINE MB FRACTION: CPT

## 2024-11-26 PROCEDURE — 82550 ASSAY OF CK (CPK): CPT

## 2024-11-26 PROCEDURE — 80053 COMPREHEN METABOLIC PANEL: CPT

## 2024-11-26 PROCEDURE — 83735 ASSAY OF MAGNESIUM: CPT

## 2024-11-26 PROCEDURE — 0225U NFCT DS DNA&RNA 21 SARSCOV2: CPT

## 2024-11-26 PROCEDURE — 85610 PROTHROMBIN TIME: CPT

## 2024-11-26 PROCEDURE — 85730 THROMBOPLASTIN TIME PARTIAL: CPT

## 2024-11-26 PROCEDURE — 71250 CT THORAX DX C-: CPT | Mod: MC

## 2024-11-26 PROCEDURE — 85025 COMPLETE CBC W/AUTO DIFF WBC: CPT

## 2024-11-26 PROCEDURE — 83605 ASSAY OF LACTIC ACID: CPT

## 2024-11-26 PROCEDURE — 87086 URINE CULTURE/COLONY COUNT: CPT

## 2024-11-26 PROCEDURE — 87040 BLOOD CULTURE FOR BACTERIA: CPT

## 2024-11-26 PROCEDURE — 87186 SC STD MICRODIL/AGAR DIL: CPT

## 2024-11-26 PROCEDURE — 82150 ASSAY OF AMYLASE: CPT

## 2024-11-26 PROCEDURE — 82310 ASSAY OF CALCIUM: CPT

## 2024-11-26 PROCEDURE — 87077 CULTURE AEROBIC IDENTIFY: CPT

## 2024-11-26 PROCEDURE — 96375 TX/PRO/DX INJ NEW DRUG ADDON: CPT

## 2024-11-26 PROCEDURE — 83690 ASSAY OF LIPASE: CPT

## 2024-11-26 PROCEDURE — 80048 BASIC METABOLIC PNL TOTAL CA: CPT

## 2024-11-26 PROCEDURE — 96376 TX/PRO/DX INJ SAME DRUG ADON: CPT

## 2024-11-26 PROCEDURE — 83519 RIA NONANTIBODY: CPT

## 2024-11-26 PROCEDURE — 82962 GLUCOSE BLOOD TEST: CPT

## 2024-11-26 PROCEDURE — 83970 ASSAY OF PARATHORMONE: CPT

## 2024-11-26 PROCEDURE — 36415 COLL VENOUS BLD VENIPUNCTURE: CPT

## 2024-11-26 PROCEDURE — 83880 ASSAY OF NATRIURETIC PEPTIDE: CPT

## 2024-11-26 PROCEDURE — 74176 CT ABD & PELVIS W/O CONTRAST: CPT | Mod: MC

## 2024-11-26 PROCEDURE — 84100 ASSAY OF PHOSPHORUS: CPT

## 2025-05-29 NOTE — PROGRESS NOTE ADULT - PROBLEM/PLAN-1
Daily Note     Today's date: 2025  Patient name: Jamee Shaikh  : 1959  MRN: [id number]  Referring provider: Abudllahi Cortez*  Dx:   Encounter Diagnosis     ICD-10-CM    1. Tear of left supraspinatus tendon  M75.102       2. Left shoulder pain, unspecified chronicity  M25.512           Start Time: 0900  Stop Time: 0940  Total time in clinic (min): 40 minutes    Subjective: No new complaints.       Objective: See treatment diary below      Assessment: Tolerated treatment well. Pain today with active abd. Needs to continue to progress end range of motion as well as strength for full return to PLOF. May begin working on CKC exercises to help with progress and stability. Patient exhibited good technique with therapeutic exercises and would benefit from continued PT      Plan: Continue per plan of care.  Progress treatment as tolerated.  Progress note next session.      Precautions: s/p protocol attached to chart  Access Code: [email or web address]  Progress note:  POC:     Manuals 5/15 5/20 5/22 5/29 5/13   Stretching with active release KB per protocol KB per protocol KB per protocol KB per protocol KB per protocol   GH jt mobs        Scapular PNF                Neuro Re-Ed        UBE 5 min seated L1 6 min L1 seated alt 6 min L1 seated alt 6 min L1 seated alt 5 min seated L1   scap retraction 10x 10x 10x 10x 10x   Shoulder rolls 10x 10x 10x 10x 10x   Chin tucks 10x 10x 10x 10x 10x   Scapular iso :05x5 ea :05x5 ea :05x5 ea :05x5 ea :05x5   TB mtp/LTP        Ther Ex        pulleys 10x 10x 10x 10x 10x   UT stretch        LS stretch        Wrist AROM 1# 20x ea 1# 30x ea 1# 30x ea prn     20x green 30x green 30x green  20x green   Elbow PROM AROM 30x AROM 30x AROM 30x AROM 30x AROM 30x   Table slide 10x flex, scap 10x flex, scap 10x flex, scap GSB 10x ea 10x flex, scap   Supine flexion AAROM :05x10 and ER :05x10 and ER :05x10 and ER :05x10 and ER :05x10 and ER   Wall walk flex, scap 5x ea 5x ea 5x ea 5x 
ea 5x ea   Standing I, Y,T  5x ea 5x ea 5x ea 5x ea   Standing AAROM Cane with mirror  Flex: 10x  Scap:10x Cane with mirror  Flex: 15x  Scap:15x Cane with mirror  Flex: 15x  Scap:15x Cane with mirror  Flex: 15x  Scap:15x Cane with mirror  Flex: 10x  Scap:10x   Standing AROM  Full can  Abd to 90      10x  1x pain    SL ER 10x 10x 15x 15x nv                                                           Ther Activity                        Gait Training                        Modalities                                           
DISPLAY PLAN FREE TEXT

## 2025-06-10 ENCOUNTER — INPATIENT (INPATIENT)
Facility: HOSPITAL | Age: 75
LOS: 1 days | Discharge: HOME HEALTH | DRG: 372 | End: 2025-06-12
Attending: HOSPITALIST | Admitting: STUDENT IN AN ORGANIZED HEALTH CARE EDUCATION/TRAINING PROGRAM
Payer: MEDICARE

## 2025-06-10 VITALS
DIASTOLIC BLOOD PRESSURE: 80 MMHG | SYSTOLIC BLOOD PRESSURE: 155 MMHG | RESPIRATION RATE: 16 BRPM | HEART RATE: 85 BPM | OXYGEN SATURATION: 96 % | TEMPERATURE: 98 F

## 2025-06-10 LAB
ALBUMIN SERPL ELPH-MCNC: 2.7 G/DL — LOW (ref 3.4–5)
ALP SERPL-CCNC: 125 U/L — HIGH (ref 40–120)
ALT FLD-CCNC: 17 U/L — SIGNIFICANT CHANGE UP (ref 12–42)
ANION GAP SERPL CALC-SCNC: 6 MMOL/L — LOW (ref 9–16)
AST SERPL-CCNC: 27 U/L — SIGNIFICANT CHANGE UP (ref 15–37)
BASOPHILS # BLD AUTO: 0.05 K/UL — SIGNIFICANT CHANGE UP (ref 0–0.2)
BASOPHILS NFR BLD AUTO: 0.7 % — SIGNIFICANT CHANGE UP (ref 0–2)
BILIRUB SERPL-MCNC: 0.5 MG/DL — SIGNIFICANT CHANGE UP (ref 0.2–1.2)
BUN SERPL-MCNC: 12 MG/DL — SIGNIFICANT CHANGE UP (ref 7–23)
CALCIUM SERPL-MCNC: 11 MG/DL — HIGH (ref 8.5–10.5)
CHLORIDE SERPL-SCNC: 106 MMOL/L — SIGNIFICANT CHANGE UP (ref 96–108)
CO2 SERPL-SCNC: 30 MMOL/L — SIGNIFICANT CHANGE UP (ref 22–31)
CREAT SERPL-MCNC: 0.94 MG/DL — SIGNIFICANT CHANGE UP (ref 0.5–1.3)
EGFR: 63 ML/MIN/1.73M2 — SIGNIFICANT CHANGE UP
EGFR: 63 ML/MIN/1.73M2 — SIGNIFICANT CHANGE UP
EOSINOPHIL # BLD AUTO: 0.12 K/UL — SIGNIFICANT CHANGE UP (ref 0–0.5)
EOSINOPHIL NFR BLD AUTO: 1.7 % — SIGNIFICANT CHANGE UP (ref 0–6)
GLUCOSE SERPL-MCNC: 167 MG/DL — HIGH (ref 70–99)
HCT VFR BLD CALC: 43.5 % — SIGNIFICANT CHANGE UP (ref 34.5–45)
HGB BLD-MCNC: 13.5 G/DL — SIGNIFICANT CHANGE UP (ref 11.5–15.5)
IMM GRANULOCYTES # BLD AUTO: 0.01 K/UL — SIGNIFICANT CHANGE UP (ref 0–0.07)
IMM GRANULOCYTES NFR BLD AUTO: 0.1 % — SIGNIFICANT CHANGE UP (ref 0–0.9)
LACTATE BLDV-MCNC: 3.1 MMOL/L — HIGH (ref 0.5–2)
LACTATE BLDV-MCNC: 3.2 MMOL/L — HIGH (ref 0.5–2)
LACTATE BLDV-MCNC: 3.5 MMOL/L — HIGH (ref 0.5–2)
LYMPHOCYTES # BLD AUTO: 1.69 K/UL — SIGNIFICANT CHANGE UP (ref 1–3.3)
LYMPHOCYTES NFR BLD AUTO: 23.6 % — SIGNIFICANT CHANGE UP (ref 13–44)
MCHC RBC-ENTMCNC: 31 G/DL — LOW (ref 32–36)
MCHC RBC-ENTMCNC: 31.6 PG — SIGNIFICANT CHANGE UP (ref 27–34)
MCV RBC AUTO: 101.9 FL — HIGH (ref 80–100)
MONOCYTES # BLD AUTO: 0.37 K/UL — SIGNIFICANT CHANGE UP (ref 0–0.9)
MONOCYTES NFR BLD AUTO: 5.2 % — SIGNIFICANT CHANGE UP (ref 2–14)
NEUTROPHILS # BLD AUTO: 4.93 K/UL — SIGNIFICANT CHANGE UP (ref 1.8–7.4)
NEUTROPHILS NFR BLD AUTO: 68.7 % — SIGNIFICANT CHANGE UP (ref 43–77)
NRBC # BLD AUTO: 0 K/UL — SIGNIFICANT CHANGE UP (ref 0–0)
NRBC # FLD: 0 K/UL — SIGNIFICANT CHANGE UP (ref 0–0)
NRBC BLD AUTO-RTO: 0 /100 WBCS — SIGNIFICANT CHANGE UP (ref 0–0)
PLATELET # BLD AUTO: 155 K/UL — SIGNIFICANT CHANGE UP (ref 150–400)
PMV BLD: 12.2 FL — SIGNIFICANT CHANGE UP (ref 7–13)
POTASSIUM SERPL-MCNC: 3.7 MMOL/L — SIGNIFICANT CHANGE UP (ref 3.5–5.3)
POTASSIUM SERPL-SCNC: 3.7 MMOL/L — SIGNIFICANT CHANGE UP (ref 3.5–5.3)
PROT SERPL-MCNC: 5.8 G/DL — LOW (ref 6.4–8.2)
RBC # BLD: 4.27 M/UL — SIGNIFICANT CHANGE UP (ref 3.8–5.2)
RBC # FLD: 13.2 % — SIGNIFICANT CHANGE UP (ref 10.3–14.5)
SODIUM SERPL-SCNC: 142 MMOL/L — SIGNIFICANT CHANGE UP (ref 132–145)
WBC # BLD: 7.17 K/UL — SIGNIFICANT CHANGE UP (ref 3.8–10.5)
WBC # FLD AUTO: 7.17 K/UL — SIGNIFICANT CHANGE UP (ref 3.8–10.5)

## 2025-06-10 PROCEDURE — 99285 EMERGENCY DEPT VISIT HI MDM: CPT | Mod: FS

## 2025-06-10 PROCEDURE — 74177 CT ABD & PELVIS W/CONTRAST: CPT | Mod: 26

## 2025-06-10 RX ORDER — CEFTRIAXONE 500 MG/1
1000 INJECTION, POWDER, FOR SOLUTION INTRAMUSCULAR; INTRAVENOUS ONCE
Refills: 0 | Status: COMPLETED | OUTPATIENT
Start: 2025-06-10 | End: 2025-06-10

## 2025-06-10 RX ORDER — METRONIDAZOLE 250 MG
500 TABLET ORAL ONCE
Refills: 0 | Status: COMPLETED | OUTPATIENT
Start: 2025-06-10 | End: 2025-06-10

## 2025-06-10 RX ORDER — SODIUM CHLORIDE 9 G/1000ML
1000 INJECTION, SOLUTION INTRAVENOUS ONCE
Refills: 0 | Status: COMPLETED | OUTPATIENT
Start: 2025-06-10 | End: 2025-06-10

## 2025-06-10 RX ADMIN — SODIUM CHLORIDE 1000 MILLILITER(S): 9 INJECTION, SOLUTION INTRAVENOUS at 22:02

## 2025-06-10 RX ADMIN — Medication 1000 MILLILITER(S): at 11:12

## 2025-06-10 RX ADMIN — CEFTRIAXONE 100 MILLIGRAM(S): 500 INJECTION, POWDER, FOR SOLUTION INTRAMUSCULAR; INTRAVENOUS at 16:35

## 2025-06-10 RX ADMIN — Medication 100 MILLIGRAM(S): at 17:14

## 2025-06-10 NOTE — ED PROVIDER NOTE - CLINICAL SUMMARY MEDICAL DECISION MAKING FREE TEXT BOX
73yo F here with complaint of diarrhea with low abd cramping, nausea. Possible viral gastroenteritis. Based on the above history and exam findings, do not suspect acute surgical abdomen at this time. Also considering diverticulitis vs colitis vs appendicitis. Will obtain labs to assess for leukocytosis, electrolyte derangement, kidney/liver dysfunction. Patient overall well appearing, vitals reassuring.

## 2025-06-10 NOTE — ED PROVIDER NOTE - NS ED ROS FT
Denies fevers, chills, vomiting, urinary symptoms, chest pain, shortness of breath, syncope/near syncope, cough/URI symptoms, headache, weakness, numbness, gait or balance changes, dizziness

## 2025-06-10 NOTE — ED PROVIDER NOTE - OBJECTIVE STATEMENT
75yo F with PMHx of Afib on eliquis, DM2, HLD, GERD, bedbound, here with complaint 75yo F with PMHx of Afib on eliquis, DM2, HLD, GERD, bedbound, here with complaint of diarrhea x 2-3 days. Reports 2-3 episodes a day with associated low abdominal discomfort and nausea. Denies blood in stool. States this has happened before but self resolved. Denies fever, chills, CP, SOB, vomiting, urinary symptoms. Denies recent abx use

## 2025-06-10 NOTE — ED ADULT NURSE NOTE - NSFALLHARMRISKINTERV_ED_ALL_ED
Assistance OOB with selected safe patient handling equipment if applicable/Assistance with ambulation/Communicate risk of Fall with Harm to all staff, patient, and family/Monitor gait and stability/Provide visual cue: red socks, yellow wristband, yellow gown, etc/Reinforce activity limits and safety measures with patient and family/Toileting schedule using arm’s reach rule for commode and bathroom/Bed in lowest position, wheels locked, appropriate side rails in place/Call bell, personal items and telephone in reach/Instruct patient to call for assistance before getting out of bed/chair/stretcher/Non-slip footwear applied when patient is off stretcher/[place] to call system/Physically safe environment - no spills, clutter or unnecessary equipment/Purposeful Proactive Rounding/Room/bathroom lighting operational, light cord in reach

## 2025-06-10 NOTE — ED ADULT NURSE NOTE - NS ED NURSE REPORT GIVEN TO FT
Pt to f/u with MD 3/28/19  RX approved. Refill sent to listed pharmacy per protocol.      Ninfa ALARCON

## 2025-06-10 NOTE — ED PROVIDER NOTE - ATTENDING APP SHARED VISIT CONTRIBUTION OF CARE
Patient's HHA reports diarrhea for 2-3 days, one episode today. Positive nausea and mild lower abdominal pain. No blood in stool, fever, cp, sob, vomiting, diarrhea.    Gen: NAD. HEENT: NCAT, mmm   Chest: RRR, nl S1 and S2, no m/r/g. Resp: CTAB, no w/r/r  Abd: nl BS, soft, nt/nd. Ext: Warm, dry  Neuro: CN II-XII intact, normal and equal strength, sensation, and reflexes bilaterally, normal gait, speech clear  Psych: AAOx3    MDM: Patient with colitis, will admit for IV antibiotics.

## 2025-06-10 NOTE — ED PROVIDER NOTE - PHYSICAL EXAMINATION
CONSTITUTIONAL: NAD, well developed. Sitting comfortably. No acute distress.   HEAD: normocephalic, atraumatic.   CARD: RRR. Normal S1, S2; no murmurs, rubs, or gallops.  RESP: Normal respiratory effort; breath sounds equal bilaterally; no wheezes, rhonchi, or rales. speaking in full sentences  ABD: Soft, non-distended, non-tender. No-guarding or rebound.   EXT: No LE edema bilaterally   SKIN: No rash on abdomen. Warm and dry.   NEURO: Awake, alert, conversant.

## 2025-06-10 NOTE — ED PROVIDER NOTE - NS ED MD DISPO ADMIT [facility] PALLIATIVE CARE
Quality 110: Preventive Care And Screening: Influenza Immunization: Influenza Immunization Administered during Influenza season Quality 111:Pneumonia Vaccination Status For Older Adults: Pneumococcal vaccine was not administered on or after patient’s 60th birthday and before the end of the measurement period, reason not otherwise specified Detail Level: Detailed NONE

## 2025-06-11 DIAGNOSIS — Z29.9 ENCOUNTER FOR PROPHYLACTIC MEASURES, UNSPECIFIED: ICD-10-CM

## 2025-06-11 DIAGNOSIS — E11.9 TYPE 2 DIABETES MELLITUS WITHOUT COMPLICATIONS: ICD-10-CM

## 2025-06-11 DIAGNOSIS — E78.5 HYPERLIPIDEMIA, UNSPECIFIED: ICD-10-CM

## 2025-06-11 DIAGNOSIS — I48.20 CHRONIC ATRIAL FIBRILLATION, UNSPECIFIED: ICD-10-CM

## 2025-06-11 DIAGNOSIS — E87.20 ACIDOSIS, UNSPECIFIED: ICD-10-CM

## 2025-06-11 DIAGNOSIS — N17.9 ACUTE KIDNEY FAILURE, UNSPECIFIED: ICD-10-CM

## 2025-06-11 DIAGNOSIS — K21.9 GASTRO-ESOPHAGEAL REFLUX DISEASE WITHOUT ESOPHAGITIS: ICD-10-CM

## 2025-06-11 DIAGNOSIS — E83.52 HYPERCALCEMIA: ICD-10-CM

## 2025-06-11 DIAGNOSIS — M10.9 GOUT, UNSPECIFIED: ICD-10-CM

## 2025-06-11 DIAGNOSIS — R19.7 DIARRHEA, UNSPECIFIED: ICD-10-CM

## 2025-06-11 LAB
A1C WITH ESTIMATED AVERAGE GLUCOSE RESULT: 5.7 % — HIGH (ref 4–5.6)
ADD ON TEST-SPECIMEN IN LAB: SIGNIFICANT CHANGE UP
ALBUMIN SERPL ELPH-MCNC: 3.2 G/DL — LOW (ref 3.3–5)
ALP SERPL-CCNC: 119 U/L — SIGNIFICANT CHANGE UP (ref 40–120)
ALT FLD-CCNC: 15 U/L — SIGNIFICANT CHANGE UP (ref 10–45)
ANION GAP SERPL CALC-SCNC: 9 MMOL/L — SIGNIFICANT CHANGE UP (ref 5–17)
AST SERPL-CCNC: 22 U/L — SIGNIFICANT CHANGE UP (ref 10–40)
BASOPHILS # BLD AUTO: 0.04 K/UL — SIGNIFICANT CHANGE UP (ref 0–0.2)
BASOPHILS NFR BLD AUTO: 0.5 % — SIGNIFICANT CHANGE UP (ref 0–2)
BILIRUB SERPL-MCNC: 0.4 MG/DL — SIGNIFICANT CHANGE UP (ref 0.2–1.2)
BUN SERPL-MCNC: 12 MG/DL — SIGNIFICANT CHANGE UP (ref 7–23)
CALCIUM SERPL-MCNC: 10.9 MG/DL — HIGH (ref 8.4–10.5)
CHLORIDE SERPL-SCNC: 108 MMOL/L — SIGNIFICANT CHANGE UP (ref 96–108)
CO2 SERPL-SCNC: 23 MMOL/L — SIGNIFICANT CHANGE UP (ref 22–31)
CREAT SERPL-MCNC: 0.64 MG/DL — SIGNIFICANT CHANGE UP (ref 0.5–1.3)
EGFR: 92 ML/MIN/1.73M2 — SIGNIFICANT CHANGE UP
EGFR: 92 ML/MIN/1.73M2 — SIGNIFICANT CHANGE UP
EOSINOPHIL # BLD AUTO: 0.14 K/UL — SIGNIFICANT CHANGE UP (ref 0–0.5)
EOSINOPHIL NFR BLD AUTO: 1.7 % — SIGNIFICANT CHANGE UP (ref 0–6)
ESTIMATED AVERAGE GLUCOSE: 117 MG/DL — HIGH (ref 68–114)
FOLATE SERPL-MCNC: 5.3 NG/ML — SIGNIFICANT CHANGE UP
GLUCOSE SERPL-MCNC: 131 MG/DL — HIGH (ref 70–99)
HCT VFR BLD CALC: 41.4 % — SIGNIFICANT CHANGE UP (ref 34.5–45)
HGB BLD-MCNC: 13 G/DL — SIGNIFICANT CHANGE UP (ref 11.5–15.5)
IMM GRANULOCYTES NFR BLD AUTO: 0.4 % — SIGNIFICANT CHANGE UP (ref 0–0.9)
LACTATE BLDV-MCNC: 3.1 MMOL/L — HIGH (ref 0.5–2)
LACTATE SERPL-SCNC: 2.2 MMOL/L — HIGH (ref 0.5–2)
LYMPHOCYTES # BLD AUTO: 1.47 K/UL — SIGNIFICANT CHANGE UP (ref 1–3.3)
LYMPHOCYTES # BLD AUTO: 17.5 % — SIGNIFICANT CHANGE UP (ref 13–44)
MCHC RBC-ENTMCNC: 31.4 G/DL — LOW (ref 32–36)
MCHC RBC-ENTMCNC: 32.4 PG — SIGNIFICANT CHANGE UP (ref 27–34)
MCV RBC AUTO: 103.2 FL — HIGH (ref 80–100)
MONOCYTES # BLD AUTO: 0.44 K/UL — SIGNIFICANT CHANGE UP (ref 0–0.9)
MONOCYTES NFR BLD AUTO: 5.2 % — SIGNIFICANT CHANGE UP (ref 2–14)
NEUTROPHILS # BLD AUTO: 6.27 K/UL — SIGNIFICANT CHANGE UP (ref 1.8–7.4)
NEUTROPHILS NFR BLD AUTO: 74.7 % — SIGNIFICANT CHANGE UP (ref 43–77)
NRBC BLD AUTO-RTO: 0 /100 WBCS — SIGNIFICANT CHANGE UP (ref 0–0)
PLATELET # BLD AUTO: 141 K/UL — LOW (ref 150–400)
POTASSIUM SERPL-MCNC: 3.9 MMOL/L — SIGNIFICANT CHANGE UP (ref 3.5–5.3)
POTASSIUM SERPL-SCNC: 3.9 MMOL/L — SIGNIFICANT CHANGE UP (ref 3.5–5.3)
PROCALCITONIN SERPL-MCNC: 0.13 NG/ML — HIGH (ref 0.02–0.1)
PROT SERPL-MCNC: 5.6 G/DL — LOW (ref 6–8.3)
RBC # BLD: 4.01 M/UL — SIGNIFICANT CHANGE UP (ref 3.8–5.2)
RBC # FLD: 13.2 % — SIGNIFICANT CHANGE UP (ref 10.3–14.5)
SODIUM SERPL-SCNC: 140 MMOL/L — SIGNIFICANT CHANGE UP (ref 135–145)
VIT B12 SERPL-MCNC: 276 PG/ML — SIGNIFICANT CHANGE UP (ref 232–1245)
WBC # BLD: 8.39 K/UL — SIGNIFICANT CHANGE UP (ref 3.8–10.5)
WBC # FLD AUTO: 8.39 K/UL — SIGNIFICANT CHANGE UP (ref 3.8–10.5)

## 2025-06-11 PROCEDURE — 99222 1ST HOSP IP/OBS MODERATE 55: CPT

## 2025-06-11 PROCEDURE — 93010 ELECTROCARDIOGRAM REPORT: CPT

## 2025-06-11 PROCEDURE — 99223 1ST HOSP IP/OBS HIGH 75: CPT | Mod: GC,AI

## 2025-06-11 RX ORDER — INSULIN LISPRO 100 U/ML
INJECTION, SOLUTION INTRAVENOUS; SUBCUTANEOUS AT BEDTIME
Refills: 0 | Status: DISCONTINUED | OUTPATIENT
Start: 2025-06-11 | End: 2025-06-12

## 2025-06-11 RX ORDER — MEMANTINE HYDROCHLORIDE 21 MG/1
10 CAPSULE, EXTENDED RELEASE ORAL EVERY 24 HOURS
Refills: 0 | Status: DISCONTINUED | OUTPATIENT
Start: 2025-06-11 | End: 2025-06-12

## 2025-06-11 RX ORDER — MEMANTINE HYDROCHLORIDE 21 MG/1
2 CAPSULE, EXTENDED RELEASE ORAL
Refills: 0 | DISCHARGE

## 2025-06-11 RX ORDER — AZITHROMYCIN 250 MG
500 CAPSULE ORAL EVERY 24 HOURS
Refills: 0 | Status: DISCONTINUED | OUTPATIENT
Start: 2025-06-11 | End: 2025-06-12

## 2025-06-11 RX ORDER — UBIDECARENONE 100 MG
1 CAPSULE ORAL
Refills: 0 | DISCHARGE

## 2025-06-11 RX ORDER — POLYETHYLENE GLYCOL-3350 AND ELECTROLYTES 236; 6.74; 5.86; 2.97; 22.74 G/274.31G; G/274.31G; G/274.31G; G/274.31G; G/274.31G
4000 POWDER, FOR SOLUTION ORAL ONCE
Refills: 0 | Status: DISCONTINUED | OUTPATIENT
Start: 2025-06-11 | End: 2025-06-11

## 2025-06-11 RX ORDER — APIXABAN 2.5 MG/1
5 TABLET, FILM COATED ORAL EVERY 12 HOURS
Refills: 0 | Status: DISCONTINUED | OUTPATIENT
Start: 2025-06-11 | End: 2025-06-11

## 2025-06-11 RX ORDER — ONDANSETRON HCL/PF 4 MG/2 ML
4 VIAL (ML) INJECTION EVERY 8 HOURS
Refills: 0 | Status: DISCONTINUED | OUTPATIENT
Start: 2025-06-11 | End: 2025-06-12

## 2025-06-11 RX ORDER — ATORVASTATIN CALCIUM 80 MG/1
80 TABLET, FILM COATED ORAL AT BEDTIME
Refills: 0 | Status: DISCONTINUED | OUTPATIENT
Start: 2025-06-11 | End: 2025-06-12

## 2025-06-11 RX ORDER — SITAGLIPTIN 100 MG/1
1 TABLET, FILM COATED ORAL
Refills: 0 | DISCHARGE

## 2025-06-11 RX ORDER — ACETAMINOPHEN 500 MG/5ML
650 LIQUID (ML) ORAL EVERY 6 HOURS
Refills: 0 | Status: DISCONTINUED | OUTPATIENT
Start: 2025-06-11 | End: 2025-06-12

## 2025-06-11 RX ORDER — APIXABAN 2.5 MG/1
5 TABLET, FILM COATED ORAL EVERY 12 HOURS
Refills: 0 | Status: DISCONTINUED | OUTPATIENT
Start: 2025-06-11 | End: 2025-06-12

## 2025-06-11 RX ORDER — CYST/ALA/Q10/PHOS.SER/DHA/BROC 100-20-50
1 POWDER (GRAM) ORAL DAILY
Refills: 0 | Status: DISCONTINUED | OUTPATIENT
Start: 2025-06-11 | End: 2025-06-12

## 2025-06-11 RX ORDER — INSULIN LISPRO 100 U/ML
INJECTION, SOLUTION INTRAVENOUS; SUBCUTANEOUS
Refills: 0 | Status: DISCONTINUED | OUTPATIENT
Start: 2025-06-11 | End: 2025-06-12

## 2025-06-11 RX ADMIN — MEMANTINE HYDROCHLORIDE 10 MILLIGRAM(S): 21 CAPSULE, EXTENDED RELEASE ORAL at 07:02

## 2025-06-11 RX ADMIN — Medication 500 MILLIGRAM(S): at 18:37

## 2025-06-11 RX ADMIN — ATORVASTATIN CALCIUM 80 MILLIGRAM(S): 80 TABLET, FILM COATED ORAL at 21:43

## 2025-06-11 RX ADMIN — APIXABAN 5 MILLIGRAM(S): 2.5 TABLET, FILM COATED ORAL at 21:43

## 2025-06-11 RX ADMIN — APIXABAN 5 MILLIGRAM(S): 2.5 TABLET, FILM COATED ORAL at 06:57

## 2025-06-11 RX ADMIN — Medication 40 MILLIGRAM(S): at 06:56

## 2025-06-11 RX ADMIN — Medication 300 MILLIGRAM(S): at 06:56

## 2025-06-11 NOTE — H&P ADULT - ASSESSMENT
74-year-old female with atrial fibrillation (on Eliquis), HLD, T2DM, GERD, gout, and hx of recurrent UTIs (ESBL Klebsiella in May and Oct 2024); now presenting with 3-day history of diarrhea.     #diarrhea  #k  #yuan  #lactate  #afib  #hld  #t2dm  #gerd  #gout 74-year-old female with atrial fibrillation (on Eliquis), HLD, T2DM, GERD, gout, bedbound, and hx of recurrent UTIs (ESBL Klebsiella in May and Oct 2024); now presenting with diarrhea since 6/6. Admitted to [facility] for diarrhea.

## 2025-06-11 NOTE — PATIENT PROFILE ADULT - FLU SEASON?
Good nutrition is important when healing from an illness, injury, or surgery. Follow any nutrition recommendations given to you during your hospital stay. If you were given an oral nutrition supplement while in the hospital, continue to take this supplement at home. You can take it with meals, in-between meals, and/or before bedtime. These supplements can be purchased at most local grocery stores, pharmacies, and chain [employer]-stores. If you have any questions about your diet or nutrition, call the hospital and ask for the dietitian.     Regular diet, low potassium
No

## 2025-06-11 NOTE — H&P ADULT - PROBLEM SELECTOR PLAN 4
Pt noted to have Ca 11, albumin 2.7. Corrected Ca 12. S/p 2L IVF in the ED. Pt noted to have Ca 11, albumin 2.7. Corrected Ca 12. S/p 2L IVF in the ED.  - F/u AM Ca Pt noted to have Ca 11, albumin 2.7. Corrected Ca 12. S/p 2L IVF in the ED.  - F/u AM Ca, PTH and 25-OH vitamin D

## 2025-06-11 NOTE — H&P ADULT - HISTORY OF PRESENT ILLNESS
74-year-old female with atrial fibrillation (on Eliquis), HLD, T2DM, GERD, gout, and hx of recurrent UTIs (ESBL Klebsiella in May and Oct 2024); now presenting with 3-day history of diarrhea. Reports 2-3 episodes/day with associated nausea and abdominal discomfort. Denies any recent abx use.    ED course:   Initial vital signs: T: 98.3F, HR: , BP: 155/80 -> 118/62, R: 16, SpO2: 96% on RA  Labs: significant for K3.7, bicarb 30, Cr 0.94, Ca 11, , lactate 3.2 -> 3.1 -> 3.5 -> 3.1  CTAP with IV contrast: Severe wall thickening and luminal narrowing of an approximately 7 cm segment of the terminal ileum with mild wall thickening of the cecum, possibly secondary to inflammatory bowel disease or other inflammatory or infectious etiologies.  Medications: CTX 1g IV x1, Flagyl 500mg IV x1, 1L LR bolus x1, 1L NS bolus x1  Consults: none  74-year-old female with atrial fibrillation (on Eliquis), HLD, T2DM, GERD, gout, bedbound, and hx of recurrent UTIs (ESBL Klebsiella in May and Oct 2024); now presenting with diarrhea since 6/6. Reports 4-5 episodes/day watery, dark brown diarrhea with associated chills. Denies any recent antibiotic use or laxative use. Denies any recent travel. States that she took Imodium on 6/6 with no relief in symptoms. Denies any fever, cough, nasal congestion, sore throat, chest pain, SOB, palpitations, abdominal pain, n/v, constipation, dysuria, hematuria.     ED course:   Initial vital signs: T: 98.3F, HR: , BP: 155/80 -> 118/62, R: 16, SpO2: 96% on RA  Labs: significant for K3.7, bicarb 30, Cr 0.94, Ca 11, , lactate 3.2 -> 3.1 -> 3.5 -> 3.1  CTAP with IV contrast: Severe wall thickening and luminal narrowing of an approximately 7 cm segment of the terminal ileum with mild wall thickening of the cecum, possibly secondary to inflammatory bowel disease or other inflammatory or infectious etiologies.  Medications: CTX 1g IV x1, Flagyl 500mg IV x1, 1L LR bolus x1, 1L NS bolus x1  Consults: none

## 2025-06-11 NOTE — H&P ADULT - NSICDXPASTMEDICALHX_GEN_ALL_CORE_FT
PAST MEDICAL HISTORY:  Atrial fibrillation     Diabetes     General weakness     GERD (gastroesophageal reflux disease)     Gout     Hypercalcemia     Hyperlipidemia

## 2025-06-11 NOTE — DIETITIAN INITIAL EVALUATION ADULT - PROBLEM SELECTOR PLAN 4
Pt noted to have Ca 11, albumin 2.7. Corrected Ca 12. S/p 2L IVF in the ED.  - F/u AM Ca, PTH and 25-OH vitamin D

## 2025-06-11 NOTE — DIETITIAN INITIAL EVALUATION ADULT - PERSON TAUGHT/METHOD
Education limited in setting of pt cognitive status. Encouraged good PO intake and reviewed protein dense foods aligned with preferences. Pt aware RD remains available for additional questions/concerns.

## 2025-06-11 NOTE — PATIENT PROFILE ADULT - FUNCTIONAL ASSESSMENT - DAILY ACTIVITY ASSESSMENT TYPE
Pt called stating pain medicine prescribed wears off after 3 hours,  reviewed with pt using OTC pain relievers in addition to the prescribed medication,  alternating the narcotic with OTC.  Also discussed using ice to help with pain and swelling.  We also reviewed stool softeners and laxative if he does not have a BM in 48 hours after surgery.  Pt expresses verbal understanding.    Admission

## 2025-06-11 NOTE — H&P ADULT - PROBLEM SELECTOR PLAN 7
Pt with T2DM, on home Metformin 500mg BID and Januvia 100mg qd.  - Hold home meds inpatient  - FSG q6h with ISS  - F/u A1c

## 2025-06-11 NOTE — CONSULT NOTE ADULT - ASSESSMENT
74-year-old female with hx of atrial fibrillation (on Eliquis), HLD, T2DM, GERD, gout, bedbound, and hx of recurrent UTIs (ESBL Klebsiella in May and Oct 2024); now presenting with diarrhea since 6/6 and GI was consulted for diarrhea evaluation and findings on CT.    PROCEDURE:  CT of the Abdomen and Pelvis was performed.  Sagittal and coronal reformats were performed.    FINDINGS:  LOWER CHEST: Small right greater than left pleural effusions with a small amount of underlying atelectasis. Again calcifications in the right breast..    LIVER: Within normal limits.  BILE DUCTS: Normal caliber.  GALLBLADDER: Cholelithiasis. Without evidence of acute cholecystitis.  SPLEEN: Within normal limits.  PANCREAS: Within normal limits.  ADRENALS: Within normal limits.  KIDNEYS/URETERS: multiple nonobstructing bilateral renal calculi. Multiple small bilateral renal cysts and subcentimeter hypodensities too small to characterize. As noted previously the largest cyst is a simple cyst measuring 4.3 cm in the upper pole right kidney.    BLADDER: Within normal limits.  REPRODUCTIVE ORGANS: 1.5 cm left adnexal cyst.    BOWEL: No bowel obstruction. Colonic diverticulosis. Severe wall thickening and luminal narrowing of an approximately 7 cm segment of the terminal ileum with mild wall thickening of the cecum. Appendix is not visualized.  PERITONEUM/RETROPERITONEUM: Within normal limits.  VESSELS: Atherosclerotic changes. IVC filter.  LYMPH NODES: No lymphadenopathy.  ABDOMINAL WALL: Small fat-containing right inguinal hernia.  BONES: Degenerative changes.    IMPRESSION:  Severe wall thickening and luminal narrowing of an approximately 7 cm segment of the terminal ileum with mild wall thickening of the cecum, possibly secondary to inflammatory bowel disease or other inflammatory or infectious etiologies.    Patient unable to give coherent medical history today but her diarrhea has resolved and is now having formed brown stools. Her GI PCR was positive for EAEC which is likely the source of patient's diarrhea and CT findings. She also reported taking regular NSAIDs daily for joint pain which can cause an NSAID induced enteropathy although unclear how accurate patient's history is given her mental status.     Recommendations:  - Azithromycin 500 mg PO for 3 days  - Advise patient to avoid NSAID use given possible NSAID induced enteropathy  - No plan for inpatient endoscopic evaluation at this time given acute GI infection. Recommend outpatient GI f/u to discuss further work up   - GI will sign off at this time. Please re-consult if patient's clinical status changes or you have further questions    Patient seen and discussed w/ GI attending Dr. Nader Porras MD  PGY-4 GI Fellow  GI consult pager (M-F [id number]): [phone]  Please call  for on-call fellow after hours

## 2025-06-11 NOTE — DIETITIAN INITIAL EVALUATION ADULT - OTHER INFO
74-year-old female with atrial fibrillation (on Eliquis), HLD, T2DM, GERD, gout, bedbound, and hx of recurrent UTIs (ESBL Klebsiella in May and Oct 2024) presenting with diarrhea since 6/6, admitted to [facility] for diarrhea. Pending GI PCR and possible scope 6/12.     Pt seen on 7UR for assessment. Labs and medication orders reviewed. Electrolytes WNL, lactate 2.2 <high>, POC blood glucose (6/11) 125-130, HgbA1c 5.7% (6/11). On Consistent Carbohydrate diet this AM, now on Clear Liquids pending possible scope. Pt resting in bed on visit this AM with PCA at bedside, pt confused and unable to provide diet/wt history. Pt denies pain and difficulty chewing/swallowing. Nutrition-focused physical examination insignificant for wasting. Per prior admission RD note (10/11/24) pt wt 130 pounds; current admission wt 148 pounds / 67.3 kilograms indicates +18 pound / 14% wt change over the past x8 months. RD obtained food preferences - forwarded to Dietary. No nausea/vomiting documented, last BM 6/10 per nursing. No abdominal distension/discomfort noted. No known food allergie. No Hindu/ethnic/cultural food preferences noted. No pressure injuries documented, 2+ generalized edema noted. See nutrition recommendations. RD to remain available.

## 2025-06-11 NOTE — H&P ADULT - PROBLEM SELECTOR PLAN 3
Noted to have lactate of 3.2 -> 3.1 -> 3.5 -> 3.1. Not meeting SIRS criteria. Likely type B lactic acidosis iso metformin use. S/p 2L IVF in the ED.  - F/u AM lactate

## 2025-06-11 NOTE — H&P ADULT - PROBLEM SELECTOR PLAN 1
Reports 4-5 episodes/day watery, dark brown diarrhea with associated chills since 6/6. CTAP with IV contrast showing Severe wall thickening and luminal narrowing of an approximately 7 cm segment of the terminal ileum with mild wall thickening of the cecum. Denies any abdominal pain, on exam, abdomen soft/ND, with mild diffuse tenderness, no rebound or guarding. Pt took Imodium on 6/6 with no relief in symptoms. Ddx: infectious ileitis vs Crohn's   - Obtain GI PCR  - Monitor stool count

## 2025-06-11 NOTE — DIETITIAN INITIAL EVALUATION ADULT - NS FNS DIET ORDER
Diet, NPO after Midnight:      NPO Start Date: 11-Jun-2025,   NPO Start Time: 23:59  Except Medications (06-11-25 @ 15:29)  Diet, Clear Liquid (06-11-25 @ 15:24)

## 2025-06-11 NOTE — CONSULT NOTE ADULT - SUBJECTIVE AND OBJECTIVE BOX
GASTROENTEROLOGY CONSULT NOTE  HPI: 74-year-old female with hx of atrial fibrillation (on Eliquis), HLD, T2DM, GERD, gout, bedbound, and hx of recurrent UTIs (ESBL Klebsiella in May and Oct 2024); now presenting with diarrhea since 6/6. Reports 4-5 episodes/day watery, dark brown diarrhea with associated chills. Denies any recent antibiotic use or laxative use. Denies any recent travel. States that she took Imodium on 6/6 with no relief in symptoms. Denies any fever, cough, nasal congestion, sore throat, chest pain, SOB, palpitations, abdominal pain, n/v, constipation, dysuria, hematuria. GI was consulted for diarrhea evaluation.    Patient was unable to give a coherent medical history 2/2 mental status, patient did not know where she was at time of our interview.     ED course:   Initial vital signs: T: 98.3F, HR: , BP: 155/80 -> 118/62, R: 16, SpO2: 96% on RA  Labs: significant for K3.7, bicarb 30, Cr 0.94, Ca 11, , lactate 3.2 -> 3.1 -> 3.5 -> 3.1  CTAP with IV contrast: Severe wall thickening and luminal narrowing of an approximately 7 cm segment of the terminal ileum with mild wall thickening of the cecum, possibly secondary to inflammatory bowel disease or other inflammatory or infectious etiologies.  Medications: CTX 1g IV x1, Flagyl 500mg IV x1, 1L LR bolus x1, 1L NS bolus x1  Consults: none  (11 Jun 2025 04:19)    Allergies    codeine (Unknown)    Intolerances      Home Medications:  allopurinol 300 mg oral tablet: 1 tab(s) orally once a day (11 Jun 2025 05:24)  apixaban 5 mg oral tablet: 1 tab(s) orally 2 times a day (11 Jun 2025 05:24)  atorvastatin 80 mg oral tablet: 1 tab(s) orally once a day (11 Jun 2025 05:24)  CoQ10 100 mg oral capsule: 1 cap(s) orally once a day (11 Jun 2025 05:23)  D3 25 mcg (1000 intl units) oral tablet: 2 tab(s) orally once a day (11 Jun 2025 05:23)  Januvia 100 mg oral tablet: 1 tab(s) orally once a day (11 Jun 2025 05:23)  memantine 5 mg oral tablet: 2 tab(s) orally once a day (11 Jun 2025 05:18)  metFORMIN 500 mg oral tablet: 1 tab(s) orally 2 times a day (11 Jun 2025 05:24)  methenamine hippurate 1 g oral tablet: 1 tab(s) orally once a day (11 Jun 2025 05:16)  pantoprazole 40 mg oral delayed release tablet: 1 tab(s) orally once a day (11 Jun 2025 05:23)    MEDICATIONS:  MEDICATIONS  (STANDING):  allopurinol 300 milliGRAM(s) Oral every 24 hours  apixaban 5 milliGRAM(s) Oral every 12 hours  atorvastatin 80 milliGRAM(s) Oral at bedtime  azithromycin   Tablet 500 milliGRAM(s) Oral every 24 hours  insulin lispro (ADMELOG) corrective regimen sliding scale   SubCutaneous three times a day before meals  insulin lispro (ADMELOG) corrective regimen sliding scale   SubCutaneous at bedtime  memantine 10 milliGRAM(s) Oral every 24 hours  pantoprazole    Tablet 40 milliGRAM(s) Oral before breakfast    MEDICATIONS  (PRN):  acetaminophen     Tablet .. 650 milliGRAM(s) Oral every 6 hours PRN Temp greater or equal to 38C (100.4F), Mild Pain (1 - 3)  ondansetron Injectable 4 milliGRAM(s) IV Push every 8 hours PRN Nausea and/or Vomiting    PAST MEDICAL & SURGICAL HISTORY:  General weakness      Atrial fibrillation      Diabetes      Gout      GERD (gastroesophageal reflux disease)      Hyperlipidemia      Hypercalcemia      No significant past surgical history        FAMILY HISTORY:  No pertinent family history in first degree relatives      SOCIAL HISTORY:  Tobacco: [ ] Current, [ ] Former, [ ] Never; Pack Years:  Alcohol:  Illicit Drugs:    REVIEW OF SYSTEMS:  All other 10 review of systems is negative unless indicated above.    Vital Signs Last 24 Hrs  T(C): 37 (11 Jun 2025 15:15), Max: 37 (11 Jun 2025 15:15)  T(F): 98.6 (11 Jun 2025 15:15), Max: 98.6 (11 Jun 2025 15:15)  HR: 99 (11 Jun 2025 15:41) (90 - 118)  BP: 116/74 (11 Jun 2025 15:41) (114/63 - 136/89)  BP(mean): 108 (11 Jun 2025 01:00) (108 - 108)  RR: 16 (11 Jun 2025 15:41) (16 - 18)  SpO2: 98% (11 Jun 2025 15:41) (94% - 100%)    Parameters below as of 11 Jun 2025 15:41  Patient On (Oxygen Delivery Method): room air          PHYSICAL EXAM:    General: lying in bed, cachetic appearing   HEENT: Neck supple, mmm, no jvd  Lungs: Normal respiratory effort, no intercostal retractions  Cardiovascular: regular rate  Abdomen: Soft, mild RLQ tenderness non-distended; No rebound or guarding  Neurological: AOx2, unable to give a coherent medical history and frequently answered questions in nonsensical manner  Skin: Warm and dry. No obvious rash  Rectal: Normal tone, brown stool    LABS:                        13.0   8.39  )-----------( 141      ( 11 Jun 2025 08:03 )             41.4     06-11    140  |  108  |  12  ----------------------------<  131[H]  3.9   |  23  |  0.64    Ca    10.9[H]      11 Jun 2025 08:03    TPro  5.6[L]  /  Alb  3.2[L]  /  TBili  0.4  /  DBili  x   /  AST  22  /  ALT  15  /  AlkPhos  119  06-11            RADIOLOGY & ADDITIONAL STUDIES:     Reviewed

## 2025-06-11 NOTE — PATIENT PROFILE ADULT - FALL HARM RISK - HARM RISK INTERVENTIONS
Assistance with ambulation/Assistance OOB with selected safe patient handling equipment/Communicate Risk of Fall with Harm to all staff/Discuss with provider need for PT consult/Monitor gait and stability/Reinforce activity limits and safety measures with patient and family/Tailored Fall Risk Interventions/Visual Cue: Yellow wristband and red socks/Bed in lowest position, wheels locked, appropriate side rails in place/Call bell, personal items and telephone in reach/Instruct patient to call for assistance before getting out of bed or chair/Non-slip footwear when patient is out of bed/[place] to call system/Physically safe environment - no spills, clutter or unnecessary equipment/Purposeful Proactive Rounding/Room/bathroom lighting operational, light cord in reach

## 2025-06-11 NOTE — DIETITIAN INITIAL EVALUATION ADULT - OTHER CALCULATIONS
Estimated needs based on dosing wt as within %  pounds / 56.8 kilograms (118%). Needs adjusted for advanced age and BMI.

## 2025-06-11 NOTE — DIETITIAN INITIAL EVALUATION ADULT - ADD RECOMMEND
1. As medically feasible status post procedure, recommend resume Regular diet.   2. Monitor GI tolerance, weight trends, labs, and skin integrity.  3. Defer bowel and pain regimens to team.   >>Consider Banatrol to promote fecal bulk on PO diet.   4. RD to remain available for diet education/intervention prn.

## 2025-06-11 NOTE — DIETITIAN INITIAL EVALUATION ADULT - PERTINENT MEDS FT
MEDICATIONS  (STANDING):  allopurinol 300 milliGRAM(s) Oral every 24 hours  atorvastatin 80 milliGRAM(s) Oral at bedtime  insulin lispro (ADMELOG) corrective regimen sliding scale   SubCutaneous three times a day before meals  insulin lispro (ADMELOG) corrective regimen sliding scale   SubCutaneous at bedtime  memantine 10 milliGRAM(s) Oral every 24 hours  pantoprazole    Tablet 40 milliGRAM(s) Oral before breakfast    MEDICATIONS  (PRN):  acetaminophen     Tablet .. 650 milliGRAM(s) Oral every 6 hours PRN Temp greater or equal to 38C (100.4F), Mild Pain (1 - 3)  ondansetron Injectable 4 milliGRAM(s) IV Push every 8 hours PRN Nausea and/or Vomiting

## 2025-06-11 NOTE — H&P ADULT - PROBLEM SELECTOR PLAN 2
Presented with Cr 0.94 (BL Cr 0.60). Likely pre-renal iso diarrhea and low PO intake. S/p 2L of IVF in ED.  - Obtain urine studies   - F/u AM BMP

## 2025-06-11 NOTE — H&P ADULT - NSHPLABSRESULTS_GEN_ALL_CORE
13.5   7.17  )-----------( 155      ( 10 Junior 2025 11:08 )             43.5       06-10    142  |  106  |  12  ----------------------------<  167[H]  3.7   |  30  |  0.94    Ca    11.0[H]      10 Junior 2025 11:08    TPro  5.8[L]  /  Alb  2.7[L]  /  TBili  0.5  /  DBili  x   /  AST  27  /  ALT  17  /  AlkPhos  125[H]  06-10              Urinalysis Basic - ( 10 Junior 2025 11:08 )    Color: x / Appearance: x / SG: x / pH: x  Gluc: 167 mg/dL / Ketone: x  / Bili: x / Urobili: x   Blood: x / Protein: x / Nitrite: x   Leuk Esterase: x / RBC: x / WBC x   Sq Epi: x / Non Sq Epi: x / Bacteria: x            Lactate Trend            CAPILLARY BLOOD GLUCOSE

## 2025-06-11 NOTE — H&P ADULT - ATTENDING COMMENTS
Patient was seen and examined at bedside on 6/11/2025 at 11 am. Patient has no acute complaints, reports last BM in AM. Denies SOB, CP. ROS is otherwise negative. Vitals, labwork and pertinent imaging reviewed. Exam - NAD, AAO x 4, PERRLA, EOMI, MMM, supple neck, chest - CTA b/l, CV - tachycardia, no m/r/g. abd - soft, NTND, + BS, ext - wwp, psych - normal affect    Plan:  -Check EKG - appears rapid?  -GI consulted for possible C-scope given CT findings

## 2025-06-11 NOTE — H&P ADULT - NSHPPHYSICALEXAM_GEN_ALL_CORE
.  VITAL SIGNS:  T(C): 36.6 (06-11-25 @ 01:00), Max: 36.8 (06-10-25 @ 10:31)  T(F): 97.9 (06-11-25 @ 01:00), Max: 98.3 (06-10-25 @ 10:31)  HR: 97 (06-11-25 @ 01:00) (85 - 100)  BP: 136/89 (06-11-25 @ 01:00) (114/63 - 155/80)  BP(mean): 108 (06-11-25 @ 01:00) (108 - 108)  RR: 18 (06-11-25 @ 01:00) (16 - 18)  SpO2: 100% (06-11-25 @ 01:00) (94% - 100%)  Wt(kg): --    PHYSICAL EXAM:    Constitutional: resting comfortably in bed; NAD  Head: NC/AT  Eyes: PERRL, EOMI, anicteric sclera  ENT: no nasal discharge; uvula midline, no oropharyngeal erythema or exudates; MMM  Neck: supple; no JVD or thyromegaly  Respiratory: CTA B/L; no W/R/R, no retractions  Cardiac: +S1/S2; RRR; no M/R/G; PMI non-displaced  Gastrointestinal: abdomen soft, NT/ND; no rebound or guarding; +BSx4  Back: spine midline, no bony tenderness or step-offs; no CVAT B/L  Extremities: WWP, no clubbing or cyanosis; no peripheral edema  Musculoskeletal: NROM x4; no joint swelling, tenderness or erythema  Vascular: 2+ radial, femoral, DP/PT pulses B/L  Dermatologic: skin warm, dry and intact; no rashes, wounds, or scars  Lymphatic: no submandibular or cervical LAD  Neurologic: AAOx3; CNII-XII grossly intact; no focal deficits  Psychiatric: affect and characteristics of appearance, verbalizations, behaviors are appropriate .  VITAL SIGNS:  T(C): 36.6 (06-11-25 @ 01:00), Max: 36.8 (06-10-25 @ 10:31)  T(F): 97.9 (06-11-25 @ 01:00), Max: 98.3 (06-10-25 @ 10:31)  HR: 97 (06-11-25 @ 01:00) (85 - 100)  BP: 136/89 (06-11-25 @ 01:00) (114/63 - 155/80)  BP(mean): 108 (06-11-25 @ 01:00) (108 - 108)  RR: 18 (06-11-25 @ 01:00) (16 - 18)  SpO2: 100% (06-11-25 @ 01:00) (94% - 100%)  Wt(kg): --    PHYSICAL EXAM:    Constitutional: resting comfortably in bed; NAD  ENT: PERRL, EOMI, MMM  Respiratory: CTA B/L; no W/R/R  Cardiac: irregular irregular   Gastrointestinal: abdomen soft, ND, mild diffuse tenderness; no rebound or guarding  Extremities: WWP, no LE edema; + chronic venous stasis dermatitis to LLE  Vascular: 2+ radial pulses B/L  Neurologic: AAOx3; 3/5 LE muscle strength b/l, 4/5 UE muscle strength b/l

## 2025-06-11 NOTE — DIETITIAN INITIAL EVALUATION ADULT - PERTINENT LABORATORY DATA
06-11    140  |  108  |  12  ----------------------------<  131[H]  3.9   |  23  |  0.64    Ca    10.9[H]      11 Jun 2025 08:03    TPro  5.6[L]  /  Alb  3.2[L]  /  TBili  0.4  /  DBili  x   /  AST  22  /  ALT  15  /  AlkPhos  119  06-11  POCT Blood Glucose.: 130 mg/dL (06-11-25 @ 12:45)  A1C with Estimated Average Glucose Result: 5.7 % (06-11-25 @ 08:03)  A1C with Estimated Average Glucose Result: 5.6 % (10-11-24 @ 07:52)

## 2025-06-12 ENCOUNTER — TRANSCRIPTION ENCOUNTER (OUTPATIENT)
Age: 75
End: 2025-06-12

## 2025-06-12 VITALS
TEMPERATURE: 98 F | DIASTOLIC BLOOD PRESSURE: 76 MMHG | HEART RATE: 96 BPM | RESPIRATION RATE: 18 BRPM | SYSTOLIC BLOOD PRESSURE: 127 MMHG | OXYGEN SATURATION: 100 %

## 2025-06-12 LAB — LACTATE SERPL-SCNC: 1.4 MMOL/L — SIGNIFICANT CHANGE UP (ref 0.5–2)

## 2025-06-12 PROCEDURE — 87507 IADNA-DNA/RNA PROBE TQ 12-25: CPT

## 2025-06-12 PROCEDURE — 83921 ORGANIC ACID SINGLE QUANT: CPT

## 2025-06-12 PROCEDURE — 74177 CT ABD & PELVIS W/CONTRAST: CPT

## 2025-06-12 PROCEDURE — 82962 GLUCOSE BLOOD TEST: CPT

## 2025-06-12 PROCEDURE — 99239 HOSP IP/OBS DSCHRG MGMT >30: CPT

## 2025-06-12 PROCEDURE — 93005 ELECTROCARDIOGRAM TRACING: CPT

## 2025-06-12 PROCEDURE — 96375 TX/PRO/DX INJ NEW DRUG ADDON: CPT

## 2025-06-12 PROCEDURE — 82607 VITAMIN B-12: CPT

## 2025-06-12 PROCEDURE — 99285 EMERGENCY DEPT VISIT HI MDM: CPT | Mod: 25

## 2025-06-12 PROCEDURE — 36415 COLL VENOUS BLD VENIPUNCTURE: CPT

## 2025-06-12 PROCEDURE — 82746 ASSAY OF FOLIC ACID SERUM: CPT

## 2025-06-12 PROCEDURE — 85025 COMPLETE CBC W/AUTO DIFF WBC: CPT

## 2025-06-12 PROCEDURE — 83993 ASSAY FOR CALPROTECTIN FECAL: CPT

## 2025-06-12 PROCEDURE — 83036 HEMOGLOBIN GLYCOSYLATED A1C: CPT

## 2025-06-12 PROCEDURE — 87040 BLOOD CULTURE FOR BACTERIA: CPT

## 2025-06-12 PROCEDURE — 96374 THER/PROPH/DIAG INJ IV PUSH: CPT

## 2025-06-12 PROCEDURE — 86140 C-REACTIVE PROTEIN: CPT

## 2025-06-12 PROCEDURE — 80053 COMPREHEN METABOLIC PANEL: CPT

## 2025-06-12 PROCEDURE — 83605 ASSAY OF LACTIC ACID: CPT

## 2025-06-12 PROCEDURE — 84145 PROCALCITONIN (PCT): CPT

## 2025-06-12 RX ORDER — CYST/ALA/Q10/PHOS.SER/DHA/BROC 100-20-50
1 POWDER (GRAM) ORAL
Qty: 0 | Refills: 0
Start: 2025-06-12

## 2025-06-12 RX ORDER — AZITHROMYCIN 250 MG
1 CAPSULE ORAL
Qty: 2 | Refills: 0
Start: 2025-06-12

## 2025-06-12 RX ADMIN — APIXABAN 5 MILLIGRAM(S): 2.5 TABLET, FILM COATED ORAL at 09:45

## 2025-06-12 RX ADMIN — Medication 1 TABLET(S): at 11:44

## 2025-06-12 RX ADMIN — Medication 300 MILLIGRAM(S): at 06:21

## 2025-06-12 RX ADMIN — Medication 40 MILLIGRAM(S): at 06:21

## 2025-06-12 RX ADMIN — MEMANTINE HYDROCHLORIDE 10 MILLIGRAM(S): 21 CAPSULE, EXTENDED RELEASE ORAL at 06:21

## 2025-06-12 NOTE — PROGRESS NOTE ADULT - PROBLEM SELECTOR PLAN 2
Presented with Cr 0.94 (BL Cr 0.60). Likely pre-renal iso diarrhea and low PO intake. S/p 2L of IVF in ED.  - Obtain urine studies   - F/u AM BMP RESOLVED.   Presented with Cr 0.94 (BL Cr 0.60). Likely pre-renal iso diarrhea and low PO intake. S/p 2L of IVF in ED.

## 2025-06-12 NOTE — DISCHARGE NOTE NURSING/CASE MANAGEMENT/SOCIAL WORK - NURSING SECTION COMPLETE
Called and booked 3/19, after her trip to [place].     Nely iVlla MA P Bonchak, J Der Triage Nurse Msg Pool  2 mm punch on the nose-upper potion of lesion and 2 shave removals on the back.  (With in 1 month) please  
Patient/Caregiver provided printed discharge information.

## 2025-06-12 NOTE — PROGRESS NOTE ADULT - PROBLEM SELECTOR PLAN 7
Pt with T2DM, on home Metformin 500mg BID and Januvia 100mg qd.  - Hold home meds inpatient  - FSG q6h with ISS  - F/u A1c
normal rate and rhythm, no chest pain and no edema. no palpitations

## 2025-06-12 NOTE — PROGRESS NOTE ADULT - PROBLEM SELECTOR PLAN 1
Reports 4-5 episodes/day watery, dark brown diarrhea with associated chills since 6/6. CTAP with IV contrast showing Severe wall thickening and luminal narrowing of an approximately 7 cm segment of the terminal ileum with mild wall thickening of the cecum. Denies any abdominal pain, on exam, abdomen soft/ND, with mild diffuse tenderness, no rebound or guarding. Pt took Imodium on 6/6 with no relief in symptoms. Ddx: infectious ileitis vs Crohn's   - Obtain GI PCR  - Monitor stool count Reports 4-5 episodes/day watery, dark brown diarrhea with associated chills since 6/6. CTAP with IV contrast showing Severe wall thickening and luminal narrowing of an approximately 7 cm segment of the terminal ileum with mild wall thickening of the cecum. Denies any abdominal pain, on exam, abdomen soft/ND, with mild diffuse tenderness, no rebound or guarding. Pt took Imodium on 6/6 with no relief in symptoms.   - GI consulted  - GI PCR +EAEC, started azithromycin 500 mg x 3 days per GI

## 2025-06-12 NOTE — DISCHARGE NOTE PROVIDER - CARE PROVIDER_API CALL
Barbara Doe  Gastroenterology  [address]  [place] [address]  Phone: [phone]  Fax: [phone]  Follow Up Time:

## 2025-06-12 NOTE — DISCHARGE NOTE NURSING/CASE MANAGEMENT/SOCIAL WORK - FINANCIAL ASSISTANCE
[facility] provides services at a reduced cost to those who are determined to be eligible through [facility]’s financial assistance program. Information regarding [facility]’s financial assistance program can be found by going to [email or web address] or by calling [phone].

## 2025-06-12 NOTE — PROGRESS NOTE ADULT - PROBLEM SELECTOR PROBLEM 6
Chart reviewed, including vital signs, medication administration record, lab results, and nursing notes.   Case discussed with nursing staff   - No acute events overnight the day room where he is working on a paper and says he wants to finish with the colors he is using and wants to talk later.   Denies SI/HI/AVH. Reports no medication side effects. HLD (hyperlipidemia)

## 2025-06-12 NOTE — DISCHARGE NOTE PROVIDER - NSDCCPCAREPLAN_GEN_ALL_CORE_FT
PRINCIPAL DISCHARGE DIAGNOSIS  Diagnosis: Colitis  Assessment and Plan of Treatment: Enteroaggregative E. coli, or EAEC, are a group of  bacteria that causes intestinal illness.  EAEC is commonly associated with "travelers  diarrhea," but can happen anywhere regardless of  travel.  EAEC is transmitted through eating or drinking  contaminated food or water.  Both children and adults can become ill from EAEC.  The best way to decrease your chance of infection is to  practice good hygiene.  -Wash your hands often, especially:  after using the bathroom  before, during, and after preparing food  after touching feces (poop) or garbage  after contact with animals  -Cook all meats all the way.  -Keep raw meats away from other foods.  -Wash all fruits and vegetables.  -Do NOT drink untreated water or unpasteurized   Please continue taking azithromycin 500 mg once daily through 6/13. Please follow up with your gastroenterologist and primary care doctor within 1-2 weeks. If you develop high fever, dizziness, increasing confusion, severe pain, or worsening of your symptoms, please return to the hospital immediately.      SECONDARY DISCHARGE DIAGNOSES  Diagnosis: Hypercalcemia  Assessment and Plan of Treatment: You were found to have mildly elevated calcium. We recommend following up with your primary care doctor to recheck your calcium after discharge and other hormone levels (ex. parathyroid hormone) that may be contributing.     PRINCIPAL DISCHARGE DIAGNOSIS  Diagnosis: Colitis  Assessment and Plan of Treatment: Enteroaggregative E. coli, or EAEC, are a group of  bacteria that causes intestinal illness.  EAEC is commonly associated with "travelers  diarrhea," but can happen anywhere regardless of  travel.  EAEC is transmitted through eating or drinking  contaminated food or water.  Both children and adults can become ill from EAEC.  The best way to decrease your chance of infection is to  practice good hygiene.  -Wash your hands often, especially:  after using the bathroom  before, during, and after preparing food  after touching feces (poop) or garbage  after contact with animals  -Cook all meats all the way.  -Keep raw meats away from other foods.  -Wash all fruits and vegetables.  -Do NOT drink untreated water or unpasteurized   Please continue taking azithromycin 500 mg once daily through 6/13. Please follow up with your gastroenterologist and primary care doctor within 1-2 weeks. If you develop high fever, dizziness, increasing confusion, severe pain, or worsening of your symptoms, please return to the hospital immediately.      SECONDARY DISCHARGE DIAGNOSES  Diagnosis: MARIA E (acute kidney injury)  Assessment and Plan of Treatment:     Diagnosis: Chronic atrial fibrillation  Assessment and Plan of Treatment:     Diagnosis: Hypercalcemia  Assessment and Plan of Treatment: You were found to have mildly elevated calcium. We recommend following up with your primary care doctor to recheck your calcium after discharge and other hormone levels (ex. parathyroid hormone) that may be contributing.

## 2025-06-12 NOTE — PROGRESS NOTE ADULT - PROBLEM SELECTOR PLAN 4
Pt noted to have Ca 11, albumin 2.7. Corrected Ca 12. S/p 2L IVF in the ED.  - F/u AM Ca, PTH and 25-OH vitamin D Pt noted to have Ca 11, albumin 2.7. Corrected Ca 12. S/p 2L IVF in the ED.  - outpatient follow up

## 2025-06-12 NOTE — DISCHARGE NOTE PROVIDER - ATTENDING DISCHARGE PHYSICAL EXAMINATION:
Patient was seen and examined at bedside on 6/12/2025 at 11 am. Patient has no acute complaints, reports last BM in AM. Denies SOB, CP. ROS is otherwise negative. Vitals, labwork and pertinent imaging reviewed. Exam - NAD, AAO x 4, PERRLA, EOMI, MMM, supple neck, chest - CTA b/l, CV - rrr, no m/r/g. abd - soft, NTND, + BS, ext - wwp, psych - normal affect    Plan:  - Patient was seen and examined at bedside on 6/12/2025 at 11 am. Patient has no acute complaints, reports all presenting symptoms have resolved. Denies SOB, CP. ROS is otherwise negative. Vitals, labwork and pertinent imaging reviewed. Exam - NAD, AAO x 4, PERRLA, EOMI, MMM, supple neck, chest - CTA b/l, CV - rrr, no m/r/g. abd - soft, NTND, + BS, ext - wwp, psych - normal affect    Plan:  -Patient is medically ready for d/c with close outpatient follow up, plan of care and return precautions discussed in detail - patient demonstrates understanding

## 2025-06-12 NOTE — DISCHARGE NOTE NURSING/CASE MANAGEMENT/SOCIAL WORK - PATIENT PORTAL LINK FT
You can access the FollowMyHealth Patient Portal offered by [facility] by registering at the following website: [email or web address]. By joining [employer]’s FollowMyHealth portal, you will also be able to view your health information using other applications (apps) compatible with our system.

## 2025-06-12 NOTE — DISCHARGE NOTE PROVIDER - NSDCDCMDCOMP_GEN_ALL_CORE
"Chief Complaint   Patient presents with     Physical       Initial /72 (BP Location: Left arm, Patient Position: Sitting, Cuff Size: Adult Regular)   Pulse 64   Resp 14   Ht 1.778 m (5' 10\")   Wt 95.7 kg (211 lb)   BMI 30.28 kg/m   Estimated body mass index is 30.28 kg/m  as calculated from the following:    Height as of this encounter: 1.778 m (5' 10\").    Weight as of this encounter: 95.7 kg (211 lb).  Medication Reconciliation: complete    Mae Werner LPN    "
This document is complete and the patient is ready for discharge.

## 2025-06-12 NOTE — PROGRESS NOTE ADULT - ASSESSMENT
74-year-old female with atrial fibrillation (on Eliquis), HLD, T2DM, GERD, gout, bedbound, and hx of recurrent UTIs (ESBL Klebsiella in May and Oct 2024); now presenting with diarrhea since 6/6. Admitted to [facility] for diarrhea.

## 2025-06-12 NOTE — PROGRESS NOTE ADULT - SUBJECTIVE AND OBJECTIVE BOX
*****INCOMPLETE NOTE*****    INTERVAL HPI/OVERNIGHT EVENTS:    SUBJECTIVE: Patient seen and examined at bedside, resting comfortably in bed, and does not appear to be in any acute distress. Patient reports    Vital Signs Last 24 Hrs  T(C): 36.8 (12 Jun 2025 10:31), Max: 37 (11 Jun 2025 15:15)  T(F): 98.3 (12 Jun 2025 10:31), Max: 98.6 (11 Jun 2025 15:15)  HR: 96 (12 Jun 2025 10:31) (87 - 118)  BP: 127/76 (12 Jun 2025 10:31) (116/74 - 136/87)  BP(mean): --  RR: 18 (12 Jun 2025 10:31) (16 - 18)  SpO2: 100% (12 Jun 2025 10:31) (97% - 100%)    Parameters below as of 12 Jun 2025 10:31  Patient On (Oxygen Delivery Method): room air        PHYSICAL EXAM:  General: in no acute distress  Eyes: EOMI intact bilaterally. Anicteric sclerae, moist conjunctivae  HENT: Moist mucous membranes  Neck: Trachea midline, supple  Lungs: CTA B/L. No wheezes, rales, or rhonchi  Cardiovascular: RRR. No murmurs, rubs, or gallops  Abdomen: Soft, non-tender non-distended; No rebound or guarding  Extremities: WWP, No clubbing, cyanosis or edema  Neurological: Alert and oriented  Skin: Warm and dry. No obvious rash     LABS:                        13.0   8.39  )-----------( 141      ( 11 Jun 2025 08:03 )             41.4     06-11    140  |  108  |  12  ----------------------------<  131[H]  3.9   |  23  |  0.64    Ca    10.9[H]      11 Jun 2025 08:03    TPro  5.6[L]  /  Alb  3.2[L]  /  TBili  0.4  /  DBili  x   /  AST  22  /  ALT  15  /  AlkPhos  119  06-11   INTERVAL HPI/OVERNIGHT EVENTS: DRU o/n    SUBJECTIVE: Patient seen and examined at bedside, resting comfortably in bed, and does not appear to be in any acute distress. Patient denies having any complaints.    Vital Signs Last 24 Hrs  T(C): 36.8 (12 Jun 2025 10:31), Max: 37 (11 Jun 2025 15:15)  T(F): 98.3 (12 Jun 2025 10:31), Max: 98.6 (11 Jun 2025 15:15)  HR: 96 (12 Jun 2025 10:31) (87 - 118)  BP: 127/76 (12 Jun 2025 10:31) (116/74 - 136/87)  BP(mean): --  RR: 18 (12 Jun 2025 10:31) (16 - 18)  SpO2: 100% (12 Jun 2025 10:31) (97% - 100%)    Parameters below as of 12 Jun 2025 10:31  Patient On (Oxygen Delivery Method): room air        PHYSICAL EXAM:  General: in no acute distress  Eyes: EOMI intact bilaterally. Anicteric sclerae, moist conjunctivae  HENT: Moist mucous membranes  Neck: Trachea midline, supple  Lungs: CTA B/L. No wheezes, rales, or rhonchi  Cardiovascular: RRR. No murmurs, rubs, or gallops  Abdomen: Soft, non-tender non-distended; No rebound or guarding  Extremities: WWP, No clubbing, cyanosis or edema  Neurological: Alert and oriented to self, hospital, month and year  Skin: Warm and dry. No obvious rash     LABS:                        13.0   8.39  )-----------( 141      ( 11 Jun 2025 08:03 )             41.4     06-11    140  |  108  |  12  ----------------------------<  131[H]  3.9   |  23  |  0.64    Ca    10.9[H]      11 Jun 2025 08:03    TPro  5.6[L]  /  Alb  3.2[L]  /  TBili  0.4  /  DBili  x   /  AST  22  /  ALT  15  /  AlkPhos  119  06-11

## 2025-06-12 NOTE — PROGRESS NOTE ADULT - PROBLEM SELECTOR PLAN 3
Noted to have lactate of 3.2 -> 3.1 -> 3.5 -> 3.1. Not meeting SIRS criteria. Likely type B lactic acidosis iso metformin use. S/p 2L IVF in the ED.  - F/u AM lactate RESOLVED  Noted to have lactate of 3.2 -> 3.1 -> 3.5 -> 3.1. Not meeting SIRS criteria. Likely type B lactic acidosis iso metformin use. S/p 2L IVF in the ED.  - AM lactate 1.4

## 2025-06-12 NOTE — DISCHARGE NOTE PROVIDER - HOSPITAL COURSE
#Discharge: do not delete    Patient is 74-year-old female with atrial fibrillation (on Eliquis), HLD, T2DM, GERD, gout, bedbound, and hx of recurrent UTIs (ESBL Klebsiella in May and Oct 2024); now presenting with diarrhea and MARIA E and admitted to [facility]. GI evaluated, recommended outpatient follow up. Found to be EAEC positive, started on azithromycin 500 mg x 3 days per GI. Diarrhea resolved, had formed BM, and MARIA E resolved with IV fluids. Started on regular diet and tolerated PO intake well. Stable for discharge and outpatient follow up.     Hospital course (by problem):   #Diarrhea, RESOLVED   Reports 4-5 episodes/day watery, dark brown diarrhea with associated chills since 6/6. CTAP with IV contrast showing Severe wall thickening and luminal narrowing of an approximately 7 cm segment of the terminal ileum with mild wall thickening of the cecum. Denies any abdominal pain, on exam, abdomen soft/ND, with mild diffuse tenderness, no rebound or guarding. Pt took Imodium on 6/6 with no relief in symptoms. GI PCR +EAEC. Had formed BM 6/11. GI evaluated, recommended outpatient follow up.     #MARIA E (acute kidney injury).   Presented with Cr 0.94 (BL Cr 0.60). Likely pre-renal iso diarrhea and low PO intake. S/p 2L of IVF in ED, now resolved with Cr back to baseline and tolerating PO intake well.     #Elevated lactate   Noted to have lactate of 3.2 -> 3.1 -> 3.5 -> 3.1, now downtrended to 2.2. Not meeting SIRS criteria. Likely type B lactic acidosis iso metformin use. On exam, continues to be well perfused without signs of dehydration. S/p 2L IVF in the ED.    #Hypercalcemia.   Pt noted to have mild hypercalcemia with Ca 11, albumin 2.7. Corrected Ca 12. S/p 2L IVF in the ED.  - Outpatient follow up    #Chronic atrial fibrillation.   Pt with chronic afib, rate-controlled. On Eliquis 5mg BID.  - C/w home med.    #HLD (hyperlipidemia).   Pt with HLD, on home Atorvastatin 80mg qhs.  - C/w home med.    #Diabetes.   Pt with T2DM, on home Metformin 500mg BID and Januvia 100mg qd.  - Hold home meds inpatient, can resume on discharge  - FSG q6h with ISS    #GERD (gastroesophageal reflux disease).   Pt with GERD, on home pantoprazole 40mg qd.  - C/w home med.    #Gout.   Pt with gout, on home allopurinol 300mg qd.  - C/w home med.      Patient was discharged to: home with HHA    New medications: azithromycin 500 mg x 3 days (EOT 6/13)  Changes to old medications: N/A  Medications that were stopped: N/A    Items to follow up as outpatient: PCP, GI    Physical exam at the time of discharge:  General: in no acute distress  Eyes: EOMI intact bilaterally. Anicteric sclerae, moist conjunctivae  HENT: Moist mucous membranes  Neck: Trachea midline, supple  Lungs: CTA B/L. No wheezes, rales, or rhonchi  Cardiovascular: RRR. No murmurs, rubs, or gallops  Abdomen: Soft, non-tender non-distended; No rebound or guarding  Extremities: WWP, No clubbing, cyanosis or edema  Neurological: Alert and oriented  Skin: Warm and dry. No obvious rash

## 2025-06-14 NOTE — ED ADULT NURSE NOTE - AS PAIN REST
Pt presented to ED ambulatory from triage.  Pt presents with C/O of abdominal pain .  Pt states this pain has been going on for about 3 days pt stated he was seen  yesterday for the same thing .  Pt denies any other C/O   Pt stated he is still having bowl movents .  Pt has a hx of POTS and seizures .  Pt is Alert and oriented. Pt is resting comfortably on stretcher with call light in reach.  No acute distress noted. Respirations are even and unlabored.  White board updated. Will continue to follow plan of care.      5 (moderate pain)

## 2025-06-15 LAB — CALPROTECTIN STL-MCNT: 883 UG/G — HIGH (ref 0–120)

## 2025-06-16 LAB
CULTURE RESULTS: SIGNIFICANT CHANGE UP
CULTURE RESULTS: SIGNIFICANT CHANGE UP
METHYLMALONATE SERPL-SCNC: 244 NMOL/L — SIGNIFICANT CHANGE UP (ref 0–378)
SPECIMEN SOURCE: SIGNIFICANT CHANGE UP
SPECIMEN SOURCE: SIGNIFICANT CHANGE UP

## 2025-06-20 DIAGNOSIS — T38.3X5A ADVERSE EFFECT OF INSULIN AND ORAL HYPOGLYCEMIC [ANTIDIABETIC] DRUGS, INITIAL ENCOUNTER: ICD-10-CM

## 2025-06-20 DIAGNOSIS — E87.20 ACIDOSIS, UNSPECIFIED: ICD-10-CM

## 2025-06-20 DIAGNOSIS — K21.9 GASTRO-ESOPHAGEAL REFLUX DISEASE WITHOUT ESOPHAGITIS: ICD-10-CM

## 2025-06-20 DIAGNOSIS — K57.90 DIVERTICULOSIS OF INTESTINE, PART UNSPECIFIED, WITHOUT PERFORATION OR ABSCESS WITHOUT BLEEDING: ICD-10-CM

## 2025-06-20 DIAGNOSIS — R92.1 MAMMOGRAPHIC CALCIFICATION FOUND ON DIAGNOSTIC IMAGING OF BREAST: ICD-10-CM

## 2025-06-20 DIAGNOSIS — N17.9 ACUTE KIDNEY FAILURE, UNSPECIFIED: ICD-10-CM

## 2025-06-20 DIAGNOSIS — Z87.440 PERSONAL HISTORY OF URINARY (TRACT) INFECTIONS: ICD-10-CM

## 2025-06-20 DIAGNOSIS — Z88.6 ALLERGY STATUS TO ANALGESIC AGENT: ICD-10-CM

## 2025-06-20 DIAGNOSIS — E11.9 TYPE 2 DIABETES MELLITUS WITHOUT COMPLICATIONS: ICD-10-CM

## 2025-06-20 DIAGNOSIS — K80.20 CALCULUS OF GALLBLADDER WITHOUT CHOLECYSTITIS WITHOUT OBSTRUCTION: ICD-10-CM

## 2025-06-20 DIAGNOSIS — J90 PLEURAL EFFUSION, NOT ELSEWHERE CLASSIFIED: ICD-10-CM

## 2025-06-20 DIAGNOSIS — E83.52 HYPERCALCEMIA: ICD-10-CM

## 2025-06-20 DIAGNOSIS — E78.5 HYPERLIPIDEMIA, UNSPECIFIED: ICD-10-CM

## 2025-06-20 DIAGNOSIS — Z74.01 BED CONFINEMENT STATUS: ICD-10-CM

## 2025-06-20 DIAGNOSIS — M10.9 GOUT, UNSPECIFIED: ICD-10-CM

## 2025-06-20 DIAGNOSIS — Z79.84 LONG TERM (CURRENT) USE OF ORAL HYPOGLYCEMIC DRUGS: ICD-10-CM

## 2025-06-20 DIAGNOSIS — Z79.01 LONG TERM (CURRENT) USE OF ANTICOAGULANTS: ICD-10-CM

## 2025-06-20 DIAGNOSIS — K52.9 NONINFECTIVE GASTROENTERITIS AND COLITIS, UNSPECIFIED: ICD-10-CM

## 2025-06-20 DIAGNOSIS — R53.1 WEAKNESS: ICD-10-CM

## 2025-06-20 DIAGNOSIS — A04.4 OTHER INTESTINAL ESCHERICHIA COLI INFECTIONS: ICD-10-CM

## 2025-06-20 DIAGNOSIS — J98.11 ATELECTASIS: ICD-10-CM

## 2025-06-20 DIAGNOSIS — K40.90 UNILATERAL INGUINAL HERNIA, WITHOUT OBSTRUCTION OR GANGRENE, NOT SPECIFIED AS RECURRENT: ICD-10-CM

## 2025-06-20 DIAGNOSIS — T88.7XXA UNSPECIFIED ADVERSE EFFECT OF DRUG OR MEDICAMENT, INITIAL ENCOUNTER: ICD-10-CM

## 2025-06-20 DIAGNOSIS — N20.0 CALCULUS OF KIDNEY: ICD-10-CM

## 2025-06-20 DIAGNOSIS — I48.20 CHRONIC ATRIAL FIBRILLATION, UNSPECIFIED: ICD-10-CM
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
## 2025-06-20 DIAGNOSIS — N28.1 CYST OF KIDNEY, ACQUIRED: ICD-10-CM

## 2025-07-17 ENCOUNTER — RESULT REVIEW (OUTPATIENT)
Age: 75
End: 2025-07-17